# Patient Record
Sex: MALE | Race: WHITE | Employment: OTHER | URBAN - METROPOLITAN AREA
[De-identification: names, ages, dates, MRNs, and addresses within clinical notes are randomized per-mention and may not be internally consistent; named-entity substitution may affect disease eponyms.]

---

## 2021-03-30 ENCOUNTER — OFFICE VISIT (OUTPATIENT)
Dept: INTERNAL MEDICINE CLINIC | Facility: CLINIC | Age: 86
End: 2021-03-30
Payer: COMMERCIAL

## 2021-03-30 ENCOUNTER — LAB ENCOUNTER (OUTPATIENT)
Dept: LAB | Age: 86
End: 2021-03-30
Attending: INTERNAL MEDICINE
Payer: MEDICARE

## 2021-03-30 VITALS
HEIGHT: 66 IN | BODY MASS INDEX: 22.02 KG/M2 | TEMPERATURE: 98 F | WEIGHT: 137 LBS | OXYGEN SATURATION: 99 % | HEART RATE: 88 BPM | RESPIRATION RATE: 16 BRPM | SYSTOLIC BLOOD PRESSURE: 166 MMHG | DIASTOLIC BLOOD PRESSURE: 86 MMHG

## 2021-03-30 DIAGNOSIS — Z00.00 PHYSICAL EXAM: ICD-10-CM

## 2021-03-30 DIAGNOSIS — E78.5 HYPERLIPIDEMIA, UNSPECIFIED HYPERLIPIDEMIA TYPE: ICD-10-CM

## 2021-03-30 DIAGNOSIS — I10 ESSENTIAL HYPERTENSION: ICD-10-CM

## 2021-03-30 DIAGNOSIS — Z00.00 PHYSICAL EXAM: Primary | ICD-10-CM

## 2021-03-30 DIAGNOSIS — M19.90 ARTHRITIS: ICD-10-CM

## 2021-03-30 PROCEDURE — 99203 OFFICE O/P NEW LOW 30 MIN: CPT | Performed by: INTERNAL MEDICINE

## 2021-03-30 PROCEDURE — 3077F SYST BP >= 140 MM HG: CPT | Performed by: INTERNAL MEDICINE

## 2021-03-30 PROCEDURE — 3079F DIAST BP 80-89 MM HG: CPT | Performed by: INTERNAL MEDICINE

## 2021-03-30 PROCEDURE — 3008F BODY MASS INDEX DOCD: CPT | Performed by: INTERNAL MEDICINE

## 2021-03-30 PROCEDURE — 80053 COMPREHEN METABOLIC PANEL: CPT

## 2021-03-30 PROCEDURE — 80061 LIPID PANEL: CPT

## 2021-03-30 PROCEDURE — 85025 COMPLETE CBC W/AUTO DIFF WBC: CPT

## 2021-03-30 PROCEDURE — G0439 PPPS, SUBSEQ VISIT: HCPCS | Performed by: INTERNAL MEDICINE

## 2021-03-30 PROCEDURE — 83036 HEMOGLOBIN GLYCOSYLATED A1C: CPT

## 2021-03-30 PROCEDURE — 36415 COLL VENOUS BLD VENIPUNCTURE: CPT

## 2021-03-30 PROCEDURE — 84443 ASSAY THYROID STIM HORMONE: CPT

## 2021-03-30 RX ORDER — LISINOPRIL 30 MG/1
30 TABLET ORAL DAILY
COMMUNITY
Start: 2021-01-27 | End: 2021-03-30

## 2021-03-30 RX ORDER — EZETIMIBE 10 MG/1
10 TABLET ORAL DAILY
COMMUNITY
Start: 2021-01-12 | End: 2021-04-13

## 2021-03-30 RX ORDER — LISINOPRIL 40 MG/1
40 TABLET ORAL DAILY
Qty: 90 TABLET | Refills: 3 | Status: SHIPPED | OUTPATIENT
Start: 2021-03-30

## 2021-03-30 RX ORDER — ASPIRIN 81 MG/1
TABLET, CHEWABLE ORAL DAILY
COMMUNITY

## 2021-03-30 NOTE — PROGRESS NOTES
Tu Watson is a 80year old male who presents for a Medicare Annual Wellness visit. Presents to office for the first time to establish care.     Did not take BP meds this morning--thought he was not supposed to take this because he was fasting for bl Fall/Risk Scorin            Depression Screening (PHQ-2/PHQ-9): Over the LAST 2 WEEKS   Little interest or pleasure in doing things: Not at all    Feeling down, depressed, or hopeless: Not at all    PHQ-2 SCORE: 0        A found for this or any previous visit. Update Immunization Activity if applicable    Hepatitis B No orders found for this or any previous visit. Update Immunization Activity if applicable    Tetanus No orders found for this or any previous visit.  Update Imm TONSILLECTOMY        Family History   Problem Relation Age of Onset   • Heart Disorder Maternal Grandmother    • Diabetes Mother       SOCIAL HISTORY:   Social History    Tobacco Use      Smoking status: Former Smoker        Years: 40.00        Quit date: NECK: supple, no adenopathy, no bruits, no thyromegaly  CHEST: no chest tenderness  LUNGS: clear to auscultation without any wheezing or rhonchi  CARDIO: RRR without murmur, S1, S2  GI: good BS's, no masses, HSM or tenderness  : two descended testicles exercises. Patient thinks he has had pneumonia vaccines but will check for sure. He was reminded to get COVID-19 vaccine when available to him. The patient indicates understanding of these issues and agrees to the plan.   The patient is asked to retu

## 2021-04-01 ENCOUNTER — TELEPHONE (OUTPATIENT)
Dept: INTERNAL MEDICINE CLINIC | Facility: CLINIC | Age: 86
End: 2021-04-01

## 2021-04-01 NOTE — TELEPHONE ENCOUNTER
Yojana Gale MD  P [facility] Clinical Staff  Hemoglobin A1c is 6.1--this puts patient in prediabetic range. No medication is needed.    However patient needs to try to be more strict with good dietary habits and regular exercise in order to prevent p

## 2021-04-01 NOTE — TELEPHONE ENCOUNTER
Spoke with patient and relayed Dr. Mackenzie Farrell messages. Patient verbalized an understanding to all information.

## 2021-04-01 NOTE — TELEPHONE ENCOUNTER
----- Message from Willi Disal MD sent at 3/31/2021  7:37 PM CDT -----  Hemoglobin A1c is 6.1--this puts patient in prediabetic range. No medication is needed.   However patient needs to try to be more strict with good dietary habits and regular exerc

## 2021-04-13 ENCOUNTER — TELEPHONE (OUTPATIENT)
Dept: INTERNAL MEDICINE CLINIC | Facility: CLINIC | Age: 86
End: 2021-04-13

## 2021-04-14 RX ORDER — EZETIMIBE 10 MG/1
10 TABLET ORAL DAILY
Qty: 90 TABLET | Refills: 3 | Status: SHIPPED | OUTPATIENT
Start: 2021-04-14

## 2021-08-02 ENCOUNTER — OFFICE VISIT (OUTPATIENT)
Dept: INTERNAL MEDICINE CLINIC | Facility: CLINIC | Age: 86
End: 2021-08-02
Payer: COMMERCIAL

## 2021-08-02 VITALS
DIASTOLIC BLOOD PRESSURE: 68 MMHG | HEART RATE: 79 BPM | WEIGHT: 137.19 LBS | HEIGHT: 66 IN | OXYGEN SATURATION: 100 % | SYSTOLIC BLOOD PRESSURE: 144 MMHG | BODY MASS INDEX: 22.05 KG/M2

## 2021-08-02 DIAGNOSIS — I10 ESSENTIAL HYPERTENSION: Primary | ICD-10-CM

## 2021-08-02 DIAGNOSIS — R73.03 PREDIABETES: ICD-10-CM

## 2021-08-02 DIAGNOSIS — E78.5 HYPERLIPIDEMIA, UNSPECIFIED HYPERLIPIDEMIA TYPE: ICD-10-CM

## 2021-08-02 PROCEDURE — 90471 IMMUNIZATION ADMIN: CPT | Performed by: INTERNAL MEDICINE

## 2021-08-02 PROCEDURE — 3077F SYST BP >= 140 MM HG: CPT | Performed by: INTERNAL MEDICINE

## 2021-08-02 PROCEDURE — 3078F DIAST BP <80 MM HG: CPT | Performed by: INTERNAL MEDICINE

## 2021-08-02 PROCEDURE — 90732 PPSV23 VACC 2 YRS+ SUBQ/IM: CPT | Performed by: INTERNAL MEDICINE

## 2021-08-02 PROCEDURE — 99213 OFFICE O/P EST LOW 20 MIN: CPT | Performed by: INTERNAL MEDICINE

## 2021-08-02 PROCEDURE — 3008F BODY MASS INDEX DOCD: CPT | Performed by: INTERNAL MEDICINE

## 2021-08-02 NOTE — PROGRESS NOTES
HPI:    Patient ID: Karen Welch is a 80year old male. Presents for f/u. Has been feeling well and has no complaints. Has been monitoring blood pressure at home with systolic readings in the 031R and diastolic readings in the 65R.     Denies anny Essential hypertension  (primary encounter diagnosis)  Hyperlipidemia, unspecified hyperlipidemia type  Prediabetes    Blood pressure remains under reasonable control with lisinopril--continue the same.     Last lipid profile done 4 months ago showed LDL

## 2021-11-02 ENCOUNTER — IMMUNIZATION (OUTPATIENT)
Dept: INTERNAL MEDICINE CLINIC | Facility: CLINIC | Age: 86
End: 2021-11-02
Payer: COMMERCIAL

## 2021-11-02 DIAGNOSIS — Z23 NEED FOR VACCINATION: Primary | ICD-10-CM

## 2021-11-02 PROCEDURE — 90662 IIV NO PRSV INCREASED AG IM: CPT | Performed by: INTERNAL MEDICINE

## 2021-11-02 PROCEDURE — 90471 IMMUNIZATION ADMIN: CPT | Performed by: INTERNAL MEDICINE

## 2022-01-17 ENCOUNTER — APPOINTMENT (OUTPATIENT)
Dept: CT IMAGING | Facility: HOSPITAL | Age: 87
End: 2022-01-17
Attending: EMERGENCY MEDICINE
Payer: MEDICARE

## 2022-01-17 ENCOUNTER — HOSPITAL ENCOUNTER (EMERGENCY)
Facility: HOSPITAL | Age: 87
Discharge: HOME | End: 2022-01-17
Attending: EMERGENCY MEDICINE
Payer: MEDICARE

## 2022-01-17 VITALS
DIASTOLIC BLOOD PRESSURE: 77 MMHG | WEIGHT: 135 LBS | HEART RATE: 87 BPM | SYSTOLIC BLOOD PRESSURE: 150 MMHG | RESPIRATION RATE: 16 BRPM | OXYGEN SATURATION: 96 % | BODY MASS INDEX: 22 KG/M2 | TEMPERATURE: 98 F

## 2022-01-17 DIAGNOSIS — S01.01XA LACERATION OF SCALP, INITIAL ENCOUNTER: Primary | ICD-10-CM

## 2022-01-17 DIAGNOSIS — S09.90XA INJURY OF HEAD, INITIAL ENCOUNTER: ICD-10-CM

## 2022-01-17 PROCEDURE — 99284 EMERGENCY DEPT VISIT MOD MDM: CPT

## 2022-01-17 PROCEDURE — 12001 RPR S/N/AX/GEN/TRNK 2.5CM/<: CPT

## 2022-01-17 PROCEDURE — 70450 CT HEAD/BRAIN W/O DYE: CPT | Performed by: EMERGENCY MEDICINE

## 2022-01-17 PROCEDURE — 90471 IMMUNIZATION ADMIN: CPT

## 2022-01-17 RX ORDER — LIDOCAINE HYDROCHLORIDE AND EPINEPHRINE 20; 5 MG/ML; UG/ML
20 INJECTION, SOLUTION EPIDURAL; INFILTRATION; INTRACAUDAL; PERINEURAL ONCE
Status: COMPLETED | OUTPATIENT
Start: 2022-01-17 | End: 2022-01-17

## 2022-01-18 NOTE — ED QUICK NOTES
This RN spoke with patient's nephew Ankit Fitzpatrick [phone], updated on plan of care with patient's approval. Okay to call Ankit Fitzpatrick for a ride home.

## 2022-01-18 NOTE — ED PROVIDER NOTES
Patient Seen in: [facility] AND [facility] Emergency Department      History   Patient presents with:  Fall    Stated Complaint: Fall with Head Lac    Subjective:   HPI    80year old male presents for evaluation for a fall.   Patient was at home when he tripped reviewed. Constitutional:       Appearance: Normal appearance. He is well-developed. HENT:      Head: Normocephalic. Laceration present.         Right Ear: External ear normal.      Left Ear: External ear normal.      Nose: Nose normal. No nasal deformi pain, normal range of motion and neck supple. No erythema or rigidity. No spinous process tenderness or muscular tenderness. Normal range of motion.       Thoracic back: Normal.      Lumbar back: Normal.      Right hip: Normal.      Left hip: Normal.      R encouraged. Imaging:   CT BRAIN OR HEAD (98566)    Result Date: 1/17/2022  PROCEDURE: CT BRAIN OR HEAD (CPT=70450)  COMPARISON: None. INDICATIONS: Fall with head posterior laceration.   TECHNIQUE: CT images were obtained without contrast material.  Aut Taniya Joe MD on 1/17/2022 at 9:19 PM            I personally reviewed all radiology images.     Medical Record Review: I personally reviewed available prior medical records for any recent pertinent discharge summaries, testing, and procedures, and

## 2022-01-18 NOTE — ED INITIAL ASSESSMENT (HPI)
patient states he fell and hit head head on a glass table 30 mins PTA. Lac/ hematomta to the back of his head. States he was going to plug in his hand massager and he lost his balance and fell backwards. Denies any dizziness before he fell. Denies LOC.  He

## 2022-01-27 ENCOUNTER — OFFICE VISIT (OUTPATIENT)
Dept: INTERNAL MEDICINE CLINIC | Facility: CLINIC | Age: 87
End: 2022-01-27
Payer: MEDICARE

## 2022-01-27 VITALS
BODY MASS INDEX: 22.05 KG/M2 | TEMPERATURE: 98 F | OXYGEN SATURATION: 99 % | DIASTOLIC BLOOD PRESSURE: 86 MMHG | SYSTOLIC BLOOD PRESSURE: 150 MMHG | WEIGHT: 137.19 LBS | HEIGHT: 66 IN | HEART RATE: 76 BPM

## 2022-01-27 DIAGNOSIS — S01.01XD LACERATION OF SCALP, SUBSEQUENT ENCOUNTER: Primary | ICD-10-CM

## 2022-01-27 PROCEDURE — 99213 OFFICE O/P EST LOW 20 MIN: CPT | Performed by: INTERNAL MEDICINE

## 2022-01-27 PROCEDURE — 3008F BODY MASS INDEX DOCD: CPT | Performed by: INTERNAL MEDICINE

## 2022-01-27 PROCEDURE — 3077F SYST BP >= 140 MM HG: CPT | Performed by: INTERNAL MEDICINE

## 2022-01-27 PROCEDURE — 3079F DIAST BP 80-89 MM HG: CPT | Performed by: INTERNAL MEDICINE

## 2022-01-27 NOTE — PROGRESS NOTES
Luan Senior is a 80year old male. HPI:   Patient presents with:  ER F/U: 1/17/22 for laceration to scalp. Pt present for stitch removal to scalp. Denies pain.        79 y/o M who suffered posterior scalp laceration on 1/17/22 when he fell backwards a negative. PHYSICAL EXAM:   Blood pressure 150/86, pulse 76, temperature 97.5 °F (36.4 °C), height 5' 6\" (1.676 m), weight 137 lb 3.2 oz (62.2 kg), SpO2 99 %.   Constitutional: alert and oriented x3 in no acute distress  HEENT- EOMI, PERRL  Nose/Mout

## 2022-02-08 ENCOUNTER — LAB ENCOUNTER (OUTPATIENT)
Dept: LAB | Age: 87
End: 2022-02-08
Attending: INTERNAL MEDICINE
Payer: MEDICARE

## 2022-02-08 ENCOUNTER — OFFICE VISIT (OUTPATIENT)
Dept: INTERNAL MEDICINE CLINIC | Facility: CLINIC | Age: 87
End: 2022-02-08
Payer: MEDICARE

## 2022-02-08 VITALS
WEIGHT: 136 LBS | HEIGHT: 66 IN | SYSTOLIC BLOOD PRESSURE: 142 MMHG | HEART RATE: 80 BPM | DIASTOLIC BLOOD PRESSURE: 82 MMHG | BODY MASS INDEX: 21.86 KG/M2 | TEMPERATURE: 98 F

## 2022-02-08 DIAGNOSIS — R73.03 PREDIABETES: ICD-10-CM

## 2022-02-08 DIAGNOSIS — N40.1 BENIGN PROSTATIC HYPERPLASIA WITH NOCTURIA: ICD-10-CM

## 2022-02-08 DIAGNOSIS — I10 ESSENTIAL HYPERTENSION: ICD-10-CM

## 2022-02-08 DIAGNOSIS — Z00.00 PHYSICAL EXAM: Primary | ICD-10-CM

## 2022-02-08 DIAGNOSIS — H91.93 BILATERAL HEARING LOSS, UNSPECIFIED HEARING LOSS TYPE: ICD-10-CM

## 2022-02-08 DIAGNOSIS — R35.1 BENIGN PROSTATIC HYPERPLASIA WITH NOCTURIA: ICD-10-CM

## 2022-02-08 DIAGNOSIS — E78.5 HYPERLIPIDEMIA, UNSPECIFIED HYPERLIPIDEMIA TYPE: ICD-10-CM

## 2022-02-08 DIAGNOSIS — Z00.00 PHYSICAL EXAM: ICD-10-CM

## 2022-02-08 LAB
ALBUMIN SERPL-MCNC: 4.2 G/DL (ref 3.4–5)
ALBUMIN/GLOB SERPL: 1.2 {RATIO} (ref 1–2)
ALP LIVER SERPL-CCNC: 76 U/L
ALT SERPL-CCNC: 24 U/L
ANION GAP SERPL CALC-SCNC: 7 MMOL/L (ref 0–18)
AST SERPL-CCNC: 16 U/L (ref 15–37)
BASOPHILS # BLD AUTO: 0.06 X10(3) UL (ref 0–0.2)
BASOPHILS NFR BLD AUTO: 1 %
BILIRUB SERPL-MCNC: 0.5 MG/DL (ref 0.1–2)
BUN BLD-MCNC: 24 MG/DL (ref 7–18)
BUN/CREAT SERPL: 25.5 (ref 10–20)
CALCIUM BLD-MCNC: 9.8 MG/DL (ref 8.5–10.1)
CHLORIDE SERPL-SCNC: 106 MMOL/L (ref 98–112)
CHOLEST SERPL-MCNC: 157 MG/DL (ref ?–200)
CO2 SERPL-SCNC: 26 MMOL/L (ref 21–32)
CREAT BLD-MCNC: 0.94 MG/DL
DEPRECATED RDW RBC AUTO: 46.9 FL (ref 35.1–46.3)
EOSINOPHIL # BLD AUTO: 0.16 X10(3) UL (ref 0–0.7)
EOSINOPHIL NFR BLD AUTO: 2.6 %
ERYTHROCYTE [DISTWIDTH] IN BLOOD BY AUTOMATED COUNT: 13.2 % (ref 11–15)
EST. AVERAGE GLUCOSE BLD GHB EST-MCNC: 123 MG/DL (ref 68–126)
FASTING PATIENT LIPID ANSWER: YES
FASTING STATUS PATIENT QL REPORTED: YES
GLOBULIN PLAS-MCNC: 3.5 G/DL (ref 2.8–4.4)
GLUCOSE BLD-MCNC: 118 MG/DL (ref 70–99)
HBA1C MFR BLD: 5.9 % (ref ?–5.7)
HCT VFR BLD AUTO: 35.3 %
HDLC SERPL-MCNC: 65 MG/DL (ref 40–59)
HGB BLD-MCNC: 11.8 G/DL
IMM GRANULOCYTES # BLD AUTO: 0.01 X10(3) UL (ref 0–1)
IMM GRANULOCYTES NFR BLD: 0.2 %
LDLC SERPL CALC-MCNC: 78 MG/DL (ref ?–100)
LYMPHOCYTES # BLD AUTO: 1.29 X10(3) UL (ref 1–4)
LYMPHOCYTES NFR BLD AUTO: 20.6 %
MCH RBC QN AUTO: 32.3 PG (ref 26–34)
MCHC RBC AUTO-ENTMCNC: 33.4 G/DL (ref 31–37)
MCV RBC AUTO: 96.7 FL
MONOCYTES # BLD AUTO: 0.56 X10(3) UL (ref 0.1–1)
MONOCYTES NFR BLD AUTO: 8.9 %
NEUTROPHILS # BLD AUTO: 4.19 X10 (3) UL (ref 1.5–7.7)
NEUTROPHILS # BLD AUTO: 4.19 X10(3) UL (ref 1.5–7.7)
NEUTROPHILS NFR BLD AUTO: 66.7 %
NONHDLC SERPL-MCNC: 92 MG/DL (ref ?–130)
OSMOLALITY SERPL CALC.SUM OF ELEC: 293 MOSM/KG (ref 275–295)
PLATELET # BLD AUTO: 268 10(3)UL (ref 150–450)
POTASSIUM SERPL-SCNC: 5 MMOL/L (ref 3.5–5.1)
PROT SERPL-MCNC: 7.7 G/DL (ref 6.4–8.2)
RBC # BLD AUTO: 3.65 X10(6)UL
SODIUM SERPL-SCNC: 139 MMOL/L (ref 136–145)
TRIGL SERPL-MCNC: 75 MG/DL (ref 30–149)
TSI SER-ACNC: 2.13 MIU/ML (ref 0.36–3.74)
VLDLC SERPL CALC-MCNC: 12 MG/DL (ref 0–30)
WBC # BLD AUTO: 6.3 X10(3) UL (ref 4–11)

## 2022-02-08 PROCEDURE — 84443 ASSAY THYROID STIM HORMONE: CPT

## 2022-02-08 PROCEDURE — 99397 PER PM REEVAL EST PAT 65+ YR: CPT | Performed by: INTERNAL MEDICINE

## 2022-02-08 PROCEDURE — 3008F BODY MASS INDEX DOCD: CPT | Performed by: INTERNAL MEDICINE

## 2022-02-08 PROCEDURE — 36415 COLL VENOUS BLD VENIPUNCTURE: CPT

## 2022-02-08 PROCEDURE — 96160 PT-FOCUSED HLTH RISK ASSMT: CPT | Performed by: INTERNAL MEDICINE

## 2022-02-08 PROCEDURE — G0439 PPPS, SUBSEQ VISIT: HCPCS | Performed by: INTERNAL MEDICINE

## 2022-02-08 PROCEDURE — 85025 COMPLETE CBC W/AUTO DIFF WBC: CPT

## 2022-02-08 PROCEDURE — 80061 LIPID PANEL: CPT

## 2022-02-08 PROCEDURE — 3077F SYST BP >= 140 MM HG: CPT | Performed by: INTERNAL MEDICINE

## 2022-02-08 PROCEDURE — 83036 HEMOGLOBIN GLYCOSYLATED A1C: CPT

## 2022-02-08 PROCEDURE — 80053 COMPREHEN METABOLIC PANEL: CPT

## 2022-02-08 PROCEDURE — 3079F DIAST BP 80-89 MM HG: CPT | Performed by: INTERNAL MEDICINE

## 2022-02-11 ENCOUNTER — TELEPHONE (OUTPATIENT)
Dept: INTERNAL MEDICINE CLINIC | Facility: CLINIC | Age: 87
End: 2022-02-11

## 2022-02-11 NOTE — TELEPHONE ENCOUNTER
----- Message from Hortencia Sepulveda MD sent at 2/10/2022  5:42 PM CST -----  General chemistries including electrolytes, kidney function and liver function tests are all unremarkable. Cholesterol is 157 with LDL of 78--shows good control. Thyroid function test is normal.    Hemoglobin A1c is improved from last year and is now 5.9, where last year it was 6.1--continue to push healthy weight loss through good dietary habits and regular exercise in order to prevent progression to diabetes in the future. CBC is unremarkable. Continue same medications.

## 2022-02-14 RX ORDER — LISINOPRIL 40 MG/1
TABLET ORAL
Qty: 90 TABLET | Refills: 3 | Status: SHIPPED | OUTPATIENT
Start: 2022-02-14

## 2022-03-25 ENCOUNTER — TELEPHONE (OUTPATIENT)
Dept: INTERNAL MEDICINE CLINIC | Facility: CLINIC | Age: 87
End: 2022-03-25

## 2022-03-25 ENCOUNTER — OFFICE VISIT (OUTPATIENT)
Dept: INTERNAL MEDICINE CLINIC | Facility: CLINIC | Age: 87
End: 2022-03-25
Payer: MEDICARE

## 2022-03-25 VITALS
DIASTOLIC BLOOD PRESSURE: 68 MMHG | SYSTOLIC BLOOD PRESSURE: 130 MMHG | OXYGEN SATURATION: 98 % | WEIGHT: 137 LBS | BODY MASS INDEX: 22.02 KG/M2 | HEIGHT: 66 IN | TEMPERATURE: 98 F | HEART RATE: 97 BPM

## 2022-03-25 DIAGNOSIS — L23.7 POISON IVY DERMATITIS: Primary | ICD-10-CM

## 2022-03-25 PROCEDURE — 3075F SYST BP GE 130 - 139MM HG: CPT | Performed by: INTERNAL MEDICINE

## 2022-03-25 PROCEDURE — 99214 OFFICE O/P EST MOD 30 MIN: CPT | Performed by: INTERNAL MEDICINE

## 2022-03-25 PROCEDURE — 3078F DIAST BP <80 MM HG: CPT | Performed by: INTERNAL MEDICINE

## 2022-03-25 PROCEDURE — 3008F BODY MASS INDEX DOCD: CPT | Performed by: INTERNAL MEDICINE

## 2022-03-25 RX ORDER — PREDNISONE 20 MG/1
TABLET ORAL
Qty: 19 TABLET | Refills: 0 | Status: SHIPPED | OUTPATIENT
Start: 2022-03-25 | End: 2022-04-08

## 2022-03-25 RX ORDER — ASPIRIN 81 MG/1
81 TABLET ORAL DAILY
COMMUNITY

## 2022-03-25 RX ORDER — CETIRIZINE HYDROCHLORIDE 5 MG/1
5 TABLET ORAL DAILY
Qty: 30 TABLET | Refills: 0 | Status: SHIPPED | OUTPATIENT
Start: 2022-03-25

## 2022-03-25 NOTE — TELEPHONE ENCOUNTER
Spoke to pt who reports x2 weeks red, dry and flaky rash bilateral arms, shoulders, chest and back of neck. States rash is extremely itchy, but Benadryl spray did help with symptoms. Pt reports he was gardening right before rash started and was exposed to rick; unknown if poison ivy or not. Denies oral itching/swelling or airway constriction, hives/bumps, covid type symptoms, fever, change in medications, detergent or products. [place].      Appt scheduled with Dr. Eddie Khan at 21 [phone] as PCP schedule full

## 2022-03-25 NOTE — TELEPHONE ENCOUNTER
Patient has an itchy rash on both of his arms   He is scratching a lot   Pt's neighbor is a nurse, she sprayed benadryl on it and the itching has subsided  Requests call back from Dr Brown Walker nurse   to advise what else he can do for this   [phone]

## 2022-04-07 RX ORDER — EZETIMIBE 10 MG/1
TABLET ORAL
Qty: 90 TABLET | Refills: 3 | Status: SHIPPED | OUTPATIENT
Start: 2022-04-07

## 2022-04-18 ENCOUNTER — OFFICE VISIT (OUTPATIENT)
Dept: INTERNAL MEDICINE CLINIC | Facility: CLINIC | Age: 87
End: 2022-04-18
Payer: MEDICARE

## 2022-04-18 VITALS
WEIGHT: 135 LBS | BODY MASS INDEX: 21.69 KG/M2 | HEIGHT: 66 IN | SYSTOLIC BLOOD PRESSURE: 152 MMHG | TEMPERATURE: 97 F | DIASTOLIC BLOOD PRESSURE: 80 MMHG

## 2022-04-18 DIAGNOSIS — E78.5 HYPERLIPIDEMIA, UNSPECIFIED HYPERLIPIDEMIA TYPE: ICD-10-CM

## 2022-04-18 DIAGNOSIS — I10 ESSENTIAL HYPERTENSION: ICD-10-CM

## 2022-04-18 DIAGNOSIS — R21 RASH: Primary | ICD-10-CM

## 2022-04-18 PROCEDURE — 99213 OFFICE O/P EST LOW 20 MIN: CPT | Performed by: INTERNAL MEDICINE

## 2022-04-18 PROCEDURE — 3077F SYST BP >= 140 MM HG: CPT | Performed by: INTERNAL MEDICINE

## 2022-04-18 PROCEDURE — 3079F DIAST BP 80-89 MM HG: CPT | Performed by: INTERNAL MEDICINE

## 2022-04-18 PROCEDURE — 3008F BODY MASS INDEX DOCD: CPT | Performed by: INTERNAL MEDICINE

## 2022-04-18 RX ORDER — AMLODIPINE BESYLATE 10 MG/1
10 TABLET ORAL DAILY
Qty: 30 TABLET | Refills: 0 | Status: SHIPPED | OUTPATIENT
Start: 2022-04-18

## 2022-04-18 RX ORDER — PREDNISONE 20 MG/1
40 TABLET ORAL DAILY
Qty: 20 TABLET | Refills: 0 | Status: SHIPPED | OUTPATIENT
Start: 2022-04-18 | End: 2022-04-28

## 2022-04-25 ENCOUNTER — OFFICE VISIT (OUTPATIENT)
Dept: INTERNAL MEDICINE CLINIC | Facility: CLINIC | Age: 87
End: 2022-04-25
Payer: MEDICARE

## 2022-04-25 VITALS
BODY MASS INDEX: 22.44 KG/M2 | TEMPERATURE: 98 F | WEIGHT: 139.63 LBS | DIASTOLIC BLOOD PRESSURE: 70 MMHG | HEART RATE: 81 BPM | HEIGHT: 66 IN | SYSTOLIC BLOOD PRESSURE: 160 MMHG | OXYGEN SATURATION: 99 %

## 2022-04-25 DIAGNOSIS — I10 ESSENTIAL HYPERTENSION: ICD-10-CM

## 2022-04-25 DIAGNOSIS — Z88.8 ALLERGY TO LISINOPRIL: ICD-10-CM

## 2022-04-25 DIAGNOSIS — R21 RASH: Primary | ICD-10-CM

## 2022-04-25 RX ORDER — AMLODIPINE BESYLATE 10 MG/1
10 TABLET ORAL DAILY
Qty: 90 TABLET | Refills: 3 | Status: SHIPPED | OUTPATIENT
Start: 2022-04-25 | End: 2022-04-25

## 2022-05-25 ENCOUNTER — OFFICE VISIT (OUTPATIENT)
Dept: INTERNAL MEDICINE CLINIC | Facility: CLINIC | Age: 87
End: 2022-05-25
Payer: MEDICARE

## 2022-05-25 VITALS
WEIGHT: 132 LBS | HEIGHT: 66 IN | HEART RATE: 82 BPM | SYSTOLIC BLOOD PRESSURE: 134 MMHG | OXYGEN SATURATION: 98 % | DIASTOLIC BLOOD PRESSURE: 72 MMHG | TEMPERATURE: 98 F | BODY MASS INDEX: 21.21 KG/M2

## 2022-05-25 DIAGNOSIS — I10 ESSENTIAL HYPERTENSION: Primary | ICD-10-CM

## 2022-05-25 DIAGNOSIS — R21 RASH: ICD-10-CM

## 2022-05-25 PROCEDURE — 3008F BODY MASS INDEX DOCD: CPT | Performed by: INTERNAL MEDICINE

## 2022-05-25 PROCEDURE — 3078F DIAST BP <80 MM HG: CPT | Performed by: INTERNAL MEDICINE

## 2022-05-25 PROCEDURE — 3075F SYST BP GE 130 - 139MM HG: CPT | Performed by: INTERNAL MEDICINE

## 2022-05-25 PROCEDURE — 99213 OFFICE O/P EST LOW 20 MIN: CPT | Performed by: INTERNAL MEDICINE

## 2022-07-01 ENCOUNTER — OFFICE VISIT (OUTPATIENT)
Dept: DERMATOLOGY CLINIC | Facility: CLINIC | Age: 87
End: 2022-07-01
Payer: MEDICARE

## 2022-07-01 DIAGNOSIS — L82.1 SEBORRHEIC KERATOSES: ICD-10-CM

## 2022-07-01 DIAGNOSIS — L81.4 LENTIGO: ICD-10-CM

## 2022-07-01 DIAGNOSIS — L30.9 DERMATITIS: Primary | ICD-10-CM

## 2022-07-01 PROCEDURE — 99203 OFFICE O/P NEW LOW 30 MIN: CPT | Performed by: DERMATOLOGY

## 2022-07-01 PROCEDURE — 1126F AMNT PAIN NOTED NONE PRSNT: CPT | Performed by: DERMATOLOGY

## 2022-07-01 RX ORDER — CLOBETASOL PROPIONATE 0.5 MG/G
CREAM TOPICAL
Qty: 45 G | Refills: 0 | Status: SHIPPED | OUTPATIENT
Start: 2022-07-01

## 2023-02-06 ENCOUNTER — OFFICE VISIT (OUTPATIENT)
Dept: INTERNAL MEDICINE CLINIC | Facility: CLINIC | Age: 88
End: 2023-02-06

## 2023-02-06 ENCOUNTER — LAB ENCOUNTER (OUTPATIENT)
Dept: LAB | Age: 88
End: 2023-02-06
Attending: INTERNAL MEDICINE
Payer: MEDICARE

## 2023-02-06 VITALS
TEMPERATURE: 98 F | WEIGHT: 129 LBS | BODY MASS INDEX: 20.73 KG/M2 | HEART RATE: 90 BPM | HEIGHT: 66 IN | OXYGEN SATURATION: 98 % | DIASTOLIC BLOOD PRESSURE: 64 MMHG | SYSTOLIC BLOOD PRESSURE: 128 MMHG

## 2023-02-06 DIAGNOSIS — Z00.00 PHYSICAL EXAM: ICD-10-CM

## 2023-02-06 DIAGNOSIS — E78.5 HYPERLIPIDEMIA, UNSPECIFIED HYPERLIPIDEMIA TYPE: ICD-10-CM

## 2023-02-06 DIAGNOSIS — M79.604 LEG PAIN, ANTERIOR, RIGHT: ICD-10-CM

## 2023-02-06 DIAGNOSIS — R73.03 PREDIABETES: ICD-10-CM

## 2023-02-06 DIAGNOSIS — I10 ESSENTIAL HYPERTENSION: ICD-10-CM

## 2023-02-06 DIAGNOSIS — R35.1 BENIGN PROSTATIC HYPERPLASIA WITH NOCTURIA: ICD-10-CM

## 2023-02-06 DIAGNOSIS — M19.90 ARTHRITIS: ICD-10-CM

## 2023-02-06 DIAGNOSIS — Z00.00 PHYSICAL EXAM: Primary | ICD-10-CM

## 2023-02-06 DIAGNOSIS — H91.93 BILATERAL HEARING LOSS, UNSPECIFIED HEARING LOSS TYPE: ICD-10-CM

## 2023-02-06 DIAGNOSIS — N40.1 BENIGN PROSTATIC HYPERPLASIA WITH NOCTURIA: ICD-10-CM

## 2023-02-06 LAB
ALBUMIN SERPL-MCNC: 3.9 G/DL (ref 3.4–5)
ALBUMIN/GLOB SERPL: 1 {RATIO} (ref 1–2)
ALP LIVER SERPL-CCNC: 103 U/L
ALT SERPL-CCNC: 26 U/L
ANION GAP SERPL CALC-SCNC: 4 MMOL/L (ref 0–18)
AST SERPL-CCNC: 18 U/L (ref 15–37)
BASOPHILS # BLD AUTO: 0.04 X10(3) UL (ref 0–0.2)
BASOPHILS NFR BLD AUTO: 0.6 %
BILIRUB SERPL-MCNC: 0.5 MG/DL (ref 0.1–2)
BUN BLD-MCNC: 31 MG/DL (ref 7–18)
BUN/CREAT SERPL: 30.4 (ref 10–20)
CALCIUM BLD-MCNC: 9.6 MG/DL (ref 8.5–10.1)
CHLORIDE SERPL-SCNC: 108 MMOL/L (ref 98–112)
CHOLEST SERPL-MCNC: 148 MG/DL (ref ?–200)
CO2 SERPL-SCNC: 27 MMOL/L (ref 21–32)
CREAT BLD-MCNC: 1.02 MG/DL
DEPRECATED RDW RBC AUTO: 46.6 FL (ref 35.1–46.3)
EOSINOPHIL # BLD AUTO: 0.2 X10(3) UL (ref 0–0.7)
EOSINOPHIL NFR BLD AUTO: 2.9 %
ERYTHROCYTE [DISTWIDTH] IN BLOOD BY AUTOMATED COUNT: 13 % (ref 11–15)
EST. AVERAGE GLUCOSE BLD GHB EST-MCNC: 140 MG/DL (ref 68–126)
FASTING PATIENT LIPID ANSWER: YES
FASTING STATUS PATIENT QL REPORTED: YES
GFR SERPLBLD BASED ON 1.73 SQ M-ARVRAT: 70 ML/MIN/1.73M2 (ref 60–?)
GLOBULIN PLAS-MCNC: 3.9 G/DL (ref 2.8–4.4)
GLUCOSE BLD-MCNC: 137 MG/DL (ref 70–99)
HBA1C MFR BLD: 6.5 % (ref ?–5.7)
HCT VFR BLD AUTO: 36.1 %
HDLC SERPL-MCNC: 71 MG/DL (ref 40–59)
HGB BLD-MCNC: 11.9 G/DL
IMM GRANULOCYTES # BLD AUTO: 0.01 X10(3) UL (ref 0–1)
IMM GRANULOCYTES NFR BLD: 0.1 %
LDLC SERPL CALC-MCNC: 60 MG/DL (ref ?–100)
LYMPHOCYTES # BLD AUTO: 1.3 X10(3) UL (ref 1–4)
LYMPHOCYTES NFR BLD AUTO: 19.1 %
MCH RBC QN AUTO: 31.9 PG (ref 26–34)
MCHC RBC AUTO-ENTMCNC: 33 G/DL (ref 31–37)
MCV RBC AUTO: 96.8 FL
MONOCYTES # BLD AUTO: 0.53 X10(3) UL (ref 0.1–1)
MONOCYTES NFR BLD AUTO: 7.8 %
NEUTROPHILS # BLD AUTO: 4.73 X10 (3) UL (ref 1.5–7.7)
NEUTROPHILS # BLD AUTO: 4.73 X10(3) UL (ref 1.5–7.7)
NEUTROPHILS NFR BLD AUTO: 69.5 %
NONHDLC SERPL-MCNC: 77 MG/DL (ref ?–130)
OSMOLALITY SERPL CALC.SUM OF ELEC: 297 MOSM/KG (ref 275–295)
PLATELET # BLD AUTO: 279 10(3)UL (ref 150–450)
POTASSIUM SERPL-SCNC: 4.2 MMOL/L (ref 3.5–5.1)
PROT SERPL-MCNC: 7.8 G/DL (ref 6.4–8.2)
RBC # BLD AUTO: 3.73 X10(6)UL
SODIUM SERPL-SCNC: 139 MMOL/L (ref 136–145)
TRIGL SERPL-MCNC: 89 MG/DL (ref 30–149)
TSI SER-ACNC: 1.79 MIU/ML (ref 0.36–3.74)
VLDLC SERPL CALC-MCNC: 13 MG/DL (ref 0–30)
WBC # BLD AUTO: 6.8 X10(3) UL (ref 4–11)

## 2023-02-06 PROCEDURE — 80061 LIPID PANEL: CPT

## 2023-02-06 PROCEDURE — 85025 COMPLETE CBC W/AUTO DIFF WBC: CPT

## 2023-02-06 PROCEDURE — 80053 COMPREHEN METABOLIC PANEL: CPT

## 2023-02-06 PROCEDURE — 83036 HEMOGLOBIN GLYCOSYLATED A1C: CPT

## 2023-02-06 PROCEDURE — 36415 COLL VENOUS BLD VENIPUNCTURE: CPT

## 2023-02-06 PROCEDURE — 84443 ASSAY THYROID STIM HORMONE: CPT

## 2023-02-06 RX ORDER — NAPROXEN SODIUM 220 MG
1 TABLET ORAL 2 TIMES DAILY
COMMUNITY

## 2023-02-07 ENCOUNTER — TELEPHONE (OUTPATIENT)
Dept: INTERNAL MEDICINE CLINIC | Facility: CLINIC | Age: 88
End: 2023-02-07

## 2023-02-07 DIAGNOSIS — E11.9 NEW ONSET TYPE 2 DIABETES MELLITUS (HCC): Primary | ICD-10-CM

## 2023-02-07 NOTE — TELEPHONE ENCOUNTER
Patient called and relayed Dr Lauren Echavarria message. Patient verbalized understanding with no further questions noted. Patient does not want to see dietician as he knows he has been eating more sweets lately.

## 2023-02-07 NOTE — TELEPHONE ENCOUNTER
----- Message from Clarissa Watson MD sent at 2/7/2023  9:32 AM CST -----  Hemoglobin A1c has increased to 6.5--this now classifies patient as diabetic. However we do not need to start medications unless this number gets greater than 7. Patient needs to be more strict with good dietary habits. He does eat out at restaurant nightly--needs to make better choices. Can also have patient evaluated by dietitian to help with dietary measures  Referral has been placed. CBC shows stable hemoglobin and normal white cell count. Cholesterol is good at 148 with LDL of 60    Electrolytes, kidney function and liver function test appear normal.    Thyroid function test is normal.    Continue same other medications.

## 2023-04-05 RX ORDER — EZETIMIBE 10 MG/1
TABLET ORAL
Qty: 90 TABLET | Refills: 3 | Status: SHIPPED | OUTPATIENT
Start: 2023-04-05

## 2023-04-21 RX ORDER — AMLODIPINE BESYLATE 10 MG/1
TABLET ORAL
Qty: 90 TABLET | Refills: 3 | Status: SHIPPED | OUTPATIENT
Start: 2023-04-21

## 2023-12-04 ENCOUNTER — TELEPHONE (OUTPATIENT)
Dept: INTERNAL MEDICINE CLINIC | Facility: CLINIC | Age: 88
End: 2023-12-04

## 2024-02-16 ENCOUNTER — OFFICE VISIT (OUTPATIENT)
Dept: INTERNAL MEDICINE CLINIC | Facility: CLINIC | Age: 89
End: 2024-02-16

## 2024-02-16 VITALS
WEIGHT: 125 LBS | BODY MASS INDEX: 20.09 KG/M2 | TEMPERATURE: 98 F | HEIGHT: 66 IN | DIASTOLIC BLOOD PRESSURE: 66 MMHG | OXYGEN SATURATION: 98 % | SYSTOLIC BLOOD PRESSURE: 118 MMHG | RESPIRATION RATE: 18 BRPM | HEART RATE: 80 BPM

## 2024-02-16 DIAGNOSIS — Z00.00 MEDICARE ANNUAL WELLNESS VISIT, SUBSEQUENT: Primary | ICD-10-CM

## 2024-02-16 DIAGNOSIS — M79.641 HAND PAIN, RIGHT: ICD-10-CM

## 2024-02-16 DIAGNOSIS — N40.1 BENIGN PROSTATIC HYPERPLASIA WITH NOCTURIA: ICD-10-CM

## 2024-02-16 DIAGNOSIS — Z00.00 ENCOUNTER FOR ANNUAL HEALTH EXAMINATION: ICD-10-CM

## 2024-02-16 DIAGNOSIS — E78.5 HYPERLIPIDEMIA, UNSPECIFIED HYPERLIPIDEMIA TYPE: ICD-10-CM

## 2024-02-16 DIAGNOSIS — H61.21 EXCESSIVE EAR WAX, RIGHT: ICD-10-CM

## 2024-02-16 DIAGNOSIS — I10 ESSENTIAL HYPERTENSION: ICD-10-CM

## 2024-02-16 DIAGNOSIS — R35.1 BENIGN PROSTATIC HYPERPLASIA WITH NOCTURIA: ICD-10-CM

## 2024-02-16 DIAGNOSIS — E55.9 VITAMIN D DEFICIENCY: ICD-10-CM

## 2024-02-16 LAB
ALBUMIN SERPL-MCNC: 4.6 G/DL (ref 3.2–4.8)
ALBUMIN/GLOB SERPL: 1.2 {RATIO} (ref 1–2)
ALP LIVER SERPL-CCNC: 86 U/L
ALT SERPL-CCNC: 18 U/L
ANION GAP SERPL CALC-SCNC: 6 MMOL/L (ref 0–18)
AST SERPL-CCNC: 24 U/L (ref ?–34)
BASOPHILS # BLD AUTO: 0.06 X10(3) UL (ref 0–0.2)
BASOPHILS NFR BLD AUTO: 0.9 %
BILIRUB SERPL-MCNC: 0.5 MG/DL (ref 0.2–0.9)
BILIRUB UR QL: NEGATIVE
BUN BLD-MCNC: 19 MG/DL (ref 9–23)
BUN/CREAT SERPL: 19.8 (ref 10–20)
CALCIUM BLD-MCNC: 10.4 MG/DL (ref 8.7–10.4)
CHLORIDE SERPL-SCNC: 104 MMOL/L (ref 98–112)
CHOLEST SERPL-MCNC: 171 MG/DL (ref ?–200)
CO2 SERPL-SCNC: 28 MMOL/L (ref 21–32)
COLOR UR: YELLOW
CREAT BLD-MCNC: 0.96 MG/DL
CREAT UR-SCNC: 193.1 MG/DL
DEPRECATED RDW RBC AUTO: 44 FL (ref 35.1–46.3)
EGFRCR SERPLBLD CKD-EPI 2021: 75 ML/MIN/1.73M2 (ref 60–?)
EOSINOPHIL # BLD AUTO: 0.31 X10(3) UL (ref 0–0.7)
EOSINOPHIL NFR BLD AUTO: 4.6 %
ERYTHROCYTE [DISTWIDTH] IN BLOOD BY AUTOMATED COUNT: 12.7 % (ref 11–15)
FASTING PATIENT LIPID ANSWER: YES
FASTING STATUS PATIENT QL REPORTED: YES
GLOBULIN PLAS-MCNC: 3.9 G/DL (ref 2.8–4.4)
GLUCOSE BLD-MCNC: 132 MG/DL (ref 70–99)
GLUCOSE UR-MCNC: NORMAL MG/DL
HCT VFR BLD AUTO: 37 %
HDLC SERPL-MCNC: 64 MG/DL (ref 40–59)
HGB BLD-MCNC: 13.1 G/DL
HGB UR QL STRIP.AUTO: NEGATIVE
IMM GRANULOCYTES # BLD AUTO: 0.01 X10(3) UL (ref 0–1)
IMM GRANULOCYTES NFR BLD: 0.1 %
KETONES UR-MCNC: NEGATIVE MG/DL
LDLC SERPL CALC-MCNC: 89 MG/DL (ref ?–100)
LEUKOCYTE ESTERASE UR QL STRIP.AUTO: 500
LYMPHOCYTES # BLD AUTO: 1.62 X10(3) UL (ref 1–4)
LYMPHOCYTES NFR BLD AUTO: 24.1 %
MCH RBC QN AUTO: 33.2 PG (ref 26–34)
MCHC RBC AUTO-ENTMCNC: 35.4 G/DL (ref 31–37)
MCV RBC AUTO: 93.9 FL
MICROALBUMIN UR-MCNC: 13.1 MG/DL
MICROALBUMIN/CREAT 24H UR-RTO: 67.8 UG/MG (ref ?–30)
MONOCYTES # BLD AUTO: 0.59 X10(3) UL (ref 0.1–1)
MONOCYTES NFR BLD AUTO: 8.8 %
NEUTROPHILS # BLD AUTO: 4.12 X10 (3) UL (ref 1.5–7.7)
NEUTROPHILS # BLD AUTO: 4.12 X10(3) UL (ref 1.5–7.7)
NEUTROPHILS NFR BLD AUTO: 61.5 %
NONHDLC SERPL-MCNC: 107 MG/DL (ref ?–130)
OSMOLALITY SERPL CALC.SUM OF ELEC: 290 MOSM/KG (ref 275–295)
PH UR: 6 [PH] (ref 5–8)
PLATELET # BLD AUTO: 282 10(3)UL (ref 150–450)
POTASSIUM SERPL-SCNC: 5.1 MMOL/L (ref 3.5–5.1)
PROT SERPL-MCNC: 8.5 G/DL (ref 5.7–8.2)
PROT UR-MCNC: 50 MG/DL
RBC # BLD AUTO: 3.94 X10(6)UL
RBC #/AREA URNS AUTO: >10 /HPF
SODIUM SERPL-SCNC: 138 MMOL/L (ref 136–145)
SP GR UR STRIP: 1.02 (ref 1–1.03)
TRIGL SERPL-MCNC: 101 MG/DL (ref 30–149)
TSI SER-ACNC: 2.22 MIU/ML (ref 0.55–4.78)
UROBILINOGEN UR STRIP-ACNC: 2
VIT D+METAB SERPL-MCNC: 21.8 NG/ML (ref 30–100)
VLDLC SERPL CALC-MCNC: 16 MG/DL (ref 0–30)
WBC # BLD AUTO: 6.7 X10(3) UL (ref 4–11)
WBC #/AREA URNS AUTO: >50 /HPF

## 2024-02-16 PROCEDURE — G0439 PPPS, SUBSEQ VISIT: HCPCS | Performed by: INTERNAL MEDICINE

## 2024-02-16 PROCEDURE — 36415 COLL VENOUS BLD VENIPUNCTURE: CPT | Performed by: INTERNAL MEDICINE

## 2024-02-16 PROCEDURE — 96160 PT-FOCUSED HLTH RISK ASSMT: CPT | Performed by: INTERNAL MEDICINE

## 2024-02-16 NOTE — PROGRESS NOTES
Subjective:   Sarwat Townsend is a 89 year old male who presents for a Medicare Subsequent Annual Wellness visit (Pt already had Initial Annual Wellness) and .     Patient comes in first time to establish care and for Medicare annual physical here with his niece who is helping with his care he stopped driving recently patient overall doing okay except for having pain on the right hand is a chronic problem he takes Aleve he says twice a day he has been doing this for many years denies any GI symptoms patient also taking baby aspirin he is not sure why he says he was told to take this long time ago no history of heart disease or stents no history of stroke.  Patient has diabetes diet controlled seen eye doctor recently  Patient also complains that sometimes at night when he goes to bed he feels some burning in his face and scalp but has not bothered him with sleep he is able to fall asleep when he wakes up is okay    History/Other:   Fall Risk Assessment:   He has been screened for Falls and is low risk.      Cognitive Assessment:   He had a completely normal cognitive assessment - see flowsheet entries     Functional Ability/Status:   Sarwat Townsend has some abnormal functions as listed below:  He has Driving difficulties based on screening of functional status. He has Hearing problems based on screening of functional status.      Depression Screening (PHQ-2/PHQ-9): PHQ-2 SCORE: 0  , done 2/16/2024   If you checked off any problems, how difficult have these problems made it for you to do your work, take care of things at home, or get along with other people?: Not difficult at all    Last [place] Suicide Screening on 2/16/2024 was No Risk.          Advanced Directives:   He does NOT have a Living Will. [Do you have a living will?: No]  He does NOT have a Power of  for Health Care. [Do you have a healthcare power of ?: No]  Discussed Advance Care Planning with patient (and family/surrogate if present).  Standard forms made available to patient in After Visit Summary.      Patient Active Problem List   Diagnosis    Essential hypertension    Hyperlipidemia    Arthritis    Prediabetes     Allergies:  He is allergic to lisinopril.    Current Medications:  Outpatient Medications Marked as Taking for the 24 encounter (Office Visit) with Kamlesh Durant MD   Medication Sig    AMLODIPINE 10 MG Oral Tab TAKE 1 TABLET DAILY    EZETIMIBE 10 MG Oral Tab TAKE 1 TABLET DAILY    naproxen (ALEVE) 220 MG Oral Tab Take 1 tablet (220 mg total) by mouth 2 (two) times daily.    clobetasol 0.05 % External Cream Use bid to red itchy areas on chest arms neck    aspirin 81 MG Oral Tab EC Take 1 tablet (81 mg total) by mouth daily.    Multiple Vitamins-Minerals (EYE-VITES) Oral Tab Take by mouth 2 (two) times a day.      B Complex-C-Folic Acid Oral Tab Take by mouth.       Medical History:  He  has a past medical history of Arthritis, Essential hypertension, Hyperlipidemia, and Prediabetes.  Surgical History:  He  has a past surgical history that includes tonsillectomy; hernia surgery; and other.   Family History:  His family history includes Diabetes in his mother; Heart Disorder in his maternal grandmother.  Social History:  He  reports that he quit smoking about 44 years ago. His smoking use included cigarettes. He has never used smokeless tobacco. He reports current alcohol use. He reports that he does not use drugs.    Tobacco:  He smoked tobacco in the past but quit greater than 12 months ago.  Social History    Tobacco Use      Smoking status: Former        Years: 40        Types: Cigarettes        Quit date:         Years since quittin.1      Smokeless tobacco: Never       CAGE Alcohol Screen:   CAGE screening score of 0 on 2024, showing low risk of alcohol abuse.      Patient Care Team:  Kamlesh Durant MD as PCP - General (Internal Medicine)    Review of Systems   Constitutional: Negative.  Negative for fatigue  and fever.   HENT: Negative.  Negative for congestion.    Eyes: Negative.    Respiratory: Negative.  Negative for cough, shortness of breath and wheezing.    Cardiovascular: Negative.  Negative for chest pain, palpitations and leg swelling.   Gastrointestinal: Negative.    Endocrine: Negative for cold intolerance and heat intolerance.   Genitourinary: Negative.  Negative for dysuria, flank pain and hematuria.   Musculoskeletal: Negative.  Negative for arthralgias, back pain and myalgias.        Rt hand pain    Skin: Negative.    Neurological: Negative.  Negative for dizziness, tremors, syncope, weakness and headaches.   Psychiatric/Behavioral: Negative.  Negative for agitation, behavioral problems and suicidal ideas. The patient is not nervous/anxious.           Objective:   Physical Exam  Constitutional:       Appearance: He is well-developed.   HENT:      Head: Normocephalic and atraumatic.      Right Ear: External ear normal. There is impacted cerumen.      Left Ear: External ear normal.      Ears:      Comments: Rt ear impacted cerumen      Nose: Nose normal.   Eyes:      Conjunctiva/sclera: Conjunctivae normal.      Pupils: Pupils are equal, round, and reactive to light.   Cardiovascular:      Rate and Rhythm: Normal rate and regular rhythm.      Heart sounds: Normal heart sounds.   Pulmonary:      Effort: Pulmonary effort is normal.      Breath sounds: Normal breath sounds.   Abdominal:      General: Bowel sounds are normal.      Palpations: Abdomen is soft.   Genitourinary:     Penis: Normal.       Prostate: Normal.      Rectum: Normal.   Musculoskeletal:         General: Tenderness present. Normal range of motion.      Cervical back: Normal range of motion and neck supple.      Comments: Rt hand pain    Skin:     General: Skin is warm and dry.   Neurological:      Mental Status: He is alert and oriented to person, place, and time.      Deep Tendon Reflexes: Reflexes are normal and symmetric.          BP  118/66 (BP Location: Left arm, Patient Position: Sitting, Cuff Size: adult)   Pulse 80   Temp 97.9 °F (36.6 °C) (Rectal)   Resp 18   Ht 5' 6\" (1.676 m)   Wt 125 lb (56.7 kg)   SpO2 98%   BMI 20.18 kg/m²  Estimated body mass index is 20.18 kg/m² as calculated from the following:    Height as of this encounter: 5' 6\" (1.676 m).    Weight as of this encounter: 125 lb (56.7 kg).    Medicare Hearing Assessment:   Hearing Screening    Screening Method: Questionnaire  I have a problem hearing over the telephone: Sometimes I have trouble following the conversations when two or more people are talking at the same time: Sometimes   I have trouble understanding things on the TV: Sometimes I have to strain to understand conversations: Yes   I have to worry about missing the telephone ring or doorbell: No I have trouble hearing conversations in a noisy background such as a crowded room or restaurant: Yes   I get confused about where sounds come from: No I misunderstand some words in a sentence and need to ask people to repeat themselves: Yes   I especially have trouble understanding the speech of women and children: No I have trouble understanding the speaker in a large room such as at a meeting or place of [employer]: Sometimes   Many people I talk to seem to mumble (or don't speak clearly): Yes People get annoyed because I misunderstand what they say: No   I misunderstand what others are saying and make inappropriate responses: No I avoid social activities because I cannot hear well and fear I will reply improperly: No   Family members and friends have told me they think I may have hearing loss: Yes             Visual Acuity:   Right Eye Visual Acuity: Corrected Right Eye Chart Acuity: 20/40   Left Eye Visual Acuity: Corrected Left Eye Chart Acuity: 20/40   Both Eyes Visual Acuity: Corrected Both Eyes Chart Acuity: 20/40   Able To Tolerate Visual Acuity: Yes        Assessment & Plan:   Sarwat Townsend is a 89 year old male  who presents for a Medicare Assessment.     1. Medicare annual wellness visit, subsequent (Primary) exam as above we will order labs  -     CBC With Differential With Platelet; Future; Expected date: 02/16/2024  -     Comp Metabolic Panel (14); Future; Expected date: 02/16/2024  -     Lipid Panel; Future; Expected date: 02/16/2024  -     TSH W Reflex To Free T4; Future; Expected date: 02/16/2024  -     Urinalysis, Routine; Future; Expected date: 02/16/2024  -     Hemoglobin A1C; Future; Expected date: 02/16/2024  -     Microalb/Creat Ratio, Random Urine; Future; Expected date: 02/16/2024  -     CBC With Differential With Platelet  -     Comp Metabolic Panel (14)  -     Lipid Panel  -     TSH W Reflex To Free T4  -     Urinalysis, Routine  -     Hemoglobin A1C  -     Microalb/Creat Ratio, Random Urine  2. Hyperlipidemia, unspecified hyperlipidemia type will retest watch diet  -     CBC With Differential With Platelet; Future; Expected date: 02/16/2024  -     Comp Metabolic Panel (14); Future; Expected date: 02/16/2024  -     Lipid Panel; Future; Expected date: 02/16/2024  -     TSH W Reflex To Free T4; Future; Expected date: 02/16/2024  -     Urinalysis, Routine; Future; Expected date: 02/16/2024  -     Hemoglobin A1C; Future; Expected date: 02/16/2024  -     Microalb/Creat Ratio, Random Urine; Future; Expected date: 02/16/2024  -     CBC With Differential With Platelet  -     Comp Metabolic Panel (14)  -     Lipid Panel  -     TSH W Reflex To Free T4  -     Urinalysis, Routine  -     Hemoglobin A1C  -     Microalb/Creat Ratio, Random Urine  3. Essential hypertension well-controlled continue current treatment  -     CBC With Differential With Platelet; Future; Expected date: 02/16/2024  -     Comp Metabolic Panel (14); Future; Expected date: 02/16/2024  -     Lipid Panel; Future; Expected date: 02/16/2024  -     TSH W Reflex To Free T4; Future; Expected date: 02/16/2024  -     Urinalysis, Routine; Future; Expected  date: 02/16/2024  -     Hemoglobin A1C; Future; Expected date: 02/16/2024  -     Microalb/Creat Ratio, Random Urine; Future; Expected date: 02/16/2024  -     CBC With Differential With Platelet  -     Comp Metabolic Panel (14)  -     Lipid Panel  -     TSH W Reflex To Free T4  -     Urinalysis, Routine  -     Hemoglobin A1C  -     Microalb/Creat Ratio, Random Urine  4. Benign prostatic hyperplasia with nocturia stable do not drink water too late at night  -     CBC With Differential With Platelet; Future; Expected date: 02/16/2024  -     Comp Metabolic Panel (14); Future; Expected date: 02/16/2024  -     Lipid Panel; Future; Expected date: 02/16/2024  -     TSH W Reflex To Free T4; Future; Expected date: 02/16/2024  -     Urinalysis, Routine; Future; Expected date: 02/16/2024  -     Hemoglobin A1C; Future; Expected date: 02/16/2024  -     Microalb/Creat Ratio, Random Urine; Future; Expected date: 02/16/2024  -     CBC With Differential With Platelet  -     Comp Metabolic Panel (14)  -     Lipid Panel  -     TSH W Reflex To Free T4  -     Urinalysis, Routine  -     Hemoglobin A1C  -     Microalb/Creat Ratio, Random Urine  5. Hand pain, right-as needed medication for pain asked him to cut down on the Aleve to no more than few times a week take Tylenol as needed  -     CBC With Differential With Platelet; Future; Expected date: 02/16/2024  -     Comp Metabolic Panel (14); Future; Expected date: 02/16/2024  -     Lipid Panel; Future; Expected date: 02/16/2024  -     TSH W Reflex To Free T4; Future; Expected date: 02/16/2024  -     Urinalysis, Routine; Future; Expected date: 02/16/2024  -     Hemoglobin A1C; Future; Expected date: 02/16/2024  -     Microalb/Creat Ratio, Random Urine; Future; Expected date: 02/16/2024  -     CBC With Differential With Platelet  -     Comp Metabolic Panel (14)  -     Lipid Panel  -     TSH W Reflex To Free T4  -     Urinalysis, Routine  -     Hemoglobin A1C  -     Microalb/Creat Ratio,  Random Urine  6. Vitamin D deficiency we will retest  -     Vitamin D; Future; Expected date: 02/16/2024  -     CBC With Differential With Platelet; Future; Expected date: 02/16/2024  -     Comp Metabolic Panel (14); Future; Expected date: 02/16/2024  -     Lipid Panel; Future; Expected date: 02/16/2024  -     TSH W Reflex To Free T4; Future; Expected date: 02/16/2024  -     Urinalysis, Routine; Future; Expected date: 02/16/2024  -     Hemoglobin A1C; Future; Expected date: 02/16/2024  -     Microalb/Creat Ratio, Random Urine; Future; Expected date: 02/16/2024  -     Vitamin D  -     CBC With Differential With Platelet  -     Comp Metabolic Panel (14)  -     Lipid Panel  -     TSH W Reflex To Free T4  -     Urinalysis, Routine  -     Hemoglobin A1C  -     Microalb/Creat Ratio, Random Urine  7. Excessive ear wax, right use over-the-counter drops if not better can come by to get it flushed    The patient indicates understanding of these issues and agrees to the plan.  Reinforced healthy diet, lifestyle, and exercise.      2.      No follow-ups on file.     Kamlesh Durant MD, 2/16/2024     Supplementary Documentation:   General Health:  In the past six months, have you lost more than 10 pounds without trying?: 2 - No  Has your appetite been poor?: No  Type of Diet: Balanced  How does the patient maintain a good energy level?: Stretching  How would you describe your daily physical activity?: Light  How would you describe your current health state?: Fair  How do you maintain positive mental well-being?: Visiting Family;Social Interaction  On a scale of 0 to 10, with 0 being no pain and 10 being severe pain, what is your pain level?: 3 - (Mild)  In the past six months, have you experienced urine leakage?: 0-No  At any time do you feel concerned for the safety/well-being of yourself and/or your children, in your home or elsewhere?: No  Have you had any immunizations at another office such as Influenza, Hepatitis B, Tetanus,  or Pneumococcal?: No        Sarwat Townsend's SCREENING SCHEDULE   Tests on this list are recommended by your physician but may not be covered, or covered at this frequency, by your insurer.   Please check with your insurance carrier before scheduling to verify coverage.   PREVENTATIVE SERVICES FREQUENCY &  COVERAGE DETAILS LAST COMPLETION DATE   Diabetes Screening    Fasting Blood Sugar / Glucose    One screening every 12 months if never tested or if previously tested but not diagnosed with pre-diabetes   One screening every 6 months if diagnosed with pre-diabetes Lab Results   Component Value Date     (H) 02/06/2023        Cardiovascular Disease Screening    Lipid Panel  Cholesterol  Lipoprotein (HDL)  Triglycerides Covered every 5 years for all Medicare beneficiaries without apparent signs or symptoms of cardiovascular disease Lab Results   Component Value Date    CHOLEST 148 02/06/2023    HDL 71 (H) 02/06/2023    LDL 60 02/06/2023    TRIG 89 02/06/2023         Electrocardiogram (EKG)   Covered if needed at Welcome to Medicare, and non-screening if indicated for medical reasons -      Ultrasound Screening for Abdominal Aortic Aneurysm (AAA) Covered once in a lifetime for one of the following risk factors    Men who are 65-75 years old and have ever smoked    Anyone with a family history -     Colorectal Cancer Screening  Covered for ages 50-85; only need ONE of the following:    Colonoscopy   Covered every 10 years    Covered every 2 years if patient is at high risk or previous colonoscopy was abnormal -    No recommendations at this time    Flexible Sigmoidoscopy   Covered every 4 years -    Fecal Occult Blood Test Covered annually -   Prostate Cancer Screening    Prostate-Specific Antigen (PSA) Annually No results found for: \"PSA\"  There are no preventive care reminders to display for this patient.   Immunizations    Influenza Covered once per flu season  Please get every year -  Influenza Vaccine(1) due  on 10/01/2023    Pneumococcal Each vaccine ([id number] & [id number]) covered once after 65 Prevnar 13: -    [id number]: 08/02/2021     No recommendations at this time    Hepatitis B One screening covered for patients with certain risk factors   -  No recommendations at this time    Tetanus Toxoid Not covered by Medicare Part B unless medically necessary (cut with metal); may be covered with your pharmacy prescription benefits -    Tetanus, Diptheria and Pertusis TD and TDaP Not covered by Medicare Part B -  No recommendations at this time    Zoster Not covered by Medicare Part B; may be covered with your pharmacy  prescription benefits -  Zoster Vaccines(1 of 2) Never done     Diabetes      Hemoglobin A1C Annually; if last result is elevated, may be asked to retest more frequently.    Medicare covers every 3 months Lab Results   Component Value Date     (H) 02/06/2023    A1C 6.5 (H) 02/06/2023       Hemoglobin A1C Test due on 08/06/2023    Creat/alb ratio Annually No results found for: \"MICROALBCREA\", \"MALBCRECALC\"    LDL Annually Lab Results   Component Value Date    LDL 60 02/06/2023       Dilated Eye Exam Annually Last Diabetic Eye Exam:  No data recorded  No data recorded

## 2024-02-17 LAB
EST. AVERAGE GLUCOSE BLD GHB EST-MCNC: 146 MG/DL (ref 68–126)
HBA1C MFR BLD: 6.7 % (ref ?–5.7)

## 2024-02-19 DIAGNOSIS — E55.9 VITAMIN D DEFICIENCY: Primary | ICD-10-CM

## 2024-02-29 ENCOUNTER — LAB ENCOUNTER (OUTPATIENT)
Dept: LAB | Age: 89
End: 2024-02-29
Attending: INTERNAL MEDICINE
Payer: MEDICARE

## 2024-02-29 PROCEDURE — 81001 URINALYSIS AUTO W/SCOPE: CPT | Performed by: INTERNAL MEDICINE

## 2024-03-07 ENCOUNTER — OFFICE VISIT (OUTPATIENT)
Dept: INTERNAL MEDICINE CLINIC | Facility: CLINIC | Age: 89
End: 2024-03-07

## 2024-03-07 VITALS
WEIGHT: 126 LBS | HEIGHT: 66 IN | RESPIRATION RATE: 17 BRPM | HEART RATE: 88 BPM | BODY MASS INDEX: 20.25 KG/M2 | OXYGEN SATURATION: 99 % | DIASTOLIC BLOOD PRESSURE: 62 MMHG | TEMPERATURE: 98 F | SYSTOLIC BLOOD PRESSURE: 136 MMHG

## 2024-03-07 DIAGNOSIS — H61.21 IMPACTED CERUMEN OF RIGHT EAR: Primary | ICD-10-CM

## 2024-03-07 PROCEDURE — 99213 OFFICE O/P EST LOW 20 MIN: CPT | Performed by: INTERNAL MEDICINE

## 2024-03-07 NOTE — PROGRESS NOTES
Subjective:   Patient ID: Sarwat Townsend is a 89 year old male.    HPI    Patient comes in today to have the wax removed from the right ear he tried over-the-counter drops but they did not help right ear feels muffled  History/Other:   Review of Systems   Constitutional: Negative.  Negative for fatigue and fever.   HENT:  Positive for hearing loss. Negative for congestion.         Rt ear   Eyes: Negative.    Respiratory: Negative.  Negative for cough, shortness of breath and wheezing.    Cardiovascular: Negative.  Negative for chest pain, palpitations and leg swelling.   Gastrointestinal: Negative.    Endocrine: Negative for cold intolerance and heat intolerance.   Genitourinary: Negative.  Negative for dysuria, flank pain and hematuria.   Musculoskeletal: Negative.  Negative for arthralgias, back pain and myalgias.   Skin: Negative.    Neurological: Negative.  Negative for dizziness, tremors, syncope, weakness and headaches.   Psychiatric/Behavioral: Negative.  Negative for agitation, behavioral problems and suicidal ideas. The patient is not nervous/anxious.      Current Outpatient Medications   Medication Sig Dispense Refill    nitrofurantoin monohydrate macro 100 MG Oral Cap Take 1 capsule (100 mg total) by mouth 2 (two) times daily. 10 capsule 0    AMLODIPINE 10 MG Oral Tab TAKE 1 TABLET DAILY 90 tablet 3    EZETIMIBE 10 MG Oral Tab TAKE 1 TABLET DAILY 90 tablet 3    naproxen (ALEVE) 220 MG Oral Tab Take 1 tablet (220 mg total) by mouth 2 (two) times daily.      clobetasol 0.05 % External Cream Use bid to red itchy areas on chest arms neck 45 g 0    aspirin 81 MG Oral Tab EC Take 1 tablet (81 mg total) by mouth daily.      Multiple Vitamins-Minerals (EYE-VITES) Oral Tab Take by mouth 2 (two) times a day.        B Complex-C-Folic Acid Oral Tab Take by mouth.       Allergies:  Allergies   Allergen Reactions    Lisinopril HIVES       Objective:   Physical Exam  Vitals and nursing note reviewed.   Constitutional:        Appearance: He is well-developed.   HENT:      Head: Normocephalic and atraumatic.      Comments: Cerumen was removed using 1/2 warm H20 and  1/2 h202     Right Ear: External ear normal. There is impacted cerumen.      Left Ear: External ear normal.      Nose: Nose normal.   Eyes:      Conjunctiva/sclera: Conjunctivae normal.      Pupils: Pupils are equal, round, and reactive to light.   Cardiovascular:      Rate and Rhythm: Normal rate and regular rhythm.      Heart sounds: Normal heart sounds.   Pulmonary:      Effort: Pulmonary effort is normal.      Breath sounds: Normal breath sounds.   Abdominal:      General: Bowel sounds are normal.      Palpations: Abdomen is soft.   Genitourinary:     Penis: Normal.       Prostate: Normal.      Rectum: Normal.   Musculoskeletal:         General: Normal range of motion.      Cervical back: Normal range of motion and neck supple.   Skin:     General: Skin is warm and dry.   Neurological:      Mental Status: He is alert and oriented to person, place, and time.      Deep Tendon Reflexes: Reflexes are normal and symmetric.         Assessment & Plan:   1. Impacted cerumen of right ear Cerumen was removed using 1/2 warm H20 and  1/2 h202       No orders of the defined types were placed in this encounter.      Meds This Visit:  Requested Prescriptions      No prescriptions requested or ordered in this encounter       Imaging & Referrals:  None

## 2024-04-19 RX ORDER — AMLODIPINE BESYLATE 10 MG/1
10 TABLET ORAL DAILY
Qty: 90 TABLET | Refills: 3 | Status: SHIPPED | OUTPATIENT
Start: 2024-04-19

## 2024-04-19 NOTE — TELEPHONE ENCOUNTER
REFILL PASSED PER [facility]    Requested Prescriptions   Pending Prescriptions Disp Refills    amLODIPine 10 MG Oral Tab 90 tablet 3     Sig: Take 1 tablet (10 mg total) by mouth daily.       Hypertension Medications Protocol Passed - 4/19/2024 10:19 AM        Passed - CMP or BMP in past 12 months        Passed - Last BP reading less than 140/90     BP Readings from Last 1 Encounters:   03/07/24 136/62               Passed - In person appointment or virtual visit in the past 12 mos or appointment in next 3 mos     Recent Outpatient Visits              [facility]    2 months ago [facility] annual wellness [facility]    [facility]    1 year ago Physical exam    [facility], [address], Kuldip Navarro MD    Office Visit    1 year ago Dermatitis    [facility], Megha Vee MD    Office Visit    1 year ago Essential hypertension    [facility], [address], Kuldip Navarro MD    Office Visit          Future Appointments         Provider Department Appt Notes    In 4 months Kamlesh Durant MD [facility] 6 month fu                    Passed - EGFRCR or GFRNAA > 50     GFR Evaluation  EGFRCR: 75 , resulted on 2/16/2024               Future Appointments         Provider Department Appt Notes    In 4 months Kamlesh Durant MD [facility] 6 month fu          Recent Outpatient Visits              1 month ago [facility]ed cerumen [facility] right [facility]    [facility]    2 months ago Medicare annual wellness visit, subsequent    [facility]  [address], [place] Kamlesh Durant MD    Office Visit    1 year ago Physical exam    [facility], [facility], Kuldip Navarro MD    Office Visit    1 year ago Dermatitis    [facility], [facility], Megha Vee MD    Office Visit    1 year ago Essential hypertension    [facility], [facility], Kuldip Navarro MD    Office Visit

## 2024-04-19 NOTE — TELEPHONE ENCOUNTER
Patient requesting rx:    Amlodipine 10mg      [facility] - [place], [place] - [address] [phone], [phone] [address] [address]   Phone: [phone] Fax: [phone]   Hours: Not open 24 hours       Patient said his pharmacy told him they are not able to get a response from us.

## 2024-05-06 NOTE — TELEPHONE ENCOUNTER
Patient calling to request refill, verified pharmacy     Current Outpatient Medications   Medication Sig Dispense Refill    EZETIMIBE 10 MG Oral Tab TAKE 1 TABLET DAILY 90 tablet 3

## 2024-05-06 NOTE — TELEPHONE ENCOUNTER
Ezetimibe 10 MG Oral Tablet (Zetia) is due for refill   Doesn't say which pharmacy was [facility]

## 2024-05-07 RX ORDER — EZETIMIBE 10 MG/1
10 TABLET ORAL DAILY
Qty: 90 TABLET | Refills: 3 | Status: SHIPPED | OUTPATIENT
Start: 2024-05-07

## 2024-05-07 NOTE — TELEPHONE ENCOUNTER
Please review:     Last written by Dr. Jaramillo (retired)    Requested Prescriptions   Pending Prescriptions Disp Refills    ezetimibe 10 MG Oral Tab 90 tablet 3     Sig: Take 1 tablet (10 mg total) by mouth daily.       Cholesterol Medication Protocol Passed - 5/6/2024 10:47 AM        Passed - ALT < 80     Lab Results   Component Value Date    ALT 18 02/16/2024             Passed - ALT resulted within past year        Passed - Lipid panel within past 12 months     Lab Results   Component Value Date    CHOLEST 171 02/16/2024    TRIG 101 02/16/2024    HDL 64 (H) 02/16/2024    LDL 89 02/16/2024    VLDL 16 02/16/2024    NONHDLC 107 02/16/2024             Passed - In person appointment or virtual visit in the past 12 mos or appointment in next 3 mos     Recent Outpatient Visits              2 months ago Impacted cerumen of right ear    [facility] Kamlesh Durant MD    Office Visit    2 months ago Medicare annual wellness visit, subsequent    [facility] [place]    Office Visit    1 year ago Physical exam    [facility], [address], Kuldip Navarro MD    Office Visit    1 year ago Dermatitis    [facility], [place] Megha Hernandez MD    Office Visit    1 year ago Essential hypertension    [facility], [address], Kuldip Navarro MD    Office Visit          Future Appointments         Provider Department Appt Notes    In 3 months [facility]                         Future Appointments         Provider Department Appt Notes    In 3 months [facility]          Recent Outpatient Visits              2 months ago Impacted cerumen of right ear    [facility] [place] Bhargav  Kamlesh MEEHAN MD    Office Visit    2 months ago Medicare annual wellness visit, subsequent    [facility], [facility] [place] Kamlesh Durant MD    Office Visit    1 year ago Physical exam    [facility], [facility], Kuldip Navarro MD    Office Visit    1 year ago Dermatitis    [facility], [facility], Megha Vee MD    Office Visit    1 year ago Essential hypertension    [facility] Kuldip [facility]    Office Visit

## 2024-08-20 ENCOUNTER — OFFICE VISIT (OUTPATIENT)
Dept: INTERNAL MEDICINE CLINIC | Facility: CLINIC | Age: 89
End: 2024-08-20

## 2024-08-20 VITALS
SYSTOLIC BLOOD PRESSURE: 130 MMHG | HEIGHT: 66 IN | DIASTOLIC BLOOD PRESSURE: 72 MMHG | WEIGHT: 116 LBS | TEMPERATURE: 98 F | HEART RATE: 86 BPM | RESPIRATION RATE: 17 BRPM | BODY MASS INDEX: 18.64 KG/M2 | OXYGEN SATURATION: 97 %

## 2024-08-20 DIAGNOSIS — E11.9 COMPREHENSIVE DIABETIC FOOT EXAMINATION, TYPE 2 DM, ENCOUNTER FOR (HCC): ICD-10-CM

## 2024-08-20 DIAGNOSIS — R63.4 WEIGHT LOSS: ICD-10-CM

## 2024-08-20 DIAGNOSIS — E11.9 TYPE 2 DIABETES MELLITUS WITHOUT COMPLICATION, WITHOUT LONG-TERM CURRENT USE OF INSULIN (HCC): ICD-10-CM

## 2024-08-20 DIAGNOSIS — R63.0 POOR APPETITE: ICD-10-CM

## 2024-08-20 DIAGNOSIS — I10 ESSENTIAL HYPERTENSION: Primary | ICD-10-CM

## 2024-08-20 DIAGNOSIS — E78.5 HYPERLIPIDEMIA, UNSPECIFIED HYPERLIPIDEMIA TYPE: ICD-10-CM

## 2024-08-20 LAB
ALBUMIN SERPL-MCNC: 4.7 G/DL (ref 3.2–4.8)
ALBUMIN/GLOB SERPL: 1.4 {RATIO} (ref 1–2)
ALP LIVER SERPL-CCNC: 84 U/L
ALT SERPL-CCNC: 18 U/L
ANION GAP SERPL CALC-SCNC: 7 MMOL/L (ref 0–18)
AST SERPL-CCNC: 22 U/L (ref ?–34)
BASOPHILS # BLD AUTO: 0.07 X10(3) UL (ref 0–0.2)
BASOPHILS NFR BLD AUTO: 1 %
BILIRUB SERPL-MCNC: 0.7 MG/DL (ref 0.2–0.9)
BUN BLD-MCNC: 16 MG/DL (ref 9–23)
BUN/CREAT SERPL: 19.8 (ref 10–20)
CALCIUM BLD-MCNC: 10.3 MG/DL (ref 8.7–10.4)
CHLORIDE SERPL-SCNC: 102 MMOL/L (ref 98–112)
CO2 SERPL-SCNC: 27 MMOL/L (ref 21–32)
CREAT BLD-MCNC: 0.81 MG/DL
DEPRECATED RDW RBC AUTO: 43.6 FL (ref 35.1–46.3)
EGFRCR SERPLBLD CKD-EPI 2021: 83 ML/MIN/1.73M2 (ref 60–?)
EOSINOPHIL # BLD AUTO: 0.29 X10(3) UL (ref 0–0.7)
EOSINOPHIL NFR BLD AUTO: 4 %
ERYTHROCYTE [DISTWIDTH] IN BLOOD BY AUTOMATED COUNT: 12.5 % (ref 11–15)
FASTING STATUS PATIENT QL REPORTED: YES
GLOBULIN PLAS-MCNC: 3.4 G/DL (ref 2–3.5)
GLUCOSE BLD-MCNC: 142 MG/DL (ref 70–99)
HCT VFR BLD AUTO: 36.2 %
HGB BLD-MCNC: 12.5 G/DL
IMM GRANULOCYTES # BLD AUTO: 0.01 X10(3) UL (ref 0–1)
IMM GRANULOCYTES NFR BLD: 0.1 %
LYMPHOCYTES # BLD AUTO: 1.55 X10(3) UL (ref 1–4)
LYMPHOCYTES NFR BLD AUTO: 21.2 %
MCH RBC QN AUTO: 32.6 PG (ref 26–34)
MCHC RBC AUTO-ENTMCNC: 34.5 G/DL (ref 31–37)
MCV RBC AUTO: 94.5 FL
MONOCYTES # BLD AUTO: 0.61 X10(3) UL (ref 0.1–1)
MONOCYTES NFR BLD AUTO: 8.3 %
NEUTROPHILS # BLD AUTO: 4.78 X10 (3) UL (ref 1.5–7.7)
NEUTROPHILS # BLD AUTO: 4.78 X10(3) UL (ref 1.5–7.7)
NEUTROPHILS NFR BLD AUTO: 65.4 %
OSMOLALITY SERPL CALC.SUM OF ELEC: 286 MOSM/KG (ref 275–295)
PLATELET # BLD AUTO: 281 10(3)UL (ref 150–450)
POTASSIUM SERPL-SCNC: 4.2 MMOL/L (ref 3.5–5.1)
PROT SERPL-MCNC: 8.1 G/DL (ref 5.7–8.2)
RBC # BLD AUTO: 3.83 X10(6)UL
SODIUM SERPL-SCNC: 136 MMOL/L (ref 136–145)
WBC # BLD AUTO: 7.3 X10(3) UL (ref 4–11)

## 2024-08-20 PROCEDURE — 99214 OFFICE O/P EST MOD 30 MIN: CPT | Performed by: INTERNAL MEDICINE

## 2024-08-20 PROCEDURE — 36415 COLL VENOUS BLD VENIPUNCTURE: CPT | Performed by: INTERNAL MEDICINE

## 2024-08-21 LAB
EST. AVERAGE GLUCOSE BLD GHB EST-MCNC: 174 MG/DL (ref 68–126)
HBA1C MFR BLD: 7.7 % (ref ?–5.7)

## 2024-08-26 NOTE — PROGRESS NOTES
Subjective:     Patient ID: Sarwat Townsend is a 89 year old male.    HPI  Pt come in for follow up,,complaints of losing weight but does admit that he has had poor appetite and has not been eating to well only eats 2 meals a day.  He just does not have a big appetite. P[t here with nice who helps with his care, she brings food to him all the time  History/Other:   Review of Systems   Constitutional:  Positive for unexpected weight change.   HENT: Negative.     Eyes: Negative.    Respiratory: Negative.     Cardiovascular: Negative.    Gastrointestinal: Negative.    Genitourinary: Negative.    Musculoskeletal: Negative.    Skin: Negative.    Neurological: Negative.    Psychiatric/Behavioral: Negative.       Current Outpatient Medications   Medication Sig Dispense Refill   • metFORMIN 500 MG Oral Tab Take 1 tablet (500 mg total) by mouth 2 (two) times daily with meals. 60 tablet 2   • ezetimibe 10 MG Oral Tab Take 1 tablet (10 mg total) by mouth daily. 90 tablet 3   • amLODIPine 10 MG Oral Tab Take 1 tablet (10 mg total) by mouth daily. 90 tablet 3   • naproxen (ALEVE) 220 MG Oral Tab Take 1 tablet (220 mg total) by mouth 2 (two) times daily.     • clobetasol 0.05 % External Cream Use bid to red itchy areas on chest arms neck 45 g 0   • aspirin 81 MG Oral Tab EC Take 1 tablet (81 mg total) by mouth daily.     • Multiple Vitamins-Minerals (EYE-VITES) Oral Tab Take by mouth 2 (two) times a day.       • B Complex-C-Folic Acid Oral Tab Take by mouth.     • nitrofurantoin monohydrate macro 100 MG Oral Cap Take 1 capsule (100 mg total) by mouth 2 (two) times daily. (Patient not taking: Reported on 8/20/2024) 10 capsule 0     Allergies:  Allergies   Allergen Reactions   • Lisinopril HIVES       Past Medical History:   • Arthritis   • Essential hypertension   • Hyperlipidemia   • Prediabetes      Past Surgical History:   Procedure Laterality Date   • Hernia surgery     • Other      bilateral cataract   • Tonsillectomy         Family History   Problem Relation Age of Onset   • Heart Disorder Maternal Grandmother    • Diabetes Mother       Social History:   Social History     Socioeconomic History   • Marital status:    Tobacco Use   • Smoking status: Former     Current packs/day: 0.00     Types: Cigarettes     Start date:      Quit date:      Years since quittin.6   • Smokeless tobacco: Never   Vaping Use   • Vaping status: Never Used   Substance and Sexual Activity   • Alcohol use: Yes     Comment: 1/month   • Drug use: Never   Other Topics Concern   • Reaction to local anesthetic No        Objective:   Physical Exam  Vitals and nursing note reviewed.   Constitutional:       Appearance: He is well-developed.   HENT:      Head: Normocephalic and atraumatic.      Right Ear: External ear normal.      Left Ear: External ear normal.      Nose: Nose normal.   Eyes:      Conjunctiva/sclera: Conjunctivae normal.      Pupils: Pupils are equal, round, and reactive to light.   Cardiovascular:      Rate and Rhythm: Normal rate and regular rhythm.      Heart sounds: Normal heart sounds.   Pulmonary:      Effort: Pulmonary effort is normal.      Breath sounds: Normal breath sounds.   Abdominal:      General: Bowel sounds are normal.      Palpations: Abdomen is soft.   Genitourinary:     Penis: Normal.       Prostate: Normal.      Rectum: Normal.   Musculoskeletal:         General: Normal range of motion.      Cervical back: Normal range of motion and neck supple.      Comments: Bilateral barefoot skin diabetic exam is normal, visualized feet and the appearance is normal.  Bilateral monofilament/sensation of both feet is normal.  Pulsation pedal pulse exam of both lower legs/feet is normal as well.         Skin:     General: Skin is warm and dry.   Neurological:      Mental Status: He is alert and oriented to person, place, and time.      Deep Tendon Reflexes: Reflexes are normal and symmetric.       Assessment & Plan:   1.  Essential hypertension - bp  ok continue with current care    2. Type 2 diabetes mellitus without complication, without long-term current use of insulin (HCC) - will retest    3. Poor appetite - need to increase calorie intake, add a glucerna 1-2 cans a day if not eating enough   4. Weight loss as above, will monitor    5. Hyperlipidemia, unspecified hyperlipidemia type - continue with current care   6. Comprehensive diabetic foot examination, type 2 DM, encounter for ([facility]) Bilateral barefoot skin diabetic exam is normal, visualized feet and the appearance is normal.  Bilateral monofilament/sensation of both feet is normal.  Pulsation pedal pulse exam of both lower legs/feet is normal as well.           Orders Placed This Encounter   Procedures   • Hemoglobin A1C   • Comp Metabolic Panel (14)   • CBC With Differential With Platelet       Meds This Visit:  Requested Prescriptions      No prescriptions requested or ordered in this encounter       Imaging & Referrals:  None

## 2024-11-18 NOTE — TELEPHONE ENCOUNTER
Please review.  Protocol failed / Has no protocol.    Marked High Priority, patient states out of medication     Requested Prescriptions   Pending Prescriptions Disp Refills    metFORMIN 500 MG Oral Tab [Pharmacy Med Name: Metformin Hydrochloride 500 Mg Tab Darrell] 180 tablet 1     Sig: Take 1 tablet (500 mg total) by mouth  two times daily with meals.       Diabetes Medication Protocol Failed - 11/18/2024  3:54 PM        Failed - Last A1C < 7.5 and within past 6 months     Lab Results   Component Value Date    A1C 7.7 (H) 08/20/2024             Passed - In person appointment or virtual visit in the past 6 mos or appointment in next 3 mos     Recent Outpatient Visits              3 months ago [facility]    [facility]    8 months ago Impacted cerumen of right ear    [facility], Kamlesh Luis MD    Office Visit    9 months ago Medicare annual wellness visit, subsequent    [facility] [place]    Office Visit    1 year ago Physical exam    [facility], [address], Kuldip Navarro MD    Office Visit    2 years ago Dermatitis    [facility], Megha Vee MD    Office Visit          Future Appointments         Provider Department Appt Notes    In 3 months Kamlesh Durant MD [facility]                     Passed - Microalbumin procedure in past 12 months or taking ACE/ARB        Passed - EGFRCR or GFRNAA > 50     GFR Evaluation  EGFRCR: 83 , resulted on 8/20/2024          Passed - GFR in the past 12 months           Future Appointments         Provider Department Appt Notes    In 3 months Kamlesh Durant MD [facility]           Recent Outpatient Visits              3 months ago Essential  hypertension    [facility], [facility], Kamlesh Luis MD    Office Visit    8 months ago Impacted cerumen of right ear    [facility], Kamlesh Luis MD    Office Visit    9 months ago Medicare annual wellness visit, subsequent    [facility], Kamlesh Luis MD    Office Visit    1 year ago Physical exam    [facility], Kuldip Navarro MD    Office Visit    2 years ago Dermatitis    [facility], [facility], Megha Vee MD    Office Visit

## 2025-01-01 ENCOUNTER — TELEPHONE (OUTPATIENT)
Dept: INTERNAL MEDICINE CLINIC | Facility: CLINIC | Age: OVER 89
End: 2025-01-01

## 2025-02-21 ENCOUNTER — OFFICE VISIT (OUTPATIENT)
Dept: INTERNAL MEDICINE CLINIC | Facility: CLINIC | Age: OVER 89
End: 2025-02-21

## 2025-02-21 VITALS
DIASTOLIC BLOOD PRESSURE: 56 MMHG | RESPIRATION RATE: 17 BRPM | HEIGHT: 66 IN | TEMPERATURE: 98 F | BODY MASS INDEX: 18 KG/M2 | HEART RATE: 89 BPM | SYSTOLIC BLOOD PRESSURE: 116 MMHG | OXYGEN SATURATION: 99 % | WEIGHT: 112 LBS

## 2025-02-21 DIAGNOSIS — Z00.00 MEDICARE ANNUAL WELLNESS VISIT, SUBSEQUENT: Primary | ICD-10-CM

## 2025-02-21 DIAGNOSIS — M54.2 NECK DISCOMFORT: ICD-10-CM

## 2025-02-21 DIAGNOSIS — I10 ESSENTIAL HYPERTENSION: ICD-10-CM

## 2025-02-21 DIAGNOSIS — N40.1 BENIGN PROSTATIC HYPERPLASIA WITH NOCTURIA: ICD-10-CM

## 2025-02-21 DIAGNOSIS — R51.9 SCALP PAIN: ICD-10-CM

## 2025-02-21 DIAGNOSIS — R35.1 BENIGN PROSTATIC HYPERPLASIA WITH NOCTURIA: ICD-10-CM

## 2025-02-21 DIAGNOSIS — E11.9 TYPE 2 DIABETES MELLITUS WITHOUT COMPLICATION, WITHOUT LONG-TERM CURRENT USE OF INSULIN (HCC): ICD-10-CM

## 2025-02-21 DIAGNOSIS — E78.5 HYPERLIPIDEMIA, UNSPECIFIED HYPERLIPIDEMIA TYPE: ICD-10-CM

## 2025-02-21 LAB
ALBUMIN SERPL-MCNC: 4.5 G/DL (ref 3.2–4.8)
ALBUMIN/GLOB SERPL: 1.4 {RATIO} (ref 1–2)
ALP LIVER SERPL-CCNC: 92 U/L
ALT SERPL-CCNC: 19 U/L
ANION GAP SERPL CALC-SCNC: 8 MMOL/L (ref 0–18)
AST SERPL-CCNC: 20 U/L (ref ?–34)
BASOPHILS # BLD AUTO: 0.05 X10(3) UL (ref 0–0.2)
BASOPHILS NFR BLD AUTO: 0.7 %
BILIRUB SERPL-MCNC: 0.5 MG/DL (ref 0.2–0.9)
BILIRUB UR QL: NEGATIVE
BUN BLD-MCNC: 20 MG/DL (ref 9–23)
BUN/CREAT SERPL: 23.8 (ref 10–20)
CALCIUM BLD-MCNC: 9.7 MG/DL (ref 8.7–10.4)
CHLORIDE SERPL-SCNC: 100 MMOL/L (ref 98–112)
CHOLEST SERPL-MCNC: 160 MG/DL (ref ?–200)
CO2 SERPL-SCNC: 26 MMOL/L (ref 21–32)
COLOR UR: YELLOW
CREAT BLD-MCNC: 0.84 MG/DL
CREAT UR-SCNC: 111.3 MG/DL
DEPRECATED RDW RBC AUTO: 47.5 FL (ref 35.1–46.3)
EGFRCR SERPLBLD CKD-EPI 2021: 82 ML/MIN/1.73M2 (ref 60–?)
EOSINOPHIL # BLD AUTO: 0.08 X10(3) UL (ref 0–0.7)
EOSINOPHIL NFR BLD AUTO: 1.1 %
ERYTHROCYTE [DISTWIDTH] IN BLOOD BY AUTOMATED COUNT: 13.9 % (ref 11–15)
EST. AVERAGE GLUCOSE BLD GHB EST-MCNC: 203 MG/DL (ref 68–126)
FASTING PATIENT LIPID ANSWER: YES
FASTING STATUS PATIENT QL REPORTED: YES
GLOBULIN PLAS-MCNC: 3.3 G/DL (ref 2–3.5)
GLUCOSE BLD-MCNC: 191 MG/DL (ref 70–99)
GLUCOSE UR-MCNC: NORMAL MG/DL
HBA1C MFR BLD: 8.7 % (ref ?–5.7)
HCT VFR BLD AUTO: 33.4 %
HDLC SERPL-MCNC: 82 MG/DL (ref 40–59)
HGB BLD-MCNC: 11.2 G/DL
HGB UR QL STRIP.AUTO: NEGATIVE
IMM GRANULOCYTES # BLD AUTO: 0.01 X10(3) UL (ref 0–1)
IMM GRANULOCYTES NFR BLD: 0.1 %
KETONES UR-MCNC: NEGATIVE MG/DL
LDLC SERPL CALC-MCNC: 65 MG/DL (ref ?–100)
LEUKOCYTE ESTERASE UR QL STRIP.AUTO: 500
LYMPHOCYTES # BLD AUTO: 1.33 X10(3) UL (ref 1–4)
LYMPHOCYTES NFR BLD AUTO: 18.8 %
MCH RBC QN AUTO: 31.9 PG (ref 26–34)
MCHC RBC AUTO-ENTMCNC: 33.5 G/DL (ref 31–37)
MCV RBC AUTO: 95.2 FL
MICROALBUMIN UR-MCNC: 10.7 MG/DL
MICROALBUMIN/CREAT 24H UR-RTO: 96.1 UG/MG (ref ?–30)
MONOCYTES # BLD AUTO: 0.62 X10(3) UL (ref 0.1–1)
MONOCYTES NFR BLD AUTO: 8.7 %
NEUTROPHILS # BLD AUTO: 5 X10 (3) UL (ref 1.5–7.7)
NEUTROPHILS # BLD AUTO: 5 X10(3) UL (ref 1.5–7.7)
NEUTROPHILS NFR BLD AUTO: 70.6 %
NITRITE UR QL STRIP.AUTO: NEGATIVE
NONHDLC SERPL-MCNC: 78 MG/DL (ref ?–130)
OSMOLALITY SERPL CALC.SUM OF ELEC: 286 MOSM/KG (ref 275–295)
PH UR: 5.5 [PH] (ref 5–8)
PLATELET # BLD AUTO: 391 10(3)UL (ref 150–450)
POTASSIUM SERPL-SCNC: 5.3 MMOL/L (ref 3.5–5.1)
PROT SERPL-MCNC: 7.8 G/DL (ref 5.7–8.2)
PROT UR-MCNC: 30 MG/DL
RBC # BLD AUTO: 3.51 X10(6)UL
SODIUM SERPL-SCNC: 134 MMOL/L (ref 136–145)
SP GR UR STRIP: 1.03 (ref 1–1.03)
TRIGL SERPL-MCNC: 65 MG/DL (ref 30–149)
TSI SER-ACNC: 1.72 UIU/ML (ref 0.55–4.78)
UROBILINOGEN UR STRIP-ACNC: NORMAL
VLDLC SERPL CALC-MCNC: 10 MG/DL (ref 0–30)
WBC # BLD AUTO: 7.1 X10(3) UL (ref 4–11)
WBC #/AREA URNS AUTO: >50 /HPF

## 2025-02-21 PROCEDURE — 84443 ASSAY THYROID STIM HORMONE: CPT | Performed by: INTERNAL MEDICINE

## 2025-02-21 PROCEDURE — 82043 UR ALBUMIN QUANTITATIVE: CPT | Performed by: INTERNAL MEDICINE

## 2025-02-21 PROCEDURE — 82570 ASSAY OF URINE CREATININE: CPT | Performed by: INTERNAL MEDICINE

## 2025-02-21 PROCEDURE — 80053 COMPREHEN METABOLIC PANEL: CPT | Performed by: INTERNAL MEDICINE

## 2025-02-21 PROCEDURE — 80061 LIPID PANEL: CPT | Performed by: INTERNAL MEDICINE

## 2025-02-21 PROCEDURE — 81001 URINALYSIS AUTO W/SCOPE: CPT | Performed by: INTERNAL MEDICINE

## 2025-02-21 PROCEDURE — 85025 COMPLETE CBC W/AUTO DIFF WBC: CPT | Performed by: INTERNAL MEDICINE

## 2025-02-21 PROCEDURE — 83036 HEMOGLOBIN GLYCOSYLATED A1C: CPT | Performed by: INTERNAL MEDICINE

## 2025-02-21 NOTE — PROGRESS NOTES
Subjective:   Sarwat Townsend is a 89 year old male who presents for a MA AHA (Medicare Advantage Annual Health Assessment) and Subsequent Annual Wellness visit (Pt already had Initial Annual Wellness) and .       Patient comes in for Medicare annual here with niece overall doing okay the only complaint he has is that he has some burning in his scalp he says usually is worse at night when he is laying down denies any neck pain diabetes has been well-controlled last numbers were good   History/Other:   Fall Risk Assessment:   He has been screened for Falls and is High Risk. Fall Prevention information provided to patient in After Visit Summary.    Do you feel unsteady when standing or walking?: Yes  Do you worry about falling?: No  Have you fallen in the past year?: No     Cognitive Assessment:   He had a completely normal cognitive assessment - see flowsheet entries     Functional Ability/Status:   Sarwat Townsend has some abnormal functions as listed below:  He has Hearing problems based on screening of functional status.He has Walking problems based on screening of functional status.       Depression Screening (PHQ):  PHQ-2 SCORE: 0  , done 2/21/2025   If you checked off any problems, how difficult have these problems made it for you to do your work, take care of things at home, or get along with other people?: Not difficult at all    Last [place] Suicide Screening on 2/21/2025 was No Risk.          Advanced Directives:   He does NOT have a Living Will. [Do you have a living will?: No]  He does NOT have a Power of  for Health Care. [Do you have a healthcare power of ?: No]  Discussed Advance Care Planning with patient (and family/surrogate if present). Standard forms made available to patient in After Visit Summary.      Patient Active Problem List   Diagnosis    Essential hypertension    Hyperlipidemia    Arthritis    Prediabetes     Allergies:  He is allergic to lisinopril.    Current  Medications:  Outpatient Medications Marked as Taking for the 25 encounter (Office Visit) with Kamlesh Durant MD   Medication Sig    Cholecalciferol (VITAMIN D3) 25 MCG (1000 UT) Oral Cap Take 1 tablet by mouth daily.    metFORMIN 500 MG Oral Tab Take 1 tablet (500 mg total) by mouth 2 (two) times daily with meals.    ezetimibe 10 MG Oral Tab Take 1 tablet (10 mg total) by mouth daily.    amLODIPine 10 MG Oral Tab Take 1 tablet (10 mg total) by mouth daily.    clobetasol 0.05 % External Cream Use bid to red itchy areas on chest arms neck    Multiple Vitamins-Minerals (EYE-VITES) Oral Tab Take by mouth 2 (two) times a day.      B Complex-C-Folic Acid Oral Tab Take by mouth.       Medical History:  He  has a past medical history of Arthritis, Essential hypertension, Hyperlipidemia, and Prediabetes.  Surgical History:  He  has a past surgical history that includes tonsillectomy; hernia surgery; and other.   Family History:  His family history includes Diabetes in his mother; Heart Disorder in his maternal grandmother.  Social History:  He  reports that he quit smoking about 45 years ago. His smoking use included cigarettes. He started smoking about 85 years ago. He has never been exposed to tobacco smoke. He has never used smokeless tobacco. He reports current alcohol use. He reports that he does not use drugs.    Tobacco:  He smoked tobacco in the past but quit greater than 12 months ago.  Social History     Tobacco Use   Smoking Status Former    Current packs/day: 0.00    Types: Cigarettes    Start date:     Quit date: 1980    Years since quittin.1    Passive exposure: Never   Smokeless Tobacco Never        CAGE Alcohol Screen:   CAGE screening score of 0 on 2025, showing low risk of alcohol abuse.      Patient Care Team:  Kamlesh Durant MD as PCP - General (Internal Medicine)    Review of Systems   Constitutional: Negative.  Negative for fatigue and fever.   HENT: Negative.  Negative for  congestion.    Eyes: Negative.    Respiratory: Negative.  Negative for cough, shortness of breath and wheezing.    Cardiovascular: Negative.  Negative for chest pain, palpitations and leg swelling.   Gastrointestinal: Negative.    Endocrine: Negative for cold intolerance and heat intolerance.   Genitourinary: Negative.  Negative for dysuria, flank pain and hematuria.   Musculoskeletal: Negative.  Negative for arthralgias, back pain and myalgias.   Skin: Negative.    Neurological: Negative.  Negative for dizziness, tremors, syncope, weakness and headaches.        Scalp pain/burning sensation    Psychiatric/Behavioral: Negative.  Negative for agitation, behavioral problems and suicidal ideas. The patient is not nervous/anxious.           Objective:   Physical Exam  Vitals and nursing note reviewed.   Constitutional:       Appearance: He is well-developed.   HENT:      Head: Normocephalic and atraumatic.      Right Ear: External ear normal.      Left Ear: External ear normal.      Nose: Nose normal.   Eyes:      Conjunctiva/sclera: Conjunctivae normal.      Pupils: Pupils are equal, round, and reactive to light.   Cardiovascular:      Rate and Rhythm: Normal rate and regular rhythm.      Heart sounds: Normal heart sounds.   Pulmonary:      Effort: Pulmonary effort is normal.      Breath sounds: Normal breath sounds.   Abdominal:      General: Bowel sounds are normal.      Palpations: Abdomen is soft.   Genitourinary:     Penis: Normal.       Prostate: Normal.      Rectum: Normal.   Musculoskeletal:         General: Normal range of motion.      Cervical back: Normal range of motion and neck supple.   Skin:     General: Skin is warm and dry.   Neurological:      Mental Status: He is alert and oriented to person, place, and time.      Deep Tendon Reflexes: Reflexes are normal and symmetric.          /56 (BP Location: Left arm, Patient Position: Sitting, Cuff Size: adult)   Pulse 89   Temp 97.8 °F (36.6 °C) (Oral)    Resp 17   Ht 5' 6\" (1.676 m)   Wt 112 lb (50.8 kg)   SpO2 99%   BMI 18.08 kg/m²  Estimated body mass index is 18.08 kg/m² as calculated from the following:    Height as of this encounter: 5' 6\" (1.676 m).    Weight as of this encounter: 112 lb (50.8 kg).    Medicare Hearing Assessment:   Hearing Screening    Screening Method: Questionnaire  I have a problem hearing over the telephone: Yes I have trouble following the conversations when two or more people are talking at the same time: Yes   I have trouble understanding things on the TV: No I have to strain to understand conversations: Yes   I have to worry about missing the telephone ring or doorbell: No I have trouble hearing conversations in a noisy background such as a crowded room or restaurant: Yes    I misunderstand some words in a sentence and need to ask people to repeat themselves: Yes   I especially have trouble understanding the speech of women and children: Sometimes I have trouble understanding the speaker in a large room such as at a meeting or place of Mandaeism: Yes   Many people I talk to seem to mumble (or don't speak clearly): Yes People get annoyed because I misunderstand what they say: Yes   I misunderstand what others are saying and make inappropriate responses: No I avoid social activities because I cannot hear well and fear I will reply improperly: No   Family members and friends have told me they think I may have hearing loss: Yes             Visual Acuity:   Right Eye Visual Acuity: Corrected Right Eye Chart Acuity: 20/50   Left Eye Visual Acuity: Corrected Left Eye Chart Acuity: 20/70   Both Eyes Visual Acuity: Corrected Both Eyes Chart Acuity: 20/40   Able To Tolerate Visual Acuity: Yes        Assessment & Plan:   Sarwat Townsend is a 89 year old male who presents for a Medicare Assessment.     1. Medicare annual wellness visit, subsequent (Primary)--exams okay will order labs    -     CBC With Differential With Platelet; Future;  Expected date: 02/21/2025  -     Comp Metabolic Panel (14); Future; Expected date: 02/21/2025  -     Lipid Panel; Future; Expected date: 02/21/2025  -     TSH W Reflex To Free T4; Future; Expected date: 02/21/2025  -     Urinalysis, Routine; Future; Expected date: 02/21/2025  -     Hemoglobin A1C; Future; Expected date: 02/21/2025  -     Microalb/Creat Ratio, Random Urine; Future; Expected date: 02/21/2025  -     CBC With Differential With Platelet  -     Comp Metabolic Panel (14)  -     Lipid Panel  -     TSH W Reflex To Free T4  -     Urinalysis, Routine  -     Hemoglobin A1C  -     Microalb/Creat Ratio, Random Urine  2. Scalp pain likely related to cervical spine disease possible pinched nerve will order an x-ray will refer to physiatry this above  -     XR CERVICAL SPINE (4VIEWS) (CPT=72050); Future; Expected date: 02/21/2025  -     Physiatry Referral - In Network  -     CBC With Differential With Platelet; Future; Expected date: 02/21/2025  -     Comp Metabolic Panel (14); Future; Expected date: 02/21/2025  -     Lipid Panel; Future; Expected date: 02/21/2025  -     TSH W Reflex To Free T4; Future; Expected date: 02/21/2025  -     Urinalysis, Routine; Future; Expected date: 02/21/2025  -     Hemoglobin A1C; Future; Expected date: 02/21/2025  -     Microalb/Creat Ratio, Random Urine; Future; Expected date: 02/21/2025  -     CBC With Differential With Platelet  -     Comp Metabolic Panel (14)  -     Lipid Panel  -     TSH W Reflex To Free T4  -     Urinalysis, Routine  -     Hemoglobin A1C  -     Microalb/Creat Ratio, Random Urine  3. Neck discomfort as above  -     XR CERVICAL SPINE (4VIEWS) (CPT=72050); Future; Expected date: 02/21/2025  -     Physiatry Referral - In Network  -     CBC With Differential With Platelet; Future; Expected date: 02/21/2025  -     Comp Metabolic Panel (14); Future; Expected date: 02/21/2025  -     Lipid Panel; Future; Expected date: 02/21/2025  -     TSH W Reflex To Free T4; Future;  Expected date: 02/21/2025  -     Urinalysis, Routine; Future; Expected date: 02/21/2025  -     Hemoglobin A1C; Future; Expected date: 02/21/2025  -     Microalb/Creat Ratio, Random Urine; Future; Expected date: 02/21/2025  -     CBC With Differential With Platelet  -     Comp Metabolic Panel (14)  -     Lipid Panel  -     TSH W Reflex To Free T4  -     Urinalysis, Routine  -     Hemoglobin A1C  -     Microalb/Creat Ratio, Random Urine  4. Essential hypertension well-controlled continue current treatment  -     CBC With Differential With Platelet; Future; Expected date: 02/21/2025  -     Comp Metabolic Panel (14); Future; Expected date: 02/21/2025  -     Lipid Panel; Future; Expected date: 02/21/2025  -     TSH W Reflex To Free T4; Future; Expected date: 02/21/2025  -     Urinalysis, Routine; Future; Expected date: 02/21/2025  -     Hemoglobin A1C; Future; Expected date: 02/21/2025  -     Microalb/Creat Ratio, Random Urine; Future; Expected date: 02/21/2025  -     CBC With Differential With Platelet  -     Comp Metabolic Panel (14)  -     Lipid Panel  -     TSH W Reflex To Free T4  -     Urinalysis, Routine  -     Hemoglobin A1C  -     Microalb/Creat Ratio, Random Urine  5. Benign prostatic hyperplasia with nocturia-stable  -     CBC With Differential With Platelet; Future; Expected date: 02/21/2025  -     Comp Metabolic Panel (14); Future; Expected date: 02/21/2025  -     Lipid Panel; Future; Expected date: 02/21/2025  -     TSH W Reflex To Free T4; Future; Expected date: 02/21/2025  -     Urinalysis, Routine; Future; Expected date: 02/21/2025  -     Hemoglobin A1C; Future; Expected date: 02/21/2025  -     Microalb/Creat Ratio, Random Urine; Future; Expected date: 02/21/2025  -     CBC With Differential With Platelet  -     Comp Metabolic Panel (14)  -     Lipid Panel  -     TSH W Reflex To Free T4  -     Urinalysis, Routine  -     Hemoglobin A1C  -     Microalb/Creat Ratio, Random Urine  6. Hyperlipidemia,  unspecified hyperlipidemia type will retest watch diet  -     CBC With Differential With Platelet; Future; Expected date: 02/21/2025  -     Comp Metabolic Panel (14); Future; Expected date: 02/21/2025  -     Lipid Panel; Future; Expected date: 02/21/2025  -     TSH W Reflex To Free T4; Future; Expected date: 02/21/2025  -     Urinalysis, Routine; Future; Expected date: 02/21/2025  -     Hemoglobin A1C; Future; Expected date: 02/21/2025  -     Microalb/Creat Ratio, Random Urine; Future; Expected date: 02/21/2025  -     CBC With Differential With Platelet  -     Comp Metabolic Panel (14)  -     Lipid Panel  -     TSH W Reflex To Free T4  -     Urinalysis, Routine  -     Hemoglobin A1C  -     Microalb/Creat Ratio, Random Urine  7. Type 2 diabetes mellitus without complication, without long-term current use of insulin (HCC) continue current treatment we will retest    The patient indicates understanding of these issues and agrees to the plan.  Reinforced healthy diet, lifestyle, and exercise.    #2      No follow-ups on file.     Kamlesh Durant MD, 2/21/2025     Supplementary Documentation:   General Health:  In the past six months, have you lost more than 10 pounds without trying?: 1 - Yes  Has your appetite been poor?: No  Type of Diet: Balanced  How would you describe your daily physical activity?: Light  How would you describe your current health state?: Fair  How do you maintain positive mental well-being?: Visiting Family;Visiting Friends  On a scale of 0 to 10, with 0 being no pain and 10 being severe pain, what is your pain level?: 0 - (None)  In the past six months, have you experienced urine leakage?: 0-No  At any time do you feel concerned for the safety/well-being of yourself and/or your children, in your home or elsewhere?: No  Have you had any immunizations at another office such as Influenza, Hepatitis B, Tetanus, or Pneumococcal?: No    Health Maintenance   Topic Date Due    Zoster Vaccines (1 of 2) Never  done    COVID-19 Vaccine (4 - 2024-25 season) 09/01/2024    Influenza Vaccine (1) 10/01/2024    Annual Well Visit  01/01/2025    Diabetes Care: Foot Exam (Annual)  01/01/2025    Diabetes Care: Microalb/Creat Ratio (Annual)  01/01/2025    Diabetes Care Dilated Eye Exam  02/15/2025    Diabetes Care A1C  02/20/2025    Diabetes Care: GFR  08/20/2025    Annual Depression Screening  Completed    Fall Risk Screening (Annual)  Completed    Pneumococcal Vaccine: 50+ Years  Completed    Meningococcal B Vaccine  Aged Out

## 2025-02-22 DIAGNOSIS — E87.5 HYPERKALEMIA: Primary | ICD-10-CM

## 2025-02-22 DIAGNOSIS — R82.90 ABNORMAL URINE: ICD-10-CM

## 2025-03-01 NOTE — TELEPHONE ENCOUNTER
Spoke with patient, Date of Birth verified  Patient was  informed of lab result & MD recommendation, he stated he has   no UTI symptom.  He stated he already took potassium pill past Wednesday and will repeat labs a week from tomorrow (Sunday) as his niece is currently in [place], she drives him to the lab.   Rx for metformin 850 mg twice a day sent to je Durant MD  2/22/2025  6:21 PM CST        A1c is gone up to 8.7 will increase metformin to 850 twice daily watch sugary drinks and other food closer will like to retest in 3 months  That she will also slightly on the higher side we will send the 1 dose of Kayexalate 15 g to take 1 time would like to repeat potassium a week after he takes the medication also if he is taking something high in potassium repeatedly like certain food like banana or other things needs to cut down  Urine possible infection is patient having urinary symptoms like frequency burning if yes then we will need to treat if not drink more fluids I would like to repeat the urine when he gets the labs done will send for culture hemoglobin stable  Any questions let me know

## 2025-03-12 ENCOUNTER — LAB ENCOUNTER (OUTPATIENT)
Dept: LAB | Age: OVER 89
End: 2025-03-12
Attending: INTERNAL MEDICINE
Payer: MEDICARE

## 2025-03-12 DIAGNOSIS — E87.5 HYPERKALEMIA: ICD-10-CM

## 2025-03-12 LAB
BILIRUB UR QL: NEGATIVE
CLARITY UR: CLEAR
COLOR UR: YELLOW
GLUCOSE UR-MCNC: NORMAL MG/DL
HGB UR QL STRIP.AUTO: NEGATIVE
KETONES UR-MCNC: NEGATIVE MG/DL
LEUKOCYTE ESTERASE UR QL STRIP.AUTO: 75
NITRITE UR QL STRIP.AUTO: NEGATIVE
PH UR: 6.5 [PH] (ref 5–8)
POTASSIUM SERPL-SCNC: 4.5 MMOL/L (ref 3.5–5.1)
PROT UR-MCNC: 20 MG/DL
SP GR UR STRIP: 1.01 (ref 1–1.03)
UROBILINOGEN UR STRIP-ACNC: NORMAL

## 2025-03-12 PROCEDURE — 87077 CULTURE AEROBIC IDENTIFY: CPT | Performed by: INTERNAL MEDICINE

## 2025-03-12 PROCEDURE — 84132 ASSAY OF SERUM POTASSIUM: CPT

## 2025-03-12 PROCEDURE — 87186 SC STD MICRODIL/AGAR DIL: CPT | Performed by: INTERNAL MEDICINE

## 2025-03-12 PROCEDURE — 87086 URINE CULTURE/COLONY COUNT: CPT | Performed by: INTERNAL MEDICINE

## 2025-03-12 PROCEDURE — 36415 COLL VENOUS BLD VENIPUNCTURE: CPT

## 2025-03-12 PROCEDURE — 81001 URINALYSIS AUTO W/SCOPE: CPT | Performed by: INTERNAL MEDICINE

## 2025-03-13 DIAGNOSIS — N30.00 ACUTE CYSTITIS WITHOUT HEMATURIA: Primary | ICD-10-CM

## 2025-03-13 DIAGNOSIS — E87.5 HYPERKALEMIA: Primary | ICD-10-CM

## 2025-03-13 RX ORDER — NITROFURANTOIN 25; 75 MG/1; MG/1
100 CAPSULE ORAL 2 TIMES DAILY
Qty: 10 CAPSULE | Refills: 0 | Status: SHIPPED | OUTPATIENT
Start: 2025-03-13 | End: 2025-03-18

## 2025-03-19 ENCOUNTER — HOSPITAL ENCOUNTER (OUTPATIENT)
Dept: GENERAL RADIOLOGY | Facility: HOSPITAL | Age: OVER 89
Discharge: HOME | End: 2025-03-19
Attending: INTERNAL MEDICINE
Payer: MEDICARE

## 2025-03-19 DIAGNOSIS — R51.9 SCALP PAIN: ICD-10-CM

## 2025-03-19 DIAGNOSIS — M54.2 NECK DISCOMFORT: ICD-10-CM

## 2025-03-19 PROCEDURE — 72050 X-RAY EXAM NECK SPINE 4/5VWS: CPT | Performed by: INTERNAL MEDICINE

## 2025-03-24 ENCOUNTER — OFFICE VISIT (OUTPATIENT)
Dept: PHYSICAL MEDICINE AND REHAB | Facility: CLINIC | Age: OVER 89
End: 2025-03-24
Payer: MEDICARE

## 2025-03-24 VITALS — BODY MASS INDEX: 18 KG/M2 | HEIGHT: 66 IN | WEIGHT: 112 LBS

## 2025-03-24 DIAGNOSIS — M54.81 BILATERAL OCCIPITAL NEURALGIA: Primary | ICD-10-CM

## 2025-03-24 DIAGNOSIS — R51.9 PAIN OF SCALP: ICD-10-CM

## 2025-03-24 PROCEDURE — 1160F RVW MEDS BY RX/DR IN RCRD: CPT | Performed by: PHYSICAL MEDICINE & REHABILITATION

## 2025-03-24 PROCEDURE — 1159F MED LIST DOCD IN RCRD: CPT | Performed by: PHYSICAL MEDICINE & REHABILITATION

## 2025-03-24 PROCEDURE — 99204 OFFICE O/P NEW MOD 45 MIN: CPT | Performed by: PHYSICAL MEDICINE & REHABILITATION

## 2025-03-24 NOTE — PROGRESS NOTES
NEW PATIENT VISIT    CHIEF COMPLAINT  Neck Pain    HISTORY OF PRESENTING ILLNESS  Sarwat Townsend is a 89 year old male who presents for evaluation of neck pain.  Patient is referred to my office through his primary care physician complains of burning pain in the head and face without numbness and tingling or weakness.  Pain is rated 0/10 now and states that bad.  Has not her completed which is reviewed in full below.  No physical therapy.  Onset of symptoms was around a year ago.    Unfortunately had a fall around 3 years ago in 2022 where there was a laceration of left side of the occiput, CT scan was performed at that time but did not reveal any acute abnormalities.  Currently uses Tylenol twice a day.    PAST MEDICAL HISTORY  Past Medical History:    Arthritis    Essential hypertension    Hyperlipidemia    Prediabetes       PAST SURGICAL HISTORY  Past Surgical History:   Procedure Laterality Date    Hernia surgery      Other      bilateral cataract    Tonsillectomy         MEDICATIONS  Medications Ordered Prior to Encounter[1]    ALLERGIES  Allergies[2]    SOCIAL HISTORY   reports that he quit smoking about 45 years ago. His smoking use included cigarettes. He started smoking about 85 years ago. He has never been exposed to tobacco smoke. He has never used smokeless tobacco. He reports current alcohol use. He reports that he does not use drugs.    FAMILY HISTORY  Family History   Problem Relation Age of Onset    Heart Disorder Maternal Grandmother     Diabetes Mother        REVIEW OF SYSTEMS  Complete review of systems was performed and was negative except for those items stated in the History of Presenting Illness and Past Medical/Surgical History.    PHYSICAL EXAMINATION  GENERAL:  In no acute distress. Well-developed and well nourished.   SKIN: No rashes or open wounds involving the upper extremities and neck.  NEUROLOGIC:   Strength: 5/5 throughout bilateral upper and lower extremities in all major muscle  groups.   Sensation: intact light touch sensation throughout bilateral upper and lower extremities.   Reflexes: intact and symmetric in bilateral upper and lower extremities. Barcenas’s negative. Babinski downgoing bilaterally. No clonus.   Gait: able to heel walk, toe walk, and perform tandem gait.   MUSCULOSKELETAL:  Non tender over bilateral occipital nerves today.   Full flexion and extension without exacerbation of complaints.     REVIEW OF PRIOR X-RAYS/STUDIES  Independently reviewed the plain film radiographs of the cervical spine which reveals pretty significant forward angulation of the cervical spine likely secondary to patient positioning and posture with hyperkyphosis of the thoracic spine, there is pretty significant degenerative change in the cervical spine, apparent slight anterolisthesis of C3 over C4, there is facet arthritis at C3-4, additionally pretty significant loss of disc height at C4-5, most severe at C5-6 as well as C6-7.  There is endplate sclerosis at C5-6 with associated facet arthropathy at these levels.    IMPRESSION/DIAGNOSIS  Encounter Diagnoses   Name Primary?    Bilateral occipital neuralgia Yes    Pain of scalp        TREATMENT/PLAN  Elected to monitor for now.  Will continue use of neck support pillow as this is providing with some relief.  Prior to consideration of initiation of gabapentin versus occipital nerve blocks.    Education was provided regarding the above impression/diagnosis and treatment options/plan were discussed.  All questions were answered during today's visit.  Patient will contact clinic if any other questions or concerns.            Gerald Anderson DO  Interventional Spine and Sports Medicine Specialist   Physical Medicine and Rehabilitation  [address] [address]    [address] [place] [address]               [1]   Current Outpatient Medications on File Prior  to Visit   Medication Sig Dispense Refill    metFORMIN 850 MG Oral Tab Take 1 tablet (850 mg total) by mouth 2 (two) times daily with meals. 180 tablet 3    Cholecalciferol (VITAMIN D3) 25 MCG (1000 UT) Oral Cap Take 1 tablet by mouth daily.      ezetimibe 10 MG Oral Tab Take 1 tablet (10 mg total) by mouth daily. 90 tablet 3    amLODIPine 10 MG Oral Tab Take 1 tablet (10 mg total) by mouth daily. 90 tablet 3    clobetasol 0.05 % External Cream Use bid to red itchy areas on chest arms neck 45 g 0    Multiple Vitamins-Minerals (EYE-VITES) Oral Tab Take by mouth 2 (two) times a day.        B Complex-C-Folic Acid Oral Tab Take by mouth.       No current facility-administered medications on file prior to visit.   [2]   Allergies  Allergen Reactions    Lisinopril HIVES

## 2025-03-24 NOTE — PROGRESS NOTES
The following individual(s) verbally consented to be recorded using ambient AI listening technology and understand that they can each withdraw their consent to this listening technology at any point by asking the clinician to turn off or pause the recording:    Patient name: Sarwat TATUM Kristian

## 2025-03-27 ENCOUNTER — LAB ENCOUNTER (OUTPATIENT)
Dept: LAB | Age: OVER 89
End: 2025-03-27
Attending: INTERNAL MEDICINE
Payer: MEDICARE

## 2025-03-27 DIAGNOSIS — N30.00 ACUTE CYSTITIS WITHOUT HEMATURIA: ICD-10-CM

## 2025-03-27 LAB
BILIRUB UR QL: NEGATIVE
CLARITY UR: CLEAR
GLUCOSE UR-MCNC: 500 MG/DL
HGB UR QL STRIP.AUTO: NEGATIVE
LEUKOCYTE ESTERASE UR QL STRIP.AUTO: NEGATIVE
NITRITE UR QL STRIP.AUTO: NEGATIVE
PH UR: 5.5 [PH] (ref 5–8)
PROT UR-MCNC: 30 MG/DL
SP GR UR STRIP: 1.03 (ref 1–1.03)
UROBILINOGEN UR STRIP-ACNC: 2

## 2025-03-27 PROCEDURE — 81001 URINALYSIS AUTO W/SCOPE: CPT

## 2025-04-11 RX ORDER — AMLODIPINE BESYLATE 10 MG/1
10 TABLET ORAL DAILY
Qty: 90 TABLET | Refills: 3 | Status: ON HOLD | OUTPATIENT
Start: 2025-04-11

## 2025-04-11 NOTE — TELEPHONE ENCOUNTER
Refill passed per [facility] protocol.  Requested Prescriptions   Pending Prescriptions Disp Refills    AMLODIPINE 10 MG Oral Tab [Pharmacy Med Name: AMLODIPINE BESYLATE TABS 10MG] 90 tablet 3     Sig: TAKE 1 TABLET DAILY       Hypertension Medications Protocol Passed - 4/11/2025  1:44 PM        Passed - CMP or BMP in past 12 months        Passed - Last BP reading less than 140/90     BP Readings from Last 1 Encounters:   02/21/25 116/56               Passed - In person appointment or virtual visit in the past 12 mos or appointment in next 3 mos     Recent Outpatient Visits              2 weeks ago Bilateral occipital neuralgia    [facility]    Office Visit    1 month ago Medicare annual wellness visit, subsequent    [facility]    [facility] Visit    7 months ago Essential hypertension    [facility]    Office Visit    1 year ago Impacted cerumen of right ear    [facility]    Office Visit    1 year ago Medicare annual wellness visit, subsequent    [facility]    [facility] Visit          Future Appointments         Provider Department Appt Notes    In 4 months Kamlesh Durant MD [facility] 6 months                    Passed - EGFRCR or GFRNAA > 50     GFR Evaluation  EGFRCR: 82 , resulted on 2/21/2025          Passed - Medication is active on med list

## 2025-04-13 ENCOUNTER — APPOINTMENT (OUTPATIENT)
Dept: CT IMAGING | Facility: HOSPITAL | Age: OVER 89
End: 2025-04-13
Attending: EMERGENCY MEDICINE
Payer: MEDICARE

## 2025-04-13 ENCOUNTER — HOSPITAL ENCOUNTER (INPATIENT)
Facility: HOSPITAL | Age: OVER 89
LOS: 4 days | Discharge: NURSING, REHAB OR LONG-TERM CARE | End: 2025-04-17
Attending: EMERGENCY MEDICINE | Admitting: HOSPITALIST
Payer: MEDICARE

## 2025-04-13 ENCOUNTER — APPOINTMENT (OUTPATIENT)
Dept: CV DIAGNOSTICS | Facility: HOSPITAL | Age: OVER 89
End: 2025-04-13
Payer: MEDICARE

## 2025-04-13 DIAGNOSIS — R53.1 ACUTE RIGHT-SIDED WEAKNESS: ICD-10-CM

## 2025-04-13 DIAGNOSIS — I63.9 CEREBROVASCULAR ACCIDENT (CVA), UNSPECIFIED MECHANISM (HCC): Primary | ICD-10-CM

## 2025-04-13 DIAGNOSIS — R47.1 DYSARTHRIA: ICD-10-CM

## 2025-04-13 DIAGNOSIS — I21.4 NSTEMI (NON-ST ELEVATED MYOCARDIAL INFARCTION) (HCC): ICD-10-CM

## 2025-04-13 LAB
ALBUMIN SERPL-MCNC: 4 G/DL (ref 3.2–4.8)
ALP LIVER SERPL-CCNC: 223 U/L (ref 45–117)
ALT SERPL-CCNC: 52 U/L (ref 10–49)
ANION GAP SERPL CALC-SCNC: 10 MMOL/L (ref 0–18)
AST SERPL-CCNC: 57 U/L (ref ?–34)
ATRIAL RATE: 92 BPM
BASOPHILS # BLD AUTO: 0.06 X10(3) UL (ref 0–0.2)
BASOPHILS NFR BLD AUTO: 0.6 %
BILIRUB DIRECT SERPL-MCNC: 0.2 MG/DL (ref ?–0.3)
BILIRUB SERPL-MCNC: 0.7 MG/DL (ref 0.2–0.9)
BUN BLD-MCNC: 28 MG/DL (ref 9–23)
BUN/CREAT SERPL: 28 (ref 10–20)
CALCIUM BLD-MCNC: 9.1 MG/DL (ref 8.7–10.4)
CHLORIDE SERPL-SCNC: 97 MMOL/L (ref 98–112)
CHOLEST SERPL-MCNC: 143 MG/DL (ref ?–200)
CO2 SERPL-SCNC: 26 MMOL/L (ref 21–32)
CREAT BLD-MCNC: 1 MG/DL (ref 0.7–1.3)
DEPRECATED RDW RBC AUTO: 45.4 FL (ref 35.1–46.3)
EGFRCR SERPLBLD CKD-EPI 2021: 71 ML/MIN/1.73M2 (ref 60–?)
EOSINOPHIL # BLD AUTO: 0.11 X10(3) UL (ref 0–0.7)
EOSINOPHIL NFR BLD AUTO: 1.1 %
ERYTHROCYTE [DISTWIDTH] IN BLOOD BY AUTOMATED COUNT: 13.6 % (ref 11–15)
GLUCOSE BLD-MCNC: 226 MG/DL (ref 70–99)
GLUCOSE BLDC GLUCOMTR-MCNC: 210 MG/DL (ref 70–99)
GLUCOSE BLDC GLUCOMTR-MCNC: 219 MG/DL (ref 70–99)
HCT VFR BLD AUTO: 30.2 % (ref 39–53)
HDLC SERPL-MCNC: 74 MG/DL (ref 40–59)
HGB BLD-MCNC: 10.6 G/DL (ref 13–17.5)
IMM GRANULOCYTES # BLD AUTO: 0.03 X10(3) UL (ref 0–1)
IMM GRANULOCYTES NFR BLD: 0.3 %
LDLC SERPL CALC-MCNC: 55 MG/DL (ref ?–100)
LYMPHOCYTES # BLD AUTO: 0.86 X10(3) UL (ref 1–4)
LYMPHOCYTES NFR BLD AUTO: 8.3 %
MCH RBC QN AUTO: 31.9 PG (ref 26–34)
MCHC RBC AUTO-ENTMCNC: 35.1 G/DL (ref 31–37)
MCV RBC AUTO: 91 FL (ref 80–100)
MONOCYTES # BLD AUTO: 1.11 X10(3) UL (ref 0.1–1)
MONOCYTES NFR BLD AUTO: 10.7 %
NEUTROPHILS # BLD AUTO: 8.23 X10 (3) UL (ref 1.5–7.7)
NEUTROPHILS # BLD AUTO: 8.23 X10(3) UL (ref 1.5–7.7)
NEUTROPHILS NFR BLD AUTO: 79 %
NONHDLC SERPL-MCNC: 69 MG/DL (ref ?–130)
OSMOLALITY SERPL CALC.SUM OF ELEC: 289 MOSM/KG (ref 275–295)
P AXIS: 81 DEGREES
P-R INTERVAL: 150 MS
PLATELET # BLD AUTO: 224 10(3)UL (ref 150–450)
POTASSIUM SERPL-SCNC: 4.1 MMOL/L (ref 3.5–5.1)
PROT SERPL-MCNC: 7.1 G/DL (ref 5.7–8.2)
Q-T INTERVAL: 370 MS
QRS DURATION: 126 MS
QTC CALCULATION (BEZET): 457 MS
R AXIS: 57 DEGREES
RBC # BLD AUTO: 3.32 X10(6)UL (ref 3.8–5.8)
SODIUM SERPL-SCNC: 133 MMOL/L (ref 136–145)
T AXIS: 63 DEGREES
TRIGL SERPL-MCNC: 68 MG/DL (ref 30–149)
TROPONIN I SERPL HS-MCNC: 3741 NG/L (ref ?–53)
VENTRICULAR RATE: 92 BPM
VLDLC SERPL CALC-MCNC: 10 MG/DL (ref 0–30)
WBC # BLD AUTO: 10.4 X10(3) UL (ref 4–11)

## 2025-04-13 PROCEDURE — 70496 CT ANGIOGRAPHY HEAD: CPT | Performed by: EMERGENCY MEDICINE

## 2025-04-13 PROCEDURE — 99223 1ST HOSP IP/OBS HIGH 75: CPT | Performed by: HOSPITALIST

## 2025-04-13 PROCEDURE — 70498 CT ANGIOGRAPHY NECK: CPT | Performed by: EMERGENCY MEDICINE

## 2025-04-13 PROCEDURE — 93306 TTE W/DOPPLER COMPLETE: CPT

## 2025-04-13 PROCEDURE — 70450 CT HEAD/BRAIN W/O DYE: CPT | Performed by: EMERGENCY MEDICINE

## 2025-04-13 RX ORDER — SENNOSIDES 8.6 MG
17.2 TABLET ORAL NIGHTLY PRN
Status: DISCONTINUED | OUTPATIENT
Start: 2025-04-13 | End: 2025-04-17

## 2025-04-13 RX ORDER — SODIUM PHOSPHATE, DIBASIC AND SODIUM PHOSPHATE, MONOBASIC 7; 19 G/230ML; G/230ML
1 ENEMA RECTAL ONCE AS NEEDED
Status: DISCONTINUED | OUTPATIENT
Start: 2025-04-13 | End: 2025-04-17

## 2025-04-13 RX ORDER — BISACODYL 10 MG
10 SUPPOSITORY, RECTAL RECTAL
Status: DISCONTINUED | OUTPATIENT
Start: 2025-04-13 | End: 2025-04-17

## 2025-04-13 RX ORDER — ASPIRIN 81 MG/1
324 TABLET, CHEWABLE ORAL ONCE
Status: DISCONTINUED | OUTPATIENT
Start: 2025-04-13 | End: 2025-04-13

## 2025-04-13 RX ORDER — ACETAMINOPHEN 325 MG/1
650 TABLET ORAL EVERY 4 HOURS PRN
Status: DISCONTINUED | OUTPATIENT
Start: 2025-04-13 | End: 2025-04-17

## 2025-04-13 RX ORDER — NICOTINE POLACRILEX 4 MG
15 LOZENGE BUCCAL
Status: DISCONTINUED | OUTPATIENT
Start: 2025-04-13 | End: 2025-04-17

## 2025-04-13 RX ORDER — POLYETHYLENE GLYCOL 3350 17 G/17G
17 POWDER, FOR SOLUTION ORAL DAILY PRN
Status: DISCONTINUED | OUTPATIENT
Start: 2025-04-13 | End: 2025-04-17

## 2025-04-13 RX ORDER — METOCLOPRAMIDE HYDROCHLORIDE 5 MG/ML
5 INJECTION INTRAMUSCULAR; INTRAVENOUS EVERY 8 HOURS PRN
Status: DISCONTINUED | OUTPATIENT
Start: 2025-04-13 | End: 2025-04-17

## 2025-04-13 RX ORDER — ATORVASTATIN CALCIUM 40 MG/1
40 TABLET, FILM COATED ORAL NIGHTLY
Status: DISCONTINUED | OUTPATIENT
Start: 2025-04-14 | End: 2025-04-17

## 2025-04-13 RX ORDER — DEXTROSE MONOHYDRATE 25 G/50ML
50 INJECTION, SOLUTION INTRAVENOUS
Status: DISCONTINUED | OUTPATIENT
Start: 2025-04-13 | End: 2025-04-17

## 2025-04-13 RX ORDER — ATORVASTATIN CALCIUM 40 MG/1
40 TABLET, FILM COATED ORAL NIGHTLY
Status: DISCONTINUED | OUTPATIENT
Start: 2025-04-13 | End: 2025-04-13

## 2025-04-13 RX ORDER — HEPARIN SODIUM 5000 [USP'U]/ML
5000 INJECTION, SOLUTION INTRAVENOUS; SUBCUTANEOUS EVERY 8 HOURS SCHEDULED
Status: DISCONTINUED | OUTPATIENT
Start: 2025-04-13 | End: 2025-04-17

## 2025-04-13 RX ORDER — ASPIRIN 300 MG/1
300 SUPPOSITORY RECTAL ONCE
Status: COMPLETED | OUTPATIENT
Start: 2025-04-13 | End: 2025-04-13

## 2025-04-13 RX ORDER — ACETAMINOPHEN 500 MG
500 TABLET ORAL EVERY 4 HOURS PRN
Status: DISCONTINUED | OUTPATIENT
Start: 2025-04-13 | End: 2025-04-17

## 2025-04-13 RX ORDER — ONDANSETRON 2 MG/ML
4 INJECTION INTRAMUSCULAR; INTRAVENOUS EVERY 6 HOURS PRN
Status: DISCONTINUED | OUTPATIENT
Start: 2025-04-13 | End: 2025-04-17

## 2025-04-13 RX ORDER — NICOTINE POLACRILEX 4 MG
30 LOZENGE BUCCAL
Status: DISCONTINUED | OUTPATIENT
Start: 2025-04-13 | End: 2025-04-17

## 2025-04-13 RX ORDER — ACETAMINOPHEN 650 MG/1
650 SUPPOSITORY RECTAL EVERY 4 HOURS PRN
Status: DISCONTINUED | OUTPATIENT
Start: 2025-04-13 | End: 2025-04-17

## 2025-04-13 RX ORDER — ASPIRIN 81 MG/1
81 TABLET, CHEWABLE ORAL DAILY
Status: DISCONTINUED | OUTPATIENT
Start: 2025-04-14 | End: 2025-04-17

## 2025-04-13 RX ORDER — SODIUM CHLORIDE 9 MG/ML
INJECTION, SOLUTION INTRAVENOUS CONTINUOUS
Status: DISCONTINUED | OUTPATIENT
Start: 2025-04-13 | End: 2025-04-15

## 2025-04-13 NOTE — ED PROVIDER NOTES
Patient Seen in: [facility] Emergency Department    History     Chief Complaint   Patient presents with    Neurologic Problem     Stated Complaint: r side weakness, blurred vision, last spoken to  in the am by family    Subjective:   HPI      89-year-old male with history of hypertension presents with difficulty speaking, right-sided weakness, blurred vision.  Noticed difficulty speaking on the phone today, called EMS.  Patient also reports blurred vision in both eyes.  He looks at his right hand as if to describe weakness, though he gives up, unable to describe    Objective:     Past Medical History:    Arthritis    Essential hypertension    Hyperlipidemia    Prediabetes              Past Surgical History:   Procedure Laterality Date    Hernia surgery      Other      bilateral cataract    Tonsillectomy                  Social History     Socioeconomic History    Marital status:    Tobacco Use    Smoking status: Former     Current packs/day: 0.00     Types: Cigarettes     Start date:      Quit date:      Years since quittin.3     Passive exposure: Never    Smokeless tobacco: Never   Vaping Use    Vaping status: Never Used   Substance and Sexual Activity    Alcohol use: Yes     Comment: 1/month    Drug use: Never   Other Topics Concern    Reaction to local anesthetic No                  Physical Exam     ED Triage Vitals [25 1458]   /53   Pulse 95   Resp 26   Temp 97.8 °F (36.6 °C)   Temp src Oral   SpO2 99 %   O2 Device None (Room air)       Current Vitals:   Vital Signs  BP: 112/53  Pulse: 95  Resp: 26  Temp: 97.8 °F (36.6 °C)  Temp src: Oral    Oxygen Therapy  SpO2: 99 %  O2 Device: None (Room air)        Physical Exam  Vital signs reviewed. Nursing note reviewed.  Constitutional: Well-developed. Well-nourished. In no acute distress  HENT: Mucous membranes moist.   EYES: PERRL. EOMI.   NECK: Supple.   CARDIAC: Normal rate. Normal S1/ S2. 2+ distal pulses. No  edema  PULM/CHEST: Clear to auscultation bilaterally. No wheezes  ABD: Soft, non-tender, non-distended  RECTAL: deferred  Extremities: Full ROM  NEURO: Alert. CN II-XII intact.  Weakness in right hip flexion compared to left.  Weakness in right hand  strength.  Sensation intact to light touch.  Difficulty with word finding  SKIN: Warm and dry. No rash or lesions.  PSYCH: Normal judgment. Normal affect.           ED Course     Labs Reviewed   CBC WITH DIFFERENTIAL WITH PLATELET - Abnormal; Notable for the following components:       Result Value    RBC 3.32 (*)     HGB 10.6 (*)     HCT 30.2 (*)     Neutrophil Absolute Prelim 8.23 (*)     Neutrophil Absolute 8.23 (*)     Lymphocyte Absolute 0.86 (*)     Monocyte Absolute 1.11 (*)     All other components within normal limits   BASIC METABOLIC PANEL (8) - Abnormal; Notable for the following components:    Glucose 226 (*)     Sodium 133 (*)     Chloride 97 (*)     BUN 28 (*)     BUN/CREA Ratio 28.0 (*)     All other components within normal limits   HEPATIC FUNCTION PANEL (7) - Abnormal; Notable for the following components:    AST 57 (*)     ALT 52 (*)     Alkaline Phosphatase 223 (*)     All other components within normal limits   TROPONIN I HIGH SENSITIVITY - Abnormal; Notable for the following components:    Troponin I (High Sensitivity) 3,741 (*)     All other components within normal limits   POCT GLUCOSE - Abnormal; Notable for the following components:    POC Glucose  210 (*)     All other components within normal limits   LIPID PANEL   RAINBOW DRAW BLUE   RAINBOW DRAW GOLD     EKG    Rate, intervals and axes as noted on EKG Report.  Rate: 92  Rhythm: Sinus Rhythm  Reading: No ectopy, nonspecific ST and T wave changes                                     MDM      Assessment:Patient is a 89 year old male presenting to the ED due to right sided weakness, difficulty with speech, blurred vision.    Comorbidities/chronic illnesses impacting care: HTN    History  obtained from: patient, EMS    External records and previous hospitalization records reviewed and documented below    Consideration of Social Determinants of Health and Impact on Medical Decision Making:  Housing/Transportation/Financial Strain/Access to healthcare/Food insecurity/family or Community support/Language and Literacy/Substance abuse/Mental health concerns/Disabilities     -none    Radiography/Imaging:  CT BRAIN OR HEAD (CPT=70450)   Final Result   PROCEDURE: CT BRAIN OR HEAD (CPT=70450)       COMPARISON: [facility], CT BRAIN OR HEAD (CPT=70450),    1/17/2022, 9:07 PM.       INDICATIONS: Right-sided weakness and blurred vision.       TECHNIQUE: CT images were obtained without contrast material.  Automated    exposure control for dose reduction was used.  Dose information is    transmitted to the ACR (American College of Radiology) NRDR (National    Radiology Data Registry) which includes the    Dose Index Registry.        FINDINGS:    CSF SPACES: No hydrocephalus, subarachnoid hemorrhage, or mass.  No    midline shift.     CEREBRUM: Edema in left occipital lobe.  Smaller focus of encephalomalacia    in the right occipital lobe.  No mass or hemorrhage.    Decreased attenuation in periventricular and subcortical white matter    bilaterally.  No mass or hemorrhage.       CEREBELLUM: Old left superior cerebellar infarct.  No edema, hemorrhage,    mass or acute infarct.   BRAINSTEM: No edema, hemorrhage, mass or acute infarct.   CALVARIUM: Skull base and calvarium are intact.   SINUSES: Limited views demonstrate no significant mucosal thickening or    fluid.     ORBITS: Previous bilateral cataract surgery.       OTHER: Atherosclerotic calcification cavernous carotid and vertebral    arteries.                     =====   CONCLUSION:    1. Subacute/recent left occipital cortical infarct.   2. Small probably chronic right occipital cortical infarct.   3. Old left cerebellar infarct.   4. Changes  of chronic small vessel disease in cerebral white matter.   5. Large vessel intracranial atherosclerosis.               Dictated by (CST): Rocael Bruner MD on 4/13/2025 at 3:37 PM        Finalized by (CST): Rocael Bruner MD on 4/13/2025 at 3:42 PM               CTA BRAIN + CTA CAROTIDS (CPT=70496/14634)    (Results Pending)           ED course  Patient arrived to ER vital signs normal.  He is having word finding difficulty, weakness in his right hip flexor as well as right  strength.  Otherwise visual fields seem intact despite his complaint of blurred vision.  Last known normal was roughly 48 hours ago, not a candidate for thrombolysis.  Do suspect he has had a stroke.  Will send for CT head, start labs.  Will likely need admission with neurology consult    Laboratory results above were independently viewed and interpreted as: Mild anemia, hyperglycemia, elevated troponin consistent with NSTEMI likely demand ischemia  Radiology: ordered and independently interpreted as: CT head negative for bleed, old strokes noted    Per radiology, subacute to recent left occipital cortical infarct.  Also with NSTEMI, likely demand ischemia.  Will give aspirin, consult cardiology, neurology, plan for admission    Time spent providing Critical Care Services to the patient (excluding time spent performing separately billable procedures): 45 minutes.  Most of the time was spent at the bedside of the patient, reevaluating the patient and vital signs, explaining conditions and results to the patient and/or family, as well as speaking to the PMD and/or Consultant(s).              Medications   aspirin chewable tab 324 mg (has no administration in time range)             Admission disposition: 4/13/2025  3:59 PM           Medical Decision Making      Disposition and Plan     Clinical Impression:  1. Cerebrovascular accident (CVA), unspecified mechanism (HCC)    2. Acute right-sided weakness    3. Dysarthria    4. NSTEMI (non-ST  elevated myocardial infarction) (HCC)         Disposition:  Admit  4/13/2025  3:59 pm    Follow-up:  No follow-up provider specified.  We recommend that you schedule follow up care with a primary care provider within the next three months to obtain basic health screening including reassessment of your blood pressure.      Medications Prescribed:  Current Discharge Medication List          Supplementary Documentation:         Hospital Problems       Present on Admission  Date Reviewed: 3/24/2025          ICD-10-CM Noted POA    * (Principal) Cerebrovascular accident (CVA), unspecified mechanism (HCC) I63.9 4/13/2025 Unknown

## 2025-04-13 NOTE — H&P
[facility]  part of [facility]    History & Physical    Sarwat Townsend Patient Status:  Emergency    1932 MRN [id number]   Location [facility] EMERGENCY DEPARTMENT Attending Demar Braxton MD   Hosp Day # 0 PCP Kamlesh Durant MD     Date:  2025  Date of Admission:  2025    History provided by:patient  Chief Complaint:     Chief Complaint   Patient presents with    Neurologic Problem       HPI:   Sarwat Townsend is a 89 year old male with a history of HTN, HLD and DM2, who presents with right-sided weakness, blurry vision, slurred speech and shortness of breath for 2 days. Patient stated he developed right-sided weakness, blurry vision, slurred speech and shortness of breath about 2 days ago, but did not seek medical attention. When his family called him today, he was noticed to have speech problem. EMS was then called, and the patient was brought to hospital.    In ED, patient has normal vitals, no elevated blood pressure. Right-sided weakness and aphasia were noted. Stroke alert was called. Blood work showed troponin 3741. No acute ischemic changes on ECG. CT head showed subacute left occipital cortical infarct, small chronic right occipital cortical infarct, and old left cerebellar infarct. Aspirin 324 mg given. Neuro and Cards consulted.    History   Past Medical History[1]  Past Surgical History[2]  Family History[3]  Social History:  Short Social Hx on File[4]  Allergies/Medications:   Allergies: Allergies[5]  Prescriptions Prior to Admission[6]    Review of Systems:   Negative except depicted in HPI.    A comprehensive 12 point review of systems was completed.  Pertinent positives and negatives noted in the the HPI.    Physical Exam:   Vital Signs:  Blood pressure 112/53, pulse 95, temperature 97.8 °F (36.6 °C), temperature source Oral, resp. rate 26, SpO2 99%.     GENERAL:  The patient appeared to be in no distress.    SKIN:  Warm and hydrated  HEENT:  Head was  atraumatic and normocephalic.  Eyes:  Extraocular muscles were intact.  Sclera was anicteric.  Pupils were equally reactive to light.  Ears:  There were no lesions.  Nose:  No lesions were noted.   NECK:  Supple.  There was no JVD.   CHEST:  Symmetrical movement on inspiration  HEART:  S1 and S2 heard.  RRR   LUNGS:  Air entry was good.  No crackles or wheezes   ABDOMEN: Soft and non-tender.  Bowel sounds were present.  MUSCULOSKELETAL:  There was no deformity.    EXTREMITIES: There was no edema, clubbing or cyanosis  NEUROLOGICAL:  right-sided weakness, slurred speech   PSYCHIATRIC: Calm and cooperative     Results:     Lab Results   Component Value Date    WBC 10.4 04/13/2025    HGB 10.6 (L) 04/13/2025    HCT 30.2 (L) 04/13/2025    .0 04/13/2025    CREATSERUM 1.00 04/13/2025    BUN 28 (H) 04/13/2025     (L) 04/13/2025    K 4.1 04/13/2025    CL 97 (L) 04/13/2025    CO2 26.0 04/13/2025     (H) 04/13/2025    CA 9.1 04/13/2025    ALB 4.0 04/13/2025    ALKPHO 223 (H) 04/13/2025    BILT 0.7 04/13/2025    TP 7.1 04/13/2025    AST 57 (H) 04/13/2025    ALT 52 (H) 04/13/2025    TSH 1.720 02/21/2025       CT BRAIN OR HEAD (CPT=70450)  Result Date: 4/13/2025  CONCLUSION:  1. Subacute/recent left occipital cortical infarct. 2. Small probably chronic right occipital cortical infarct. 3. Old left cerebellar infarct. 4. Changes of chronic small vessel disease in cerebral white matter. 5. Large vessel intracranial atherosclerosis.    Dictated by (CST): Rocael Bruner MD on 4/13/2025 at 3:37 PM     Finalized by (CST): Rocael Bruner MD on 4/13/2025 at 3:42 PM          EKG 12 Lead  Result Date: 4/13/2025  Normal sinus rhythm Right atrial enlargement Right bundle branch block Abnormal ECG No previous ECGs found in [facility]      Assessment/Plan:   Sarwat Townsend is a 89 year old male with a history of HTN, HLD and DM2, who presents with right-sided weakness, blurry vision, slurred speech and shortness of breath for 2  days.     # Subacute ischemic stroke  - CT head showed subacute left occipital cortical infarct, small chronic right occipital cortical infarct, and old left cerebellar infarct.   - Aspirin 324 mg given, continue aspirin 81 mg daily and atorvastatin 40 mg   - Patient failed bedside speech eval  - PT, OT, SLP  - TSH, A1c, LDL  - Neuro consulted    # NSTEMI  - troponin 3741, no acute ischemic changes on ECG.   - trend troponin  - Tele  - Cards consulted    # Hypertension  - soft BP, hold home amlodipine    # DM2  - SSI Q6H    # Hyponatremia  - Na 133  - NS infusion  - BMP in AM    Diet: NPO  PT/OT: consulted  DVT ppx: heparin  Line: none  Code status: Full  Dispo: expect greater than 2 midnights    MDM: high Chris Hoffman MD, PhD  Message via Secure Chat  [facility] Hospitalist         [1]   Past Medical History:   Arthritis    Essential hypertension    Hyperlipidemia    Prediabetes   [2]   Past Surgical History:  Procedure Laterality Date    Hernia surgery      Other      bilateral cataract    Tonsillectomy     [3]   Family History  Problem Relation Age of Onset    Heart Disorder Maternal Grandmother     Diabetes Mother    [4]   Social History  Socioeconomic History    Marital status:    Tobacco Use    Smoking status: Former     Current packs/day: 0.00     Types: Cigarettes     Start date:      Quit date:      Years since quittin.3     Passive exposure: Never    Smokeless tobacco: Never   Vaping Use    Vaping status: Never Used   Substance and Sexual Activity    Alcohol use: Yes     Comment: 1/month    Drug use: Never   Other Topics Concern    Reaction to local anesthetic No   [5]   Allergies  Allergen Reactions    Lisinopril HIVES   [6] (Not in a hospital admission)

## 2025-04-13 NOTE — ED INITIAL ASSESSMENT (HPI)
Brought via ems, called by family for rule out CVA. Pt c/o blurred vision onset 2 days ago.    Per EMS, R side weakness noted to RUE, pt unable to describe feeling    Pt presents with expressive aphasia vs stuttering as well    MD at bs upon arrival

## 2025-04-13 NOTE — ED NOTES
Received pt a/ox2, slurred and stuttering speech with expressive aphasia, nad, labored breathing.    Pt c/o blurred vision, weakness and stating \" I think I had a mini stroke\"    Per EMS, family called d/t pt not at baseline and last spoke to him yesterday in the morning. No one has physically seen the patient    Upon arrival, pt weak to RUE and RLE. Unable to make fist to R side. +expressive aphasia and slurred speech    Placed on all continuous monitors    Vitals stable, glucose >200. MD called to bedside. No stroke alert called d/t last seen normal date/time    Care endorsed to phuong MORA  Stable upon handoff

## 2025-04-13 NOTE — CONSULTS
[facility]  part of [facility]    Advanced Heart Failure Consultation    Sarwat Townsend Patient Status:  Emergency    1932 MRN [id number]   Location [facility] EMERGENCY DEPARTMENT Attending Demar Braxton MD   Hosp Day # 0 PCP Kamlesh Durant MD     Reason for Consultation:  Elevated troponin     History of Present Illness:  Sarwat Townsend is a a(n) 89 year old male with PMHX of HTN, HLD, and otherwise, no prior cardiac history presents due to 2-3 days of weakness of stroke like symptoms. Family at bedside and per them, he has been more weak and slow to respond for the past few days and with weakened coordination. Granddaughter was visiting him today and noticed he was slumped over the kitchen sink and she knocked on door and he was able to let her in but she noticed he had slurred speech and mild confusion as well as difficulty getting words out. He did not fall or have LOC per their records. She called EMS and patient was brought in found to have stroke on CT. He had RUE and RLE sided weakness and unable to make fist on right side with expressive aphasia and slurred speech. He was reseting upon my evaluation.     Cardiology was consulted for elevated troponin of 3741. He denies prior cardiac history. Denies prior CAD, MI, heart failure, or arrthmias. He denies having cp or angina lately. Denies cardiac symptoms otherwise. Per the family, he is fairly active.     EKG reviewed and SR with RBBB, but no acute ischemic changes noted. No prior for comparison. He is a .     Upon my evaluation, he is alert and oriented x3, able to answers some questions and was able to tell me why he came to ED for evaluation.     HD stable, HR 70s-80s, no acute angina.     History:  Past Medical History[1]  Past Surgical History[2]  Family History[3]   reports that he quit smoking about 45 years ago. His smoking use included cigarettes. He started smoking about 85 years ago. He has never been  exposed to tobacco smoke. He has never used smokeless tobacco. He reports current alcohol use. He reports that he does not use drugs.    Allergies:  Allergies[4]    Medications:  Current Hospital Medications[5]    Review of Systems:  All systems were reviewed and are negative except as described above in HPI.     Physical Exam:  Blood pressure 112/53, pulse 95, temperature 97.8 °F (36.6 °C), temperature source Oral, resp. rate 26, SpO2 99%.  Temp (24hrs), Av.8 °F (36.6 °C), Min:97.8 °F (36.6 °C), Max:97.8 °F (36.6 °C)    Wt Readings from Last 3 Encounters:   25 112 lb (50.8 kg)   25 112 lb (50.8 kg)   24 116 lb (52.6 kg)     No intake or output data in the 24 hours ending 25 1630    Telemetry: SR    General: Alert and oriented in no apparent distress.  HEENT: slurred speech and expressive aphasia present, no focal facial droop noted.   Neck: No JVD, normal ROM.   Cardiac: Regular rate and rhythm, S1, S2 normal, no rubs or gallops noted.   Lungs: Clear without wheezes, rales, rhonchi or dullness.  Normal excursions and effort.  Abdomen: Soft, non-tender.   Extremities: Without clubbing, cyanosis or edema.  Peripheral pulses are 2+.  Neurologic: Alert and oriented, occasionally follows commands, expressive aphasia and right sided weakness present.   Skin: Warm and dry.     Laboratories and Data:  Diagnostics:  EKG: Nsr with RBBB, no acute ischemic changes noted.   Echo: pending.   Stress Test: N/A  Cath: N/A  CTA Chest: N/A    Labs:   Lab Results   Component Value Date    WBC 10.4 2025    RBC 3.32 2025    HGB 10.6 2025    HCT 30.2 2025    MCV 91.0 2025    MCH 31.9 2025    MCHC 35.1 2025    RDW 13.6 2025    .0 2025     No results found for: \"PT\", \"INR\"  No results for input(s): \"BNPML\" in the last 168 hours.  BUN (mg/dL)   Date Value   2025 28 (H)   2025 20   2024 16     Creatinine (mg/dL)   Date Value    04/13/2025 1.00   02/21/2025 0.84   08/20/2024 0.81       Assessment/Plan:  Problem List[6]    Elevated troponin (suspect type 2 demand)   - elevated troponin on admission at 3741; no acute angina, EKG non acute  - suspect secondary to acute neurological event causing transient cardiac involvement with neurogenic heart syndrome with possible stress induced  - no acute angina, no prior cardiac history  - discussed with patient, and plan to trend troponin, aspirin daily, risk factor modification, no acute plan for heparin gtt at this time (defer to neurology as work up from stroke standpoint), and obtain echo for evaluation  - suspect likely stress induced ischemia / potential cardiomyopathy  - continue medical management for now, and continue to assess; eventually can consider ischemic evaluation but no acute signs for now (no angina and EKG non acute)    Acute CVA   - presented with aphasia along with right sided weakness and found to have CTH with subacute L occipital cortical infarct along with prior cerebellar infarcts and chronic small vessel changes with large vessel atherosclerosis  - neurology consult, risk factor modification, primary management.     HTN   - monitor on current management, can resume oral medications as BP stabilizes. Hold for now pending swallow evaluation.     HLD  - risk factor modification, medical therapy.     Discussed with ED physician, and family at bedside    Jefferson Hernandez MD   Advanced Heart Failure and Transplant Cardiology   [facility]     C5       [1]   Past Medical History:   Arthritis    Essential hypertension    Hyperlipidemia    Prediabetes   [2]   Past Surgical History:  Procedure Laterality Date    Hernia surgery      Other      bilateral cataract    Tonsillectomy     [3]   Family History  Problem Relation Age of Onset    Heart Disorder Maternal Grandmother     Diabetes Mother    [4]   Allergies  Allergen Reactions    Lisinopril HIVES   [5]    Current Facility-Administered Medications:     aspirin 300 MG rectal suppository 300 mg, 300 mg, Rectal, Once  [6]   Patient Active Problem List  Diagnosis    Essential hypertension    Hyperlipidemia    Arthritis    Prediabetes    Cerebrovascular accident (CVA), unspecified mechanism (HCC)

## 2025-04-13 NOTE — PLAN OF CARE
Patient arrived to unit. Admission and med rec done. Dr. Hoffman notified of admission complete. IV fluids started. Family at bedside.

## 2025-04-14 ENCOUNTER — APPOINTMENT (OUTPATIENT)
Dept: MRI IMAGING | Facility: HOSPITAL | Age: OVER 89
End: 2025-04-14
Attending: Other
Payer: MEDICARE

## 2025-04-14 PROBLEM — R53.1 ACUTE RIGHT-SIDED WEAKNESS: Status: RESOLVED | Noted: 2025-01-01 | Resolved: 2025-01-01

## 2025-04-14 PROBLEM — I63.9 CEREBROVASCULAR ACCIDENT (CVA) (HCC): Status: ACTIVE | Noted: 2025-04-13

## 2025-04-14 PROBLEM — R47.1 DYSARTHRIA: Status: RESOLVED | Noted: 2025-04-13 | Resolved: 2025-04-14

## 2025-04-14 PROBLEM — I63.9 OCCIPITAL STROKE (HCC): Status: ACTIVE | Noted: 2025-04-13

## 2025-04-14 PROBLEM — I63.9 OCCIPITAL STROKE (HCC): Status: ACTIVE | Noted: 2025-01-01

## 2025-04-14 PROBLEM — I63.9 OCCIPITAL STROKE (HCC): Status: ACTIVE | Noted: 2025-04-14

## 2025-04-14 PROBLEM — R53.1 ACUTE RIGHT-SIDED WEAKNESS: Status: RESOLVED | Noted: 2025-04-13 | Resolved: 2025-04-14

## 2025-04-14 PROBLEM — I63.9 CEREBROVASCULAR ACCIDENT (CVA) (HCC): Status: ACTIVE | Noted: 2025-01-01

## 2025-04-14 PROBLEM — R47.1 DYSARTHRIA: Status: RESOLVED | Noted: 2025-01-01 | Resolved: 2025-01-01

## 2025-04-14 LAB
ANION GAP SERPL CALC-SCNC: 14 MMOL/L (ref 0–18)
BUN BLD-MCNC: 22 MG/DL (ref 9–23)
BUN/CREAT SERPL: 32.4 (ref 10–20)
CALCIUM BLD-MCNC: 8.7 MG/DL (ref 8.7–10.4)
CHLORIDE SERPL-SCNC: 102 MMOL/L (ref 98–112)
CO2 SERPL-SCNC: 21 MMOL/L (ref 21–32)
CREAT BLD-MCNC: 0.68 MG/DL (ref 0.7–1.3)
DEPRECATED RDW RBC AUTO: 46.5 FL (ref 35.1–46.3)
EGFRCR SERPLBLD CKD-EPI 2021: 87 ML/MIN/1.73M2 (ref 60–?)
ERYTHROCYTE [DISTWIDTH] IN BLOOD BY AUTOMATED COUNT: 13.6 % (ref 11–15)
EST. AVERAGE GLUCOSE BLD GHB EST-MCNC: 203 MG/DL (ref 68–126)
GLUCOSE BLD-MCNC: 117 MG/DL (ref 70–99)
GLUCOSE BLDC GLUCOMTR-MCNC: 103 MG/DL (ref 70–99)
GLUCOSE BLDC GLUCOMTR-MCNC: 143 MG/DL (ref 70–99)
GLUCOSE BLDC GLUCOMTR-MCNC: 227 MG/DL (ref 70–99)
GLUCOSE BLDC GLUCOMTR-MCNC: 244 MG/DL (ref 70–99)
GLUCOSE BLDC GLUCOMTR-MCNC: 278 MG/DL (ref 70–99)
GLUCOSE BLDC GLUCOMTR-MCNC: 98 MG/DL (ref 70–99)
HBA1C MFR BLD: 8.7 % (ref ?–5.7)
HCT VFR BLD AUTO: 28.7 % (ref 39–53)
HGB BLD-MCNC: 9.6 G/DL (ref 13–17.5)
MAGNESIUM SERPL-MCNC: 1.9 MG/DL (ref 1.6–2.6)
MCH RBC QN AUTO: 31.2 PG (ref 26–34)
MCHC RBC AUTO-ENTMCNC: 33.4 G/DL (ref 31–37)
MCV RBC AUTO: 93.2 FL (ref 80–100)
OSMOLALITY SERPL CALC.SUM OF ELEC: 288 MOSM/KG (ref 275–295)
PLATELET # BLD AUTO: 181 10(3)UL (ref 150–450)
POTASSIUM SERPL-SCNC: 3.6 MMOL/L (ref 3.5–5.1)
Q-T INTERVAL: 384 MS
QRS DURATION: 126 MS
QTC CALCULATION (BEZET): 490 MS
R AXIS: 84 DEGREES
RBC # BLD AUTO: 3.08 X10(6)UL (ref 3.8–5.8)
SODIUM SERPL-SCNC: 137 MMOL/L (ref 136–145)
T AXIS: 106 DEGREES
TROPONIN I SERPL HS-MCNC: 3188 NG/L (ref ?–53)
TROPONIN I SERPL HS-MCNC: 3767 NG/L (ref ?–53)
TROPONIN I SERPL HS-MCNC: 4377 NG/L (ref ?–53)
TROPONIN I SERPL HS-MCNC: 4387 NG/L (ref ?–53)
TSI SER-ACNC: 1.26 UIU/ML (ref 0.55–4.78)
VENTRICULAR RATE: 98 BPM
WBC # BLD AUTO: 7.7 X10(3) UL (ref 4–11)

## 2025-04-14 PROCEDURE — 99233 SBSQ HOSP IP/OBS HIGH 50: CPT | Performed by: HOSPITALIST

## 2025-04-14 PROCEDURE — 99223 1ST HOSP IP/OBS HIGH 75: CPT | Performed by: OTHER

## 2025-04-14 PROCEDURE — 70551 MRI BRAIN STEM W/O DYE: CPT | Performed by: OTHER

## 2025-04-14 NOTE — PLAN OF CARE
Problem: Patient Centered Care  Goal: Patient preferences are identified and integrated in the patient's plan of care  Description: Interventions:- What would you like us to know as we care for you? From home alone - Provide timely, complete, and accurate information to patient/family- Incorporate patient and family knowledge, values, beliefs, and cultural backgrounds into the planning and delivery of care- Encourage patient/family to participate in care and decision-making at the level they choose- Honor patient and family perspectives and choices  Outcome: Progressing     Problem: Diabetes/Glucose Control  Goal: Glucose maintained within prescribed range  Description: INTERVENTIONS:- Monitor Blood Glucose as ordered- Assess for signs and symptoms of hyperglycemia and hypoglycemia- Administer ordered medications to maintain glucose within target range- Assess barriers to adequate nutritional intake and initiate nutrition consult as needed- Instruct patient on self management of diabetes  Outcome: Progressing     Problem: Patient/Family Goals  Goal: Patient/Family Long Term Goal  Description: Patient's Long Term Goal: to go home Interventions:- follow MD orders- See additional Care Plan goals for specific interventions  Outcome: Progressing  Goal: Patient/Family Short Term Goal  Description: Patient's Short Term Goal: manage symptoms Interventions: - follow MD orders - See additional Care Plan goals for specific interventions  Outcome: Progressing

## 2025-04-14 NOTE — PHYSICAL THERAPY NOTE
PHYSICAL THERAPY EVALUATION - INPATIENT     Room Number: 313/313-A  Evaluation Date: 4/14/2025  Type of Evaluation: Initial   Physician Order: PT Eval and Treat    Presenting Problem: acute CVA, new onset a-fib;  right sided weakness, blurred vision and expressive aphasia, word finding difficulties  Co-Morbidities : HTN, HLD, DM2, prior CVA  Reason for Therapy: Mobility Dysfunction and Discharge Planning    PHYSICAL THERAPY ASSESSMENT   Patient is a 89 year old male admitted 4/13/2025 for aphasia, difficulty talking and walking/moving, blurred vision, new acute CVA and NSTEMI, new onset a-fib per RN.  Prior to admission, patient's baseline is independent and lives in a home alone. He is normally active in the daytime and does all home mgmt and ADL, does use a cane sometimes. Family in area checks in.  Patient is currently functioning below baseline with bed mobility, transfers, gait, stair negotiation, maintaining seated position, standing prolonged periods, and performing household tasks.  Patient is requiring moderate assist and maximum assist as a result of the following impairments: decreased endurance/aerobic capacity, impaired sitting and standing balance, impaired coordination, impaired motor planning, decreased muscular endurance, medical status, and aphasia/need for hand over hand instruction and impaired vision .  Physical Therapy will continue to follow for duration of hospitalization.    Patient will benefit from continued skilled PT Services to facilitate return to prior level of function as patient demonstrates high motivation with excellent tolerance to an intensive therapy program . Patient works well with minimal rest during session, motivated to return home independently and plans to hire a caregiver as needed.    PLAN DURING HOSPITALIZATION  Nursing Mobility Recommendation : 1 Assist  PT Device Recommendation: Rolling walker, Gait belt  PT Treatment Plan: Bed mobility, Coordination, Patient  education, Gait training, Neuromuscular re-educate, Transfer training, Balance training, Stoop training  Rehab Potential : Good  Frequency (Obs): Daily     PHYSICAL THERAPY MEDICAL/SOCIAL HISTORY   History related to current admission: r side weakness, blurred vision, last spoken to 4/12 in the am by family and brought in by family 4/13   89-year-old male with history of hypertension presents with difficulty speaking, right-sided weakness, blurred vision.  Noticed difficulty speaking on the phone today, called EMS.  Patient also reports blurred vision in both eyes.  He looks at his right hand as if to describe weakness, though he gives up, unable to describe        Problem List  Principal Problem:    Cerebrovascular accident (CVA), unspecified mechanism (HCC)  Active Problems:    Acute right-sided weakness    Dysarthria    NSTEMI (non-ST elevated myocardial infarction) ([facility])      HOME SITUATION  Type of Home: House  Home Layout: Two level, Able to live on main level (has laundry in the basement; all living space on main level)  Stairs to Enter : 1   Railing: Yes    Stairs to Bedroom: 0         Lives With: Alone        Patient Regularly Uses: Glasses, Cane     Prior Level of [place]: Patient lives in a home alone. He reports doing all the home mgmt as well as goes to the basement to do his laundry. Family checks in daily per pt. He uses a cane for mobility and ADL.    SUBJECTIVE  \"I still can't see right\" blunted stuttering speech with increased time    PHYSICAL THERAPY EXAMINATION   OBJECTIVE  Precautions: HOB elevated 30 degrees, Cardiac, Limb alert - right, Limb alert - left, Bed/chair alarm, Hard of hearing  Fall Risk: High fall risk    PAIN ASSESSMENT  Rating: Other (Comment)  Location: no complaints  Management Techniques: Activity promotion    COGNITION  Overall Cognitive Status:  A&Ov4, slow to respond, word finding issues    RANGE OF MOTION AND STRENGTH ASSESSMENT  Upper extremity ROM and strength are  within functional limits except right hand restrictions and weakness    Lower extremity ROM is within functional limits   Lower extremity strength is within functional limits but weakness RLE compared to LLE    BALANCE  Static Sitting: Fair -  Dynamic Sitting: Poor +  Static Standing: Poor +  Dynamic Standing: Poor -    ADDITIONAL TESTS      Difficulty tracking and seeing to the left. Blurry vision R/L even with one eye covered to see if it improves. He is able to track in right sided visual fields, limited left side laterally           NEUROLOGICAL FINDINGS  Coordination - Finger to Nose: Left decreased speed, Right decreased speed, Right decreased accuracy     Coordination - Rapid Alternating Movement: Left decreased speed, Right decreased speed, Right decreased accuracy             ACTIVITY TOLERANCE  Pulse: 101 (a-fib/irregular)  Heart Rate Source: Monitor     BP: (!) 131/95  BP Location: Right arm  BP Method: Automatic  Patient Position: Lying    O2 WALK  Oxygen Therapy  SPO2% on Room Air at Rest: 97    AM-PAC '6-Clicks' INPATIENT SHORT FORM - BASIC MOBILITY  How much difficulty does the patient currently have...  Patient Difficulty: Turning over in bed (including adjusting bedclothes, sheets and blankets)?: A Little   Patient Difficulty: Sitting down on and standing up from a chair with arms (e.g., wheelchair, bedside commode, etc.): A Lot   Patient Difficulty: Moving from lying on back to sitting on the side of the bed?: A Lot   How much help from another person does the patient currently need...   Help from Another: Moving to and from a bed to a chair (including a wheelchair)?: A Lot   Help from Another: Need to walk in hospital room?: A Lot   Help from Another: Climbing 3-5 steps with a railing?: Total     AM-PAC Score:  Raw Score: 12   Approx Degree of Impairment: 68.66%   Standardized Score (AM-PAC Scale): 35.33   CMS Modifier (G-Code): CL    FUNCTIONAL ABILITY STATUS  Functional Mobility/Gait  Assessment  Gait Assistance: Moderate assistance  Distance (ft): 5  Assistive Device: Rolling walker  Pattern: Shuffle, Ataxic  Rolling: supervision  Supine to Sit: moderate assist  Sit to Supine: moderate assist    Sit to Stand: moderate assist with increased time to process how to stand and initial posterior lean    Exercise/Education Provided:  Bed mobility  Energy conservation  Functional activity tolerated  Gait training  Neuromuscular re-educate  Posture  ROM  Strengthening  Transfer training    Skilled Therapy Provided: RN approves participation in therapy session. Patient presents resting in bed and agreeable to work with therapy> He has ongoing word finding issues and stuttering speech, responds well to yes/no questions. He is awake and alert and participates fully in session. He has noted dysmetria and ataxia, impaired coordination BUE/BLE, difficulty/unable to don his own glasses safely. He is needing overall mod-max A but moves with mod-min A once standing. He is limited by visual field deficits, coordination and motor planning. He is able to walk with RW and cues for navigation, assist to manage RW at times due to vision/obstacles. He is up in chair with alarm set and all needs in reach. RN Munir aware.    The patient's Approx Degree of Impairment: 68.66% has been calculated based on documentation in the [facility] '6 clicks' Inpatient Basic Mobility Short Form.  Research supports that patients with this level of impairment may benefit from inpatient rehab and ACUTE rehab is the recommendation given patients very independent prior level as well as willingness to participate, motivation to return to \[facility]\"". Final disposition will be made by interdisciplinary medical team.    Patient End of Session: Up in chair, Needs met, Call light within reach, RN aware of session/findings, All patient questions and concerns addressed, Hospital anti-slip socks, Alarm set, Discussed recommendations with /social  worker    CURRENT GOALS  Goals to be met by: 4/28/25  Patient Goal Patient's self-stated goal is: to get back to independent level   Goal #1 Patient is able to demonstrate supine - sit EOB @ level: minimum assistance     Goal #1   Current Status    Goal #2 Patient is able to demonstrate transfers Sit to/from Stand at assistance level: minimum assistance with walker - rolling     Goal #2  Current Status    Goal #3 Patient is able to ambulate 25 feet with assist device: walker - rolling at assistance level: minimum assistance   Goal #3   Current Status    Goal #4 Patient will negotiate one curb w/ assistive device and maximal assistance   Goal #4   Current Status    Goal #5 Patient to demonstrate independence with home activity/exercise instructions provided to patient in preparation for discharge.   Goal #5   Current Status    Goal #6 Patient to navigate bed to/from chair transfers with RW with minimal assistance   Goal #6  Current Status      Patient Evaluation Complexity Level:  History High - 3 or more personal factors and/or co-morbidities   Examination of body systems Moderate - addressing a total of 3 or more elements   Clinical Presentation  Moderate - Evolving   Clinical Decision Making  Moderate Complexity     Therapeutic Activity:  33 minutes

## 2025-04-14 NOTE — OCCUPATIONAL THERAPY NOTE
OCCUPATIONAL THERAPY EVALUATION - INPATIENT     Room Number: 313/313-A  Evaluation Date: 4/14/2025  Type of Evaluation: Initial   Presenting problem: subacute left occipital cortical CVA   Co-morbidities: HTN, HLD and DM2     Physician Order: IP Consult to Occupational Therapy  Reason for Therapy: ADL/IADL Dysfunction and Discharge Planning    OCCUPATIONAL THERAPY ASSESSMENT   Patient is a 89 year old male admitted 4/13/2025 for subacute left occipital cortical CVA.  Prior to admission, patient's baseline is independent with ADLs and mod I with cane.  Patient is currently functioning below baseline with bed mobility, functional transfer, mobility, and ADL tasks.  Patient is requiring  max assist for ADLs, mod assist for bed mobility, and mod assist for functional transfers/mobility  as a result of the following impairments: decreased functional strength, decreased functional reach, impaired standing balance, impaired coordination, impaired motor planning, and medical status. Occupational Therapy will continue to follow for duration of hospitalization.    Patient will benefit from continued skilled OT Services to facilitate return to prior level of function as patient demonstrates high motivation with excellent tolerance to an intensive therapy program.    PLAN DURING HOSPITALIZATION  OT Device Recommendations: None  OT Treatment Plan: Energy conservation/work simplification techniques, Balance activities, ADL training, Functional transfer training, Endurance training, Patient/Family education, Patient/Family training, Compensatory technique education     OCCUPATIONAL THERAPY MEDICAL/SOCIAL HISTORY   Problem List  Principal Problem:    Occipital stroke (HCC)  Active Problems:    NSTEMI (non-ST elevated myocardial infarction) ([facility])    HOME SITUATION  Type of Home: House  Home Layout: Two level; Able to live on main level (has laundry in the basement; all living space on main level)  Lives With: Alone  Toilet and  Equipment: Standard height toilet  Shower/Tub and Equipment: Tub-shower combo; Shower chair  Patient Regularly Uses: Glasses; Cane    Prior Level of [place]: Prior to admission pt lived alone and was independent with ADLs. Pt was mod I for mobility using cane.     SUBJECTIVE  \"My eyes are still blurry.\"     OCCUPATIONAL THERAPY EXAMINATION      OBJECTIVE  Precautions: HOB elevated 30 degrees; Cardiac; Limb alert - right; Limb alert - left; Bed/chair alarm; Hard of hearing  Fall Risk: High fall risk      PAIN ASSESSMENT  Ratin      ACTIVITY TOLERANCE  Pulse: 101  Heart Rate Source: Monitor     BP: (!) 131/95  BP Location: Left arm  BP Method: Automatic  Patient Position: Sitting    O2 SATURATIONS  Oxygen Therapy  SPO2% on Room Air at Rest: 96    COGNITION  Following Commands:  follows one step commands with increased time and follows one step commands with repetition  Initiation: hand over hand to initiate tasks  Motor Planning: impaired    VISION  Pt reports continued blurry vision in both eyes. Pt not able to read labels on everyday items in room (wipes, Kleenex, etc.)     Communication: Pt with slow speech throughout session. Required increased time to respond to questions verbally.     RANGE OF MOTION   BUE shoulder, elbow, and wrist ROM WFL   -Pt demo impaired ROM in the digits of the RUE; slightly flexed at DIP and PIP joints; could not come to full extension with AROM/AAROM.     STRENGTH ASSESSMENT  LUE: WFL   RUE: grossly 4/5     COORDINATION  Gross Motor: Impaired   Fine Motor: Impaired     Tests completed:   -FNF, eyes occluded: Impaired speed/accuracy with RUE  -BLANCA (rapid alternating movements): Impaired speed/accuracy with RUE  -finger opposition: Impaired speed/accuracy with RUE      ACTIVITIES OF DAILY LIVING ASSESSMENT  AM-PAC ‘6-Clicks’ Inpatient Daily Activity Short Form  How much help from another person does the patient currently need…  -   Putting on and taking off regular lower body  clothing?: A Lot  -   Bathing (including washing, rinsing, drying)?: A Lot  -   Toileting, which includes using toilet, bedpan or urinal? : A Lot  -   Putting on and taking off regular upper body clothing?: A Little  -   Taking care of personal grooming such as brushing teeth?: A Little  -   Eating meals?: A Little    AM-PAC Score:  Score: 15  Approx Degree of Impairment: 56.46%  Standardized Score (AM-PAC Scale): 34.69  CMS Modifier (G-Code): CK    FUNCTIONAL TRANSFER ASSESSMENT  Sit to Stand: Edge of Bed; Chair  Edge of Bed: Moderate Assist  Chair: Moderate Assist  Simulated commode transfer: Mod assist at RW level     BED MOBILITY  Supine to Sit : Moderate Assist    BALANCE ASSESSMENT  Static Sitting: Stand-by Assist  Static Standing: Contact Guard Assist  Dynamic sitting: CGA  Dynamic Standing: min assist      FUNCTIONAL ADL ASSESSMENT  Grooming Seated: Minimal Assist  LB Dressing Seated: Maximum Assist    Skilled Therapy Provided:   RN cleared pt for participation. Session coordinated with PT. Pt received in bed with no visitors present. Pt agreeable to participation in therapy; required increased time to respond to questions/express needs throughout due to difficulty with word finding. Pt required mod assist to complete supine to sit transition; required hand-over-hand assist for placement of BUE during transition and physical assist to manage BLE. Pt able to maintain static/dynamic sitting EOB with SBA-CGA; required min assist to complete seated grooming tasks due to impaired RUE coordination. Pt required max assist to don anti-slip socks from bed level. Pt required min assist to complete STS from bed and simulated commode transfer at RW level; benefited from cues for sequencing and assist with RW management. Pt demo DIP and PIP joints of R hand in contracted position; unable to reach full extension with AROM/AAROM. Pt reports this developed recently. Pt remained in recliner with all needs in reach and alarm  on. RN aware.      EDUCATION PROVIDED  Patient Education : Role of Occupational Therapy; Plan of Care; Functional Transfer Techniques; Fall Prevention; Posture/Positioning; Energy Conservation; Proper Body Mechanics  Patient's Response to Education: Verbalized Understanding; Returned Demonstration    The patient's Approx Degree of Impairment: 56.46% has been calculated based on documentation in the [facility] '6 clicks' Inpatient Daily Activity Short Form.  Research supports that patients with this level of impairment may benefit from rehab.  Final disposition will be made by interdisciplinary medical team.     Patient End of Session: Up in chair, Needs met, Call light within reach, RN aware of session/findings, All patient questions and concerns addressed, Hospital anti-slip socks, Alarm set    OT Goals  Patients self stated goal is: none stated      Patient will complete functional transfer with SUP  Comment:     Patient will complete toileting with SUP  Comment:     Patient will tolerate standing for 5 minutes in prep for adls with SUP   Comment:    Patient will complete item retrieval with SUP  Comment:          Goals  on: 25  Frequency: 3-5x/week    Patient Evaluation Complexity Level:   Occupational Profile/Medical History MODERATE - Expanded review of history including review of medical or therapy record   Specific performance deficits impacting engagement in ADL/IADL MODERATE  3 - 5 performance deficits   Client Assessment/Performance Deficits MODERATE - Comorbidities and min to mod modifications of tasks    Clinical Decision Making MODERATE - Analysis of occupational profile, detailed assessments, several treatment options    Overall Complexity MODERATE     OT Session Time: 23 minutes  Self-Care Home Management: 23 minutes

## 2025-04-14 NOTE — PROGRESS NOTES
[facility]  part of [facility]    Progress Note    Sarwat Townsend Patient Status:  Inpatient    1932 MRN [id number]   Location [facility] 3W/SW Attending Mirlande Amaya,    Hosp Day # 1 PCP Kamlesh Durant MD     Chief complaint cva     Subjective:   Sarwat Townsend is a(n) 89 year old male pt doing better this am. Feels overall weak. Was living at home alone. Nieces and nephews were helping.     ROS:   No cp, sob   No c/d   No n/v     Objective:   Blood pressure 103/54, pulse 100, temperature 97.5 °F (36.4 °C), temperature source Oral, resp. rate 16, weight 100 lb 6.4 oz (45.5 kg), SpO2 97%.      Intake/Output Summary (Last 24 hours) at 2025 1316  Last data filed at 2025 0611  Gross per 24 hour   Intake 10 ml   Output 400 ml   Net -390 ml       Patient Weight(s) for the past 336 hrs:   Weight   25 0533 100 lb 6.4 oz (45.5 kg)   25 1811 102 lb (46.3 kg)           General appearance: alert, appears stated age and cooperative  Pulmonary:  clear to auscultation bilaterally  Cardiovascular: S1, S2 normal, no murmur, click, rub or gallop, regular rate and rhythm  Abdominal: soft, non-tender; bowel sounds normal; no masses,  no organomegaly  Extremities: extremities normal, atraumatic, no cyanosis or edema        Medicines:   Current Hospital Medications[1]        Ant-Infective Medications            nitrofurantoin monohydrate macro 100 MG Oral Cap ()            Blood Sugar Medications            metFORMIN 850 MG Oral Tab            Blood Pressure and Cardiac Medications            amLODIPine 10 MG Oral Tab            Medication Infusions[2]        Lab Results   Component Value Date    WBC 7.7 2025    HGB 9.6 (L) 2025    HCT 28.7 (L) 2025    .0 2025    CREATSERUM 0.68 (L) 2025    BUN 22 2025     2025    K 3.6 2025     2025    CO2 21.0 2025     (H) 2025    CA 8.7  04/14/2025    ALB 4.0 04/13/2025    ALKPHO 223 (H) 04/13/2025    BILT 0.7 04/13/2025    TP 7.1 04/13/2025    AST 57 (H) 04/13/2025    ALT 52 (H) 04/13/2025    TSH 1.264 04/14/2025    MG 1.9 04/14/2025       CTA BRAIN + CTA CAROTIDS (CPT=70496/81880)  Result Date: 4/13/2025  CONCLUSION:  1. No major vessel occlusion, hemodynamically significant stenosis, dissection, AVM or aneurysm.    Dictated by (CST): Rocael Bruner MD on 4/13/2025 at 6:45 PM     Finalized by (CST): Rocael Bruner MD on 4/13/2025 at 6:57 PM          CT BRAIN OR HEAD (CPT=70450)  Result Date: 4/13/2025  CONCLUSION:  1. Subacute/recent left occipital cortical infarct. 2. Small probably chronic right occipital cortical infarct. 3. Old left cerebellar infarct. 4. Changes of chronic small vessel disease in cerebral white matter. 5. Large vessel intracranial atherosclerosis.    Dictated by (CST): Rocael Bruner MD on 4/13/2025 at 3:37 PM     Finalized by (CST): Rocael Bruner MD on 4/13/2025 at 3:42 PM          EKG 12 Lead  Result Date: 4/14/2025  Atrial fibrillation Right bundle branch block Abnormal ECG When compared with ECG of 13-APR-2025 15:05, Atrial fibrillation has replaced Sinus rhythm Confirmed by MARIA M WRIGHT, PARUL ([id number]) on 4/14/2025 11:02:51 AM    EKG 12 Lead  Result Date: 4/13/2025  Normal sinus rhythm Right atrial enlargement Right bundle branch block Abnormal ECG No previous ECGs found in [facility]      Results:     CBC:    Lab Results   Component Value Date    WBC 7.7 04/14/2025    WBC 10.4 04/13/2025    WBC 7.1 02/21/2025     Lab Results   Component Value Date    HGB 9.6 (L) 04/14/2025    HGB 10.6 (L) 04/13/2025    HGB 11.2 (L) 02/21/2025      Lab Results   Component Value Date    .0 04/14/2025    .0 04/13/2025    .0 02/21/2025       Recent Labs   Lab 04/13/25  1504 04/14/25  0651   * 117*   BUN 28* 22   CREATSERUM 1.00 0.68*   CA 9.1 8.7   * 137   K 4.1 3.6   CL 97* 102   CO2 26.0 21.0           Assessment  and Plan:       Sarwat Townsend is a 89 year old male with a history of HTN, HLD and DM2, who presents with right-sided weakness, blurry vision, slurred speech and shortness of breath for 2 days.      # Subacute ischemic stroke  - CT head showed subacute left occipital cortical infarct, small chronic right occipital cortical infarct, and old left cerebellar infarct. MRI pending   - apprec neuro consult. Hold heparin for 2 weeks for now   - Aspirin 324 mg given, continue aspirin 81 mg daily and atorvastatin 40 mg   - Patient failed bedside speech eval  - PT, OT, SLP  - TSH, A1c, LDL - 8.7 A1c, ldl 55, tsh normal      # NSTEMI  - troponin 3741, no acute ischemic changes on ECG.   - apprec cards consult   - echo pending      # Hypertension  - soft BP, hold home amlodipine     # DM2 - on metformin 850 bid at home   - SSI Q6H  change to ac and hs   - A1c 8.7   - diet education      # Hyponatremia  - Na 133  - NS infusion  - BMP in AM    Malnutrion - dietician consulted      Diet: NPO  PT/OT: consulted  DVT ppx: heparin  Line: none  Code status: Full  Dispo: expect greater than 2 midnights     MDM: high           IVF:  Diet:   PT/OT:   DVT ppx:  Code status:   Dispo:           Mirlande Amaya DO         Chart reviewed, including current vitals, notes, labs and imaging  Labs ordered and medications adjusted as outlined above  Coordinate care with care team/consultants  Discussed with patient results of tests, management plan as outlined above, and the need for ongoing hospitalization  D/w RN     [facility] high        4/14/2025             Supplementary Documentation:   DVT Mechanical Prophylaxis:   SCDs,    DVT Pharmacologic Prophylaxis   Medication    heparin (Porcine) 5000 UNIT/ML injection 5,000 Units      DVT Pharmacologic prophylaxis: Aspirin 162 mg         Code Status: Not on file  Raines: External urinary catheter in place  Raines Duration (in days):   Central line:    EDWIN: 4/15/2025              Dietitian Malnutrition  Assessment    Evaluation for Malnutrition: Criteria for severe malnutrition diagnosis- social/environmental related to     Wt loss greater than 20% in 1 year., Body fat severe depletion., Muscle mass severe depletion.             RD Malnutrition Care Plan: Adjusted diet to least restrictive to maximize intake., Encouraged increased PO intake., Intiated ONS (oral nutritional supplements).    Body Fat/Muscle Mass: Severe depletion body fat., Severe depletion muscle mass. triceps region., fat overlying rib cage region. clavicle region., scapular region., shoulder region., dorsal thomas region., thigh region.    Physician Assessment       Patient has a diagnosis of severe malnutrition             [1]   Current Facility-Administered Medications   Medication Dose Route Frequency    potassium chloride 40 mEq in 250mL sodium chloride 0.9% IVPB premix  40 mEq Intravenous Once    sodium chloride 0.9% infusion   Intravenous Continuous    acetaminophen (Tylenol) tab 650 mg  650 mg Oral Q4H PRN    Or    acetaminophen (Tylenol) rectal suppository 650 mg  650 mg Rectal Q4H PRN    ondansetron (Zofran) 4 MG/2ML injection 4 mg  4 mg Intravenous Q6H PRN    metoclopramide (Reglan) 5 mg/mL injection 5 mg  5 mg Intravenous Q8H PRN    heparin (Porcine) 5000 UNIT/ML injection 5,000 Units  5,000 Units Subcutaneous Q8H YOANDY    acetaminophen (Tylenol Extra Strength) tab 500 mg  500 mg Oral Q4H PRN    melatonin tab 3 mg  3 mg Oral Nightly PRN    aspirin chewable tab 81 mg  81 mg Oral Daily    polyethylene glycol (PEG 3350) (Miralax) 17 g oral packet 17 g  17 g Oral Daily PRN    sennosides (Senokot) tab 17.2 mg  17.2 mg Oral Nightly PRN    bisacodyl (Dulcolax) 10 MG rectal suppository 10 mg  10 mg Rectal Daily PRN    fleet enema (Fleet) rectal enema 133 mL  1 enema Rectal Once PRN    guaiFENesin (Robitussin) 100 MG/5 ML oral liquid 200 mg  200 mg Oral Q4H PRN    glucose (Dex4) 15 GM/59ML oral liquid 15 g  15 g Oral Q15 Min PRN    Or    glucose  (Glutose) 40% oral gel 15 g  15 g Oral Q15 Min PRN    Or    glucose-vitamin C (Dex-4) chewable tab 4 tablet  4 tablet Oral Q15 Min PRN    Or    dextrose 50% injection 50 mL  50 mL Intravenous Q15 Min PRN    Or    glucose (Dex4) 15 GM/59ML oral liquid 30 g  30 g Oral Q15 Min PRN    Or    glucose (Glutose) 40% oral gel 30 g  30 g Oral Q15 Min PRN    Or    glucose-vitamin C (Dex-4) chewable tab 8 tablet  8 tablet Oral Q15 Min PRN    insulin regular human (Novolin R, Humulin R) 100 UNIT/ML injection 1-7 Units  1-7 Units Subcutaneous 4 times per day    atorvastatin (Lipitor) tab 40 mg  40 mg Oral Nightly   [2]    sodium chloride 75 mL/hr at 04/14/25 0805

## 2025-04-14 NOTE — DIETARY NOTE
ADULT NUTRITION INITIAL ASSESSMENT    Pt is at high nutrition risk.  Pt meets severe malnutrition criteria.      CRITERIA FOR MALNUTRITION DIAGNOSIS:  Criteria for severe malnutrition diagnosis: social/environmental related to wt loss greater than 20% in 1 year, body fat severe depletion, and muscle mass severe depletion.    RECOMMENDATIONS TO MD:  RD added Oral Nutritional Supplements per discussion with patient d/t Malnutrition + Liberalized Oral Diet from Cardiac to No Added Salt to better accommodate food preferences/encourage increased oral intake.     ADMITTING DIAGNOSIS:  Acute right-sided weakness [R53.1]  NSTEMI (non-ST elevated myocardial infarction) (HCC) [I21.4]  Dysarthria [R47.1]  Cerebrovascular accident (CVA), unspecified mechanism (HCC) [I63.9]  PERTINENT PAST MEDICAL HISTORY: Past Medical History[1]    PATIENT STATUS: Initial 04/14/25: Pt admit for CVA. PMH sig for See Above. Pt assessed due to screening at risk for Low BMI.   Met with patient this am. States \"I'm starving\" \"I'd die for a cup of coffee.\"   Reports drinking 5 cups of coffee daily (2-1-2 cups). Reports eating 3 times daily. Usually has eggs or waffles for breakfast, lunch and dinner meals are typically \"TV dinners\" per patient. Likes barbeque pork + [employer] steak. States he has only been eating 1/2 of frozen meals d/t not having much of an appetite.   States his niece (Michelle) takes him shopping on Friday and out to eat occasionally.   States his (R) hand is difficult to move and his fingers are numb. RN aware and will assist as needed at meals.   Discussed importance of adequate nutrition with patient and he is willing to trial ONS (chocolate flavor). RD to add a variety of options between meals. Discussed w/RN.     FOOD/NUTRITION RELATED HISTORY:  Appetite: Decreased  Intake:  Monitor po intake. RD ordered food preferences for (B) this am: cheese omelette with tomatoes and mushrooms + 2 slices of toast with margarine/jelly + decaf  coffee. Patient reports he will try to eat half of the omelette. Encouraged po intake.   Intake Meeting Needs:  Monitor. Added ONS between meals to maximize.   Percent Meals Eaten (last 3 days)       None           Food Allergies: No Known Food Allergies (NKFA)  Cultural/Ethnic/Yarsani Preferences: None    GASTROINTESTINAL: +BM 4/12.  Reviewed Speech Therapist documentation. Diet Ordered.     MEDICATIONS: reviewed  Scheduled Medications[2]  LABS: reviewed - A1c: 8.7%   Recent Labs     04/13/25  1504 04/14/25  0651 04/14/25  0732   * 117*  --    BUN 28* 22  --    CREATSERUM 1.00 0.68*  --    CA 9.1 8.7  --    MG  --   --  1.9   * 137  --    K 4.1 3.6  --    CL 97* 102  --    CO2 26.0 21.0  --    OSMOCALC 289 288  --      NUTRITION RELATED PHYSICAL FINDINGS:  - Nutrition Focused Physical Exam (NFPE): severe depletion body fat triceps region and fat overlying rib cage region and severe depletion muscle mass clavicle region, scapular region, shoulder region, dorsal thomas region, and thigh region   - Fluid Accumulation:  Bilateral LE/Feet +1 Edema  see RN documentation for details  - Skin Integrity: at risk see RN documentation for details    ANTHROPOMETRICS:  HT:  5'6\"   WT: 45.5 kg (100 lb 6.4 oz)   BMI: Body mass index is 16.2 kg/m².  BMI CLASSIFICATION: <22 considered underweight for advanced age (>65)  IBW: 142 lbs        70% IBW  Usual Body Wt: 116 lbs 8/20/24      86% UBW    Patient reports 25# wt loss x 1 year (3/7/24: 126#) = 79% UBW    WEIGHT HISTORY:  Patient Weight(s) for the past 336 hrs:   Weight   04/14/25 0533 45.5 kg (100 lb 6.4 oz)   04/13/25 1811 46.3 kg (102 lb)     Wt Readings from Last 10 Encounters:   04/14/25 45.5 kg (100 lb 6.4 oz)   03/24/25 50.8 kg (112 lb)   02/21/25 50.8 kg (112 lb)   08/20/24 52.6 kg (116 lb)   03/07/24 57.2 kg (126 lb)   02/16/24 56.7 kg (125 lb)   02/06/23 58.5 kg (129 lb)   05/25/22 59.9 kg (132 lb)   04/25/22 63.3 kg (139 lb 9.6 oz)   04/18/22 61.2 kg  (135 lb)     NUTRITION DIAGNOSIS/PROBLEM:     Severe Malnutrition r/t decline in appetite/oral intake \"I have no appetite\" + advanced age + lives alone as evidenced by weight loss, severe muscle mass/body fat deficits.     NUTRITION INTERVENTION:     NUTRITION PRESCRIPTION:   Estimated Nutrition needs: --dosing wt of 45 kg - wt taken on 4/14  Calories: 1575+ calories/day (35+ calories per kg Dosing wt)  Protein: 68 g protein/day (1.5 g protein/kg Dosing wt)  Fluid Needs: per MD     - Diet:       Procedures    Cardiac diet Cardiac; Fluid Consistency: Thin Liquids ; Texture Consistency: Regular; Is Patient on Accuchecks? Yes; Is Patient on Suicide Precautions? No    RD changed Diet to No Added Salt per discussion with MD.   Liberalize Diet further prn.  - RD Malnutrition Care Plan: Adjusted diet to least restrictive to maximize intake, Encouraged increased PO intake, and Initiated ONS (oral nutritional supplements)  - Medical Food Supplements-RD added Ensure Enlive (350 calories/ 20 g protein each) Daily Chocolate, Ensure Max (150 calories and 30 g protein each) Daily Chocolate, and Magic Cup (290 calories/ 9 g protein each) Daily Chocolate. Rational/use of oral supplements discussed.  - Vitamin and mineral supplements: none  - Feeding assistance: meal set up  - Nutrition education: Discussed importance of adequate energy and protein intake with patient. Willing to trial ONS between meals. Adjust per preference.     - Coordination of nutrition care: collaboration with other providers  - Discharge and transfer of nutrition care to new setting or provider: monitor plans    MONITOR AND EVALUATE/NUTRITION GOALS:  - Food and Nutrient Intake:      Monitor: adequacy of PO intake, adequacy of supplement intake, and tolerance of supplement intake  - Food and Nutrient Administration:      Monitor: N/A  - Anthropometric Measurement:    Monitor weight  - Nutrition Goals:      halt wt loss, PO and supplement greater than 75% of  needs, and minimize lean body mass loss    DIETITIAN FOLLOW UP: RD to follow and monitor nutrition status    Yanet Nath RDN, LDN, CDE   Clinical Nutrition  Ext [phone]         [1]   Past Medical History:   Arthritis    Essential hypertension    Hyperlipidemia    Prediabetes   [2]    potassium chloride  40 mEq Intravenous Once    heparin  5,000 Units Subcutaneous Q8H YOANDY    aspirin  81 mg Oral Daily    insulin regular human  1-7 Units Subcutaneous 4 times per day    atorvastatin  40 mg Oral Nightly

## 2025-04-14 NOTE — PROGRESS NOTES
[facility] Cardiology Progress Note    Sarwat Townsend Patient Status:  Inpatient    1932 MRN [id number]   Location [facility] 3W/SW Attending Mirlande Amaya, DO   Hosp Day # 1 PCP Kamlesh Durant MD     Subjective:  Denies cp, sob     Objective:  /69 (BP Location: Right arm)   Pulse 81   Temp 98.3 °F (36.8 °C) (Oral)   Resp 18   Wt 100 lb 6.4 oz (45.5 kg)   SpO2 95%   BMI 16.20 kg/m²     Telemetry: Afib, 81 bpm       Intake/Output:    Intake/Output Summary (Last 24 hours) at 2025 0922  Last data filed at 2025 0611  Gross per 24 hour   Intake 10 ml   Output 400 ml   Net -390 ml       Last 3 Weights   25 0533 100 lb 6.4 oz (45.5 kg)   25 1811 102 lb (46.3 kg)   25 1049 112 lb (50.8 kg)   25 1107 112 lb (50.8 kg)       Labs:  Recent Labs   Lab 25  1504 25  0651   * 117*   BUN 28* 22   CREATSERUM 1.00 0.68*   EGFRCR 71 87   CA 9.1 8.7   * 137   K 4.1 3.6   CL 97* 102   CO2 26.0 21.0     Recent Labs   Lab 25  1503 25  0651   RBC 3.32* 3.08*   HGB 10.6* 9.6*   HCT 30.2* 28.7*   MCV 91.0 93.2   MCH 31.9 31.2   MCHC 35.1 33.4   RDW 13.6 13.6   NEPRELIM 8.23*  --    WBC 10.4 7.7   .0 181.0         Recent Labs   Lab 25  1504 25  0651 25  0732   TROPHS 3,741* 4,387* 4,377*       Diagnostics:         Review of Systems   Respiratory: Negative.     Cardiovascular: Negative.      Physical Exam:    General: Alert and oriented x 3. No apparent distress.   HEENT: Normocephalic, anicteric sclera, neck supple, no thyromegaly or adenopathy.  Neck: No JVD, carotids 2+, no bruits.  Cardiac: Irregularly irregular rate & rhythm. S1, S2 normal. No murmur, pericardial rub, S3, or extra cardiac sounds.  Lungs: Clear without wheezes, rales, rhonchi or dullness.  Normal excursions and effort.  Abdomen: Soft, non-tender. No organosplenomegally, mass or rebound, BS-present.  Extremities: Without clubbing or cyanosis.   No left lower extremity edema, no right lower extremity edema.  Neurologic: Alert and oriented. Right sided weakness, expressive aphasia  Skin: Warm and dry.       Medications:  Scheduled Medications[1]  Medication Infusions[2]    Assessment:    Acute CVA   - presented with aphasia, right sided weakness & head CT w/ subacute L occipital cortical infarct along with prior cerebellar infarcts and chronic small vessel changes with large vessel atherosclerosis  - on asa & atorvastatin   - Neurology consulted     Elevated troponin (suspect type 2 demand)   - peak TnI 4387, now w/ slight downtrend   - no acute angina, EKG w/ no acute ischemic changes. No heparin at this time   - suspect secondary to acute neurological event    - echocardiogram results pending   - continue medical management w/ consideration for eventual ischemic evaluation   - on asa & atorvastatin     Atrial fibrillation, new onset   - converted to new afib 4/14; rate controlled   - [id number] = 6 (age, htn, cva, dm)     HTN   - low normal, home amlodipine on hold      HLD  - risk factor modification, on atorvastatin (& zetia at home)    T2DM   - On insulin per PMD     Plan:    Peak TnI 4387, now with slight down trend. Denies anginal sx.   Echocardiogram results pending. Recommend eventual ischemic evaluation   New onset atrial fibrillation noted. HR controlled. Consider beta blockade as BP permits. Replenish electrolytes per cardiac protocol. Elevated [id number]. Start heparin gtt if ok w/ Neuro   Continue asa, atorvastatin     MEAGAN Abrams  4/14/2025  7:30 AM  Ph [phone] ([place])  Ph [phone] ([place])      ===============================================  Seen/examined patient independently.  Agree with findings, assessment and plan as outlined above.   Pt denies any active complaints. No CP or SOB. Denies any palpitations  New onset AF per ECG and tele, relatively rate controlled.   In regards to NSTEMI, high sensitivity troponin  [facility] Pt is without any active CP or anginal equivalent symptoms. Will further risk stratify with TTE and pending goals of care discussion and neuro discussion with recent CVA, a plan for ischemic evaluation.   In regards to new onset AF, pt with significantly elevated cardioembolic risks however does have elevated bleeding risks due to age, frailty and fall risk; in the interim would start AC if ok per neuro and will need discussion with family and patient for long-term.   I have personally performed the medical decision making in its entirety.   Gerald Venegas DO           [1]    potassium chloride  40 mEq Intravenous Once    heparin  5,000 Units Subcutaneous Q8H YOANDY    aspirin  81 mg Oral Daily    insulin regular human  1-7 Units Subcutaneous 4 times per day    atorvastatin  40 mg Oral Nightly   [2]    sodium chloride 75 mL/hr at 04/14/25 0805

## 2025-04-14 NOTE — SLP NOTE
ADULT SWALLOWING EVALUATION    ASSESSMENT    ASSESSMENT/OVERALL IMPRESSION:  PPE REQUIRED. THIS THERAPIST WORE GLOVES FOR DURATION OF EVALUATION. HANDS WASHED UPON ENTRANCE/EXIT.    Per MD H&P, \"Sarwat Townsend is a 89 year old male with a history of HTN, HLD and DM2, who presents with right-sided weakness, blurry vision, slurred speech and shortness of breath for 2 days. Patient stated he developed right-sided weakness, blurry vision, slurred speech and shortness of breath about 2 days ago, but did not seek medical attention. When his family called him today, he was noticed to have speech problem. EMS was then called, and the patient was brought to hospital.\"    SLP BSSE orders received and acknowledged. A swallow evaluation warranted per stroke protocol/RN dysphagia screen. Pt afebrile with clear vocal quality, on room air, with oxygen saturation at 95%. Pt with no hx of dysphagia at [facility].   Pt positioned 90 degrees in bedside chair, alert/cooperative. Pt with no complaints of pain. Oral motor skills within functional limits. Pt presented with trials of hard solids and thin liquids via cup. Pt with adequate oral acceptance and bilabial seal across all trials. Pt with intact bite, prolonged mastication of solids, and delayed A-P transit. Pt's swallow response appears delayed with reduced hyolaryngeal elevation/excursion. No clinical signs of aspiration (e.g., immediate/delayed throat clear, immediate/delayed cough, wet vocal quality, increased O2 effort) observed across all trials. No CXR on this admission. Oxygen status remained stable t/o the entire evaluation.     At this time, pt presents with mild oral dysphagia and probable pharyngeal dysfunction. Recommend a regular diet and thin liquids with strict adherence to safe swallowing compensatory strategies. Results and recommendations reviewed with RN, pt. Pt v/u to all results/recommendations. Recommendations remain written on whiteboard. SLP collaborated with RN  for MD diet orders.     PLAN: SLP to f/u x1-2 meal assessments, monitor imaging, and VFSS if clinically indicated       RECOMMENDATIONS   Diet Recommendations - Solids: Regular  Diet Recommendations - Liquids: Thin Liquids                       Aspiration Precautions: Upright position, Slow rate, Small bites, Small sips, No straw  Medication Administration Recommendations:  (as tolerated)  Treatment Plan/Recommendations: Aspiration precautions, Communication evaluation    HISTORY   MEDICAL HISTORY  Reason for Referral: Stroke protocol, RN dysphagia screen    Problem List  Principal Problem:    Cerebrovascular accident (CVA), unspecified mechanism (HCC)  Active Problems:    Acute right-sided weakness    Dysarthria    NSTEMI (non-ST elevated myocardial infarction) (HCC)      Past Medical History  Past Medical History[1]    Prior Living Situation: Unknown  Diet Prior to Admission: Regular, Thin liquids  Precautions: Aspiration    Patient/Family Goals: did not state    SWALLOWING HISTORY  Current Diet Consistency: NPO  Dysphagia History: none  Imaging Results: 4/13 CT BRAIN  CONCLUSION:   1. Subacute/recent left occipital cortical infarct.   2. Small probably chronic right occipital cortical infarct.   3. Old left cerebellar infarct.   4. Changes of chronic small vessel disease in cerebral white matter.   5. Large vessel intracranial atherosclerosis.     SUBJECTIVE       OBJECTIVE   ORAL MOTOR EXAMINATION  Dentition: Functional  Symmetry: Within Functional Limits  Strength: Within Functional Limits     Range of Motion: Within Functional Limits  Rate of Motion: Reduced    Voice Quality: Clear  Respiratory Status: Unlabored  Consistencies Trialed: Thin liquids, Hard solid  Method of Presentation: Self presentation, Cup  Patient Positioned: Upright, Midline, Bedside chair    Oral Phase of Swallow: Impaired  Bolus Retrieval: Intact  Bilabial Seal: Intact  Bolus Formation: Impaired  Bolus Propulsion: Impaired  Mastication:  Impaired  Retention: Impaired    Pharyngeal Phase of Swallow: Appears Impaired  Laryngeal Elevation Timing: Appears impaired  Laryngeal Elevation Strength: Appears impaired  Laryngeal Elevation Coordination: Appears intact  (Please note: Silent aspiration cannot be evaluated clinically. Videofluoroscopic Swallow Study is required to rule-out silent aspiration.)    Esophageal Phase of Swallow: No complaints consistent with possible esophageal involvement  Comments: NA          GOALS  Goal #1 The patient will tolerate regular consistency and thin liquids without overt signs or symptoms of aspiration with 100 % accuracy over 1-2 session(s).  In Progress   Goal #2 The patient/family/caregiver will demonstrate understanding and implementation of aspiration precautions and swallow strategies independently over 1-2 session(s).    In Progress   Goal #3 The patient will utilize compensatory strategies as outlined by  BSSE (clinical evaluation) including Slow rate, Small bites, Small sips, No straws, Upright 90 degrees, Eliminate distractions with PRN assistance 100 % of the time across 1-2 sessions.  In Progress     FOLLOW UP  Treatment Plan/Recommendations: Aspiration precautions, Communication evaluation  Duration: 1 week  Follow Up Needed (Documentation Required): Yes  SLP Follow-up Date: 04/15/25    Thank you for your referral.   If you have any questions, please contact BUCK Robles M.S. CCC-SLP  Speech Language Pathologist  Phone Number Ext. [phone]         [1]   Past Medical History:   Arthritis    Essential hypertension    Hyperlipidemia    Prediabetes

## 2025-04-14 NOTE — PLAN OF CARE
Problem: ALTERED NUTRIENT INTAKE - ADULT  Goal: Nutrient intake appropriate for improving, restoring or maintaining nutritional needs  Description: INTERVENTIONS:- Assess nutritional status and recommend course of action- Monitor oral intake, labs, and treatment plans- Recommend appropriate diets, oral nutritional supplements, and vitamin/mineral supplements- Recommend, monitor, and adjust tube feedings and TPN/PPN based on assessed needs- Provide specific nutrition education as appropriate  Outcome: Progressing

## 2025-04-14 NOTE — PAYOR COMM NOTE
--------------  ADMISSION REVIEW     Payor: [employer]  Subscriber #:  [id number]  Authorization Number: [id number]    Admit date: 4/13/25  Admit time:  6:06 PM       ED Provider Notes        History   HPI  89-year-old male with history of hypertension presents with difficulty speaking, right-sided weakness, blurred vision.  Noticed difficulty speaking on the phone today, called EMS.  Patient also reports blurred vision in both eyes.  He looks at his right hand as if to describe weakness, though he gives up, unable to describe    ED Triage Vitals [04/13/25 1458]   /53   Pulse 95   Resp 26   Temp 97.8 °F (36.6 °C)   Temp src Oral   SpO2 99 %   O2 Device None (Room air)   HENT: Mucous membranes moist.   EYES: PERRL. EOMI.   NECK: Supple.   CARDIAC: Normal rate. Normal S1/ S2. 2+ distal pulses. No edema  PULM/CHEST: Clear to auscultation bilaterally. No wheezes  ABD: Soft, non-tender, non-distended  RECTAL: deferred  Extremities: Full ROM  NEURO: Alert. CN II-XII intact.  Weakness in right hip flexion compared to left.  Weakness in right hand  strength.  Sensation intact to light touch.  Difficulty with word finding  SKIN: Warm and dry. No rash or lesions.  PSYCH: Normal judgment. Normal affect.     Labs Reviewed   CBC WITH DIFFERENTIAL WITH PLATELET - Abnormal; Notable for the following components:       Result Value    RBC 3.32 (*)     HGB 10.6 (*)     HCT 30.2 (*)     Neutrophil Absolute Prelim 8.23 (*)     Neutrophil Absolute 8.23 (*)     Lymphocyte Absolute 0.86 (*)     Monocyte Absolute 1.11 (*)     All other components within normal limits   BASIC METABOLIC PANEL (8) - Abnormal; Notable for the following components:    Glucose 226 (*)     Sodium 133 (*)     Chloride 97 (*)     BUN 28 (*)     BUN/CREA Ratio 28.0 (*)     All other components within normal limits   HEPATIC FUNCTION PANEL (7) - Abnormal; Notable for the following components:    AST 57 (*)     ALT 52 (*)     Alkaline Phosphatase 223  (*)     All other components within normal limits   TROPONIN I HIGH SENSITIVITY - Abnormal; Notable for the following components:    Troponin I (High Sensitivity) 3,741 (*)     All other components within normal limits   POCT GLUCOSE - Abnormal; Notable for the following components:    POC Glucose  210 (*)    EKG    Rate, intervals and axes as noted on EKG Report.  Rate: 92  Rhythm: Sinus Rhythm  Reading: No ectopy, nonspecific ST and T wave changes  ED course  Patient arrived to ER vital signs normal.  He is having word finding difficulty, weakness in his right hip flexor as well as right  strength.  Otherwise visual fields seem intact despite his complaint of blurred vision.  Last known normal was roughly 48 hours ago, not a candidate for thrombolysis.  Do suspect he has had a stroke.  Will send for CT head, start labs.  Will likely need admission with neurology consult    Laboratory results above were independently viewed and interpreted as: Mild anemia, hyperglycemia, elevated troponin consistent with NSTEMI likely demand ischemia  Radiology: ordered and independently interpreted as: CT head negative for bleed, old strokes noted    Per radiology, subacute to recent left occipital cortical infarct.  Also with NSTEMI, likely demand ischemia.  Will give aspirin, consult cardiology, neurology, plan for admission    Medications   aspirin chewable tab 324 mg (has no administration in time range)   Admission disposition: 4/13/2025  3:59 PM    Disposition and Plan     Clinical Impression:  1. Cerebrovascular accident (CVA), unspecified mechanism (HCC)    2. Acute right-sided weakness    3. Dysarthria    4. NSTEMI (non-ST elevated myocardial infarction) (HCC)       Disposition:  Admit  4/13/2025  3:59 pm               History & Physical      HPI:   Sarwat Townsend is a 89 year old male with a history of HTN, HLD and DM2, who presents with right-sided weakness, blurry vision, slurred speech and shortness of breath for  2 days. Patient stated he developed right-sided weakness, blurry vision, slurred speech and shortness of breath about 2 days ago, but did not seek medical attention. When his family called him today, he was noticed to have speech problem. EMS was then called, and the patient was brought to hospital.     In ED, patient has normal vitals, no elevated blood pressure. Right-sided weakness and aphasia were noted. Stroke alert was called. Blood work showed troponin 3741. No acute ischemic changes on ECG. CT head showed subacute left occipital cortical infarct, small chronic right occipital cortical infarct, and old left cerebellar infarct. Aspirin 324 mg given. Neuro and Cards consulted.    Blood pressure 112/53, pulse 95, temperature 97.8 °F (36.6 °C), temperature source Oral, resp. rate 26, SpO2 99%.     SKIN:  Warm and hydrated  HEENT:  Head was atraumatic and normocephalic.  Eyes:  Extraocular muscles were intact.  Sclera was anicteric.  Pupils were equally reactive to light.  Ears:  There were no lesions.  Nose:  No lesions were noted.   NECK:  Supple.  There was no JVD.   CHEST:  Symmetrical movement on inspiration  HEART:  S1 and S2 heard.  RRR   LUNGS:  Air entry was good.  No crackles or wheezes   ABDOMEN: Soft and non-tender.  Bowel sounds were present.  MUSCULOSKELETAL:  There was no deformity.    EXTREMITIES: There was no edema, clubbing or cyanosis  NEUROLOGICAL:  right-sided weakness, slurred speech   PSYCHIATRIC: Calm and cooperative      Lab Results   Component Value Date     WBC 10.4 04/13/2025     HGB 10.6 (L) 04/13/2025     HCT 30.2 (L) 04/13/2025     .0 04/13/2025     CREATSERUM 1.00 04/13/2025     BUN 28 (H) 04/13/2025      (L) 04/13/2025     K 4.1 04/13/2025     CL 97 (L) 04/13/2025     CO2 26.0 04/13/2025      (H) 04/13/2025     CA 9.1 04/13/2025     ALB 4.0 04/13/2025     ALKPHO 223 (H) 04/13/2025     BILT 0.7 04/13/2025     TP 7.1 04/13/2025     AST 57 (H) 04/13/2025     ALT 52  (H) 04/13/2025      CT BRAIN OR HEAD (CPT=70450)  Result Date: 4/13/2025  CONCLUSION:  1. Subacute/recent left occipital cortical infarct. 2. Small probably chronic right occipital cortical infarct. 3. Old left cerebellar infarct. 4. Changes of chronic small vessel disease in cerebral white matter. 5. Large vessel intracranial atherosclerosis.    Dictated by (CST): Rocael Bruner MD on 4/13/2025 at 3:37 PM     Finalized by (CST): Rocael Bruner MD on 4/13/2025 at 3:42 PM           EKG 12 Lead  Result Date: 4/13/2025  Normal sinus rhythm Right atrial enlargement Right bundle branch block Abnormal ECG No previous ECGs found in [facility]      Assessment/Plan:   Sarwat Townsend is a 89 year old male with a history of HTN, HLD and DM2, who presents with right-sided weakness, blurry vision, slurred speech and shortness of breath for 2 days.      # Subacute ischemic stroke  - CT head showed subacute left occipital cortical infarct, small chronic right occipital cortical infarct, and old left cerebellar infarct.   - Aspirin 324 mg given, continue aspirin 81 mg daily and atorvastatin 40 mg   - Patient failed bedside speech eval  - PT, OT, SLP  - TSH, A1c, LDL  - Neuro consulted     # NSTEMI  - troponin 3741, no acute ischemic changes on ECG.   - trend troponin  - Tele  - Cards consulted     # Hypertension  - soft BP, hold home amlodipine     # DM2  - SSI Q6H     # Hyponatremia  - Na 133  - NS infusion  - BMP in AM     Diet: NPO  PT/OT: consulted  DVT ppx: heparin          4/14/25       Subjective:   Sarwat Townsend is a(n) 89 year old male pt doing better this am. Feels overall weak. Was living at home alone. Nieces and nephews were helping.       Blood pressure 103/54, pulse 100, temperature 97.5 °F (36.4 °C), temperature source Oral, resp. rate 16, weight 100 lb 6.4 oz (45.5 kg), SpO2 97%.     General appearance: alert, appears stated age and cooperative  Pulmonary:  clear to auscultation bilaterally  Cardiovascular: S1, S2  normal, no murmur, click, rub or gallop, regular rate and rhythm  Abdominal: soft, non-tender; bowel sounds normal; no masses,  no organomegaly  Extremities: extremities normal, atraumatic, no cyanosis or edema     [Medication Infusions]   sodium chloride 75 mL/hr at 04/14/25 0805     Lab Results   Component Value Date     WBC 7.7 04/14/2025     HGB 9.6 (L) 04/14/2025     HCT 28.7 (L) 04/14/2025     .0 04/14/2025     CREATSERUM 0.68 (L) 04/14/2025     BUN 22 04/14/2025      04/14/2025     K 3.6 04/14/2025      04/14/2025     CO2 21.0 04/14/2025      (H) 04/14/2025     CA 8.7 04/14/2025     ALB 4.0 04/13/2025     ALKPHO 223 (H) 04/13/2025     BILT 0.7 04/13/2025     TP 7.1 04/13/2025     AST 57 (H) 04/13/2025     ALT 52 (H) 04/13/2025     TSH 1.264 04/14/2025     MG 1.9 04/14/2025       CTA BRAIN + CTA CAROTIDS (CPT=70496/08163)  Result Date: 4/13/2025  CONCLUSION:  1. No major vessel occlusion, hemodynamically significant stenosis, dissection, AVM or aneurysm.    Dictated by (CST): Rocael Bruner MD on 4/13/2025 at 6:45 PM     Finalized by (CST): Rocael Bruner MD on 4/13/2025 at 6:57 PM           CT BRAIN OR HEAD (CPT=70450)  Result Date: 4/13/2025  CONCLUSION:  1. Subacute/recent left occipital cortical infarct. 2. Small probably chronic right occipital cortical infarct. 3. Old left cerebellar infarct. 4. Changes of chronic small vessel disease in cerebral white matter. 5. Large vessel intracranial atherosclerosis.    Dictated by (CST): Rocael Bruner MD on 4/13/2025 at 3:37 PM     Finalized by (CST): Rocael Bruner MD on 4/13/2025 at 3:42 PM           EKG 12 Lead  Result Date: 4/14/2025  Atrial fibrillation Right bundle branch block Abnormal ECG When compared with ECG of 13-APR-2025 15:05, Atrial fibrillation has replaced Sinus rhythm Confirmed by MARIA M WRIGHT CASH ([id number]) on 4/14/2025 11:02:51 AM     EKG 12 Lead  Result Date: 4/13/2025  Normal sinus rhythm Right atrial enlargement Right  bundle branch block Abnormal ECG No previous ECGs found in [facility]     CBC:    Lab Results   Component Value Date     WBC 7.7 04/14/2025     WBC 10.4 04/13/2025     Lab Results   Component Value Date     HGB 9.6 (L) 04/14/2025     HGB 10.6 (L) 04/13/2025     Lab Results   Component Value Date     .0 04/14/2025     .0 04/13/2025     Lab 04/13/25  1504 04/14/25  0651   * 117*   BUN 28* 22   CREATSERUM 1.00 0.68*   CA 9.1 8.7   * 137   K 4.1 3.6   CL 97* 102   CO2 26.0 21.0       Assessment and Plan:       Sarwat Townsend is a 89 year old male with a history of HTN, HLD and DM2, who presents with right-sided weakness, blurry vision, slurred speech and shortness of breath for 2 days.      # Subacute ischemic stroke  - CT head showed subacute left occipital cortical infarct, small chronic right occipital cortical infarct, and old left cerebellar infarct. MRI pending   - apprec neuro consult. Hold heparin for 2 weeks for now   - Aspirin 324 mg given, continue aspirin 81 mg daily and atorvastatin 40 mg   - Patient failed bedside speech eval  - PT, OT, SLP  - TSH, A1c, LDL - 8.7 A1c, ldl 55, tsh normal      # NSTEMI  - troponin 3741, no acute ischemic changes on ECG.   - apprec cards consult   - echo pending      # Hypertension  - soft BP, hold home amlodipine     # DM2 - on metformin 850 bid at home   - SSI Q6H  change to ac and hs   - A1c 8.7   - diet education      # Hyponatremia  - Na 133  - NS infusion  - BMP in AM     Diet: NPO  PT/OT: consulted  DVT ppx: heparin                MEDICATIONS ADMINISTERED IN LAST 1 DAY:  aspirin 300 MG rectal suppository 300 mg       Date Action Dose Route User    4/13/2025 1703 Given 300 mg Rectal Rhiannon Dilladr, RN          aspirin chewable tab 81 mg       Date Action Dose Route User    4/14/2025 1022 Given 81 mg Oral Larisa Arnold, RN          heparin (Porcine) 5000 UNIT/ML injection 5,000 Units       Date Action Dose Route User    4/14/2025 1422 Given 5,000  Units Subcutaneous (Left Lower Abdomen) Larisa Arnold RN    4/14/2025 0608 Given 5,000 Units Subcutaneous (Right Upper Arm) Akiko Rosario RN    4/13/2025 2043 Given 5,000 Units Subcutaneous (Right Lower Abdomen) Akiko Rosario RN          insulin regular human (Novolin R, Humulin R) 100 UNIT/ML injection 1-7 Units       Date Action Dose Route User    4/14/2025 1300 Given 3 Units Subcutaneous (Right Lower Abdomen) Larisa Arnold RN    4/13/2025 1917 Given 3 Units Subcutaneous (Right Upper Arm) Brigitte Espana RN     potassium chloride 40 mEq in 250mL sodium chloride 0.9% IVPB premix       Date Action Dose Route User    4/14/2025 1022 New Bag 40 mEq Intravenous Larisa Arnold RN          sodium chloride 0.9% infusion       Date Action Dose Route User    4/14/2025 0805 New Bag (none) Intravenous Larisa Arnold RN    4/13/2025 1831 New Bag (none) Intravenous Brigitte Espana RN            Vitals (last day)       Date/Time Temp Pulse Resp BP SpO2 Weight O2 Device O2 Flow Rate (L/min) [facility]    04/14/25 1300 97.5 °F (36.4 °C) -- 16 103/54 97 % -- None (Room air) --     04/14/25 0907 -- 101 -- 131/95 -- -- -- --     04/14/25 0901 -- 101 -- 131/95 -- -- -- --     04/14/25 0800 98.3 °F (36.8 °C) 81 18 104/69 95 % -- None (Room air) --     04/14/25 0607 97.8 °F (36.6 °C) 90 18 114/60 97 % -- None (Room air) --     04/13/25 2238 -- 84 18 119/58 -- -- None (Room air) --     04/13/25 2034 98 °F (36.7 °C) 81 18 109/61 99 % -- None (Room air) --     04/13/25 1811 97.8 °F (36.6 °C) 100 18 115/66 96 % 102 lb (46.3 kg) None (Room air) -- CR    04/13/25 1458 97.8 °F (36.6 °C) 95 26 112/53 99 % -- None (Room air) -- ST

## 2025-04-14 NOTE — CM/SW NOTE
04/14/25 1400   CM/SW Referral Data   Referral Source Physician   Reason for Referral Protocol order set   Specify order set Stroke   Informant Patient;Other  (param Ray)   Medical Hx   Does patient have an established PCP? Yes  (Kamlesh Durant)   Patient Info   Patient's Current Mental Status at Time of Assessment Alert;Oriented   Patient's Home Environment House   Number of Levels in Home 2   Number of Stair in Home 11 to basement, 1 to enter   Patient lives with Alone   Patient Status Prior to Admission   Independent with ADLs and Mobility Yes   Services in place prior to admission DME/Supplies at home   Type of DME/Supplies Straight Cane   Discharge Needs   Anticipated D/C needs Acute rehab   Services Requested   PMR Consult Requested Requested order from provider   Choice of Post-Acute Provider   Informed patient of right to choose their preferred provider Yes   List of appropriate post-acute services provided to patient/family with quality data   (AR ref in [facility] - needs f/up)     Received MDO for HH Evaluation per stroke w/up protocol.  Per PT/OT, Anticipated therapy need: Intensive Therapy Program    MD is aware and confirmed PMR consult will be placed.    SW met w/ pt and pt's nephew Ray at bedside. Above assessment completed.  Pt was able to verify his home address and confirm living alone.    Pt informed SW he lives in a ranch style home w/ a basement. Pt less frequently uses the basement for laundry. He has approx 11 steps downstairs and 1 step to enter the home.    Pt confirmed being independent at baseline. Pt has a cane for ambulation PRN.   Pt did not drive prior to admission. Pt's nephew confirmed family assists w/ transportation needs.    Pt denies hx w/ HH or rehab.    Discussed Acute rehab, explained referral process, and next steps for PMR consult.    Pt and nephew are agreeable to Acute Rehab pending PMR recommendation.    All questions addressed answered about AR referral, list of quality  data, and need for insurance authorization.    Acute Rehab referrals sent in [facility] for review.      PLAN: Poss Acute Rehab - pending PMR, list/choice, ins auth, & med clear        SW/CM to remain available for support and/or discharge planning.         KANCHAN Wright, LSW [id number]

## 2025-04-14 NOTE — PLAN OF CARE
Patient is alert and oriented x4. Patient was in atrial fibrillation but converted to normal sinus rhythm on own. Patient is getting neuro checks q4. Patient has right sided weakness with delayed responses. Went for MRI of brain today. Voiding via male purewick. Moves x1 standby assist. Call light within reach. Safety measures in place.     Problem: Patient Centered Care  Goal: Patient preferences are identified and integrated in the patient's plan of care  Description: Interventions:- What would you like us to know as we care for you? From home alone, my niece and nephew help out. - Provide timely, complete, and accurate information to patient/family- Incorporate patient and family knowledge, values, beliefs, and cultural backgrounds into the planning and delivery of care- Encourage patient/family to participate in care and decision-making at the level they choose- Honor patient and family perspectives and choices  Outcome: Progressing     Problem: Diabetes/Glucose Control  Goal: Glucose maintained within prescribed range  Description: INTERVENTIONS:- Monitor Blood Glucose as ordered- Assess for signs and symptoms of hyperglycemia and hypoglycemia- Administer ordered medications to maintain glucose within target range- Assess barriers to adequate nutritional intake and initiate nutrition consult as needed- Instruct patient on self management of diabetes  Outcome: Progressing     Problem: Patient/Family Goals  Goal: Patient/Family Long Term Goal  Description: Patient's Long Term Goal: To be discharged. Interventions:- Medications, diagnostics- See additional Care Plan goals for specific interventions  Outcome: Progressing  Goal: Patient/Family Short Term Goal  Description: Patient's Short Term Goal: Coming up with a plan for home. Interventions: - Medications, diagnostics. - See additional Care Plan goals for specific interventions  Outcome: Progressing     Problem: NEUROLOGICAL - ADULT  Goal: Achieves stable or  improved neurological status  Description: INTERVENTIONS- Assess for and report changes in neurological status- Initiate measures to prevent increased intracranial pressure- Maintain blood pressure and fluid volume within ordered parameters to optimize cerebral perfusion and minimize risk of hemorrhage- Monitor temperature, glucose, and sodium. Initiate appropriate interventions as ordered  Outcome: Progressing  Goal: Achieves maximal functionality and self care  Description: INTERVENTIONS- Monitor swallowing and airway patency with patient fatigue and changes in neurological status- Encourage and assist patient to increase activity and self care with guidance from PT/OT- Encourage visually impaired, hearing impaired and aphasic patients to use assistive/communication devices  Outcome: Progressing

## 2025-04-14 NOTE — CONSULTS
[facility] [place]  [address], [address]  [place] [address]  [phone]          INPATIENT NEUROLOGY   INITIAL CONSULT NOTE       [facility]  part of [facility]    Report of Consultation    Sarwat Townsend Patient Status:  Inpatient     1932 MRN [id number]    Location [facility] 3W/SW Attending Mirlande Amaya, DO    Hosp Day # 1 PCP Kamlesh Durant MD      Date of Admission:  2025  Date of Consult:  2025    HPI:     Sarwat Townsend is a 89 year old man with past medical history of type 2 diabetes, hypertension, hyperlipidemia who presents with 2 days of right hemiparesis, dysarthria, blurry vision and dyspnea.  Did not seek medical attention until about 2 days of past.  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  Found to have significantly elevated troponin by cardiology suspecting type II demand ischemia.  Also found to have new onset atrial fibrillation.  Nephew is at bedside and is quite close to the patient.  Usually spends time with the patient every other day for outings.  The patient had not been feeling well on Saturday and asked for his nephew to come over on , where they found him confused, unable to make coffee and weak.  Usually the patient is independent and lives alone.  The patient says he is having blurry vision, right arm numbness and weakness.      CURRENT MEDICATIONS  Current Medications[1]    OUTPATIENT MEDICATIONS  Medications Ordered Prior to Encounter[2]     MEDICAL HISTORY  Past Medical History[3]    ?SURGICAL HISTORY  Past Surgical History[4]    SOCIAL HISTORY  Social Hx on file[5]    FAMILY HISTORY  Family History[6]    ALLERGIES  Allergies[7]    ?REVIEW OF SYSTEMS:   13-point review of systems was done and is negative unless otherwise stated in HPI.     ?PHYSICAL EXAM:     BP (!) 131/95 (BP Location: Right arm)   Pulse 101   Temp 98.3 °F (36.8  °C) (Oral)   Resp 18   Wt 100 lb 6.4 oz (45.5 kg)   SpO2 95%   BMI 16.20 kg/m²   General appearance: Well appearing, and in no acute distress  Skin: skin color, texture normal.  No rashes or lesions.    Head: Normocephalic, atraumatic.    Neurological exam:  Mental Status: Alert, Follows commands, and loss of fluency of speech   Cranial Nerves: right homonymous hemianopia, extraocular movements intact, facial sensation intact, right facial droop, normal bedside auditory acuity bilaterally, moderate dysarthria  Motor: right upper extremity pronator drift with 4-/5; otherwise 5/5   Sensation: intact to light touch  Coordination: Finger-to- nose-finger intact bilaterally   Gait: not assessed     LABS/DATA:    Lab Results   Component Value Date    WBC 7.7 04/14/2025    HGB 9.6 04/14/2025    HCT 28.7 04/14/2025    .0 04/14/2025    CREATSERUM 0.68 04/14/2025    BUN 22 04/14/2025     04/14/2025    K 3.6 04/14/2025     04/14/2025    CO2 21.0 04/14/2025     04/14/2025    CA 8.7 04/14/2025    ALB 4.0 04/13/2025    ALKPHO 223 04/13/2025    BILT 0.7 04/13/2025    TP 7.1 04/13/2025    AST 57 04/13/2025    ALT 52 04/13/2025    TSH 1.264 04/14/2025    MG 1.9 04/14/2025       HGBA1C:    Lab Results   Component Value Date    A1C 8.7 (H) 04/14/2025    A1C 8.7 (H) 02/21/2025    A1C 7.7 (H) 08/20/2024     (H) 04/14/2025       Lab Results   Component Value Date    LDL 55 04/13/2025    HDL 74 04/13/2025    TRIG 68 04/13/2025    VLDL 10 04/13/2025           IMAGING:  CTA BRAIN + CTA CAROTIDS (CPT=70496/54598)  Result Date: 4/13/2025  CONCLUSION:  1. No major vessel occlusion, hemodynamically significant stenosis, dissection, AVM or aneurysm.    Dictated by (CST): Rocael Bruner MD on 4/13/2025 at 6:45 PM     Finalized by (CST): Rocael Bruner MD on 4/13/2025 at 6:57 PM          CT BRAIN OR HEAD (CPT=70450)  Result Date: 4/13/2025  CONCLUSION:  1. Subacute/recent left occipital cortical infarct. 2.  Small probably chronic right occipital cortical infarct. 3. Old left cerebellar infarct. 4. Changes of chronic small vessel disease in cerebral white matter. 5. Large vessel intracranial atherosclerosis.    Dictated by (CST): Rocael Bruner MD on 4/13/2025 at 3:37 PM     Finalized by (CST): Rocael Bruner MD on 4/13/2025 at 3:42 PM           I PERSONALLY REVIEWED THESE IMAGES.     ASSESSMENT:  The patient is a 89 year old  man with past medical history of type 2 diabetes, hypertension, hyperlipidemia who presents with 2 days of right hemiparesis, dysarthria, blurry vision and dyspnea.  Did not seek medical attention until about 2 days of past.  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  Found to have significantly elevated troponin by cardiology suspecting type II demand ischemia.  Also found to have new onset atrial fibrillation.  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  Left occipital lobe infarct is moderate in size.  While we will await MRI brain for final stroke size, due to the size and location of stroke most likely would recommend waiting 2 weeks before consideration of starting anticoagulation. From neurological perspective, reasonable to continue Aspirin with anticoagulation due to bilateral PCA stenoses.  -Continue Atorvastatin 40 mg daily   -MRI brain  -PT, OT and speech therapy   -Blood pressure goals: Normotensive, no more than 25% drop per day  -Cannot live alone after this stroke due to right hemiparesis, right homonymous hemianopia, expressive aphasia     STROKE RISK FACTORS:   *Hypertension - Target blood pressure <140/90, <130/85 for high  risk, normal 120/80  *Physical inactivity - target exercise at least 3 times per week  *Obesity - target ideal body weight and girth <35\" for women, <40\" for men  *Diabetes - Target <6.5-7%   *Smoking - Target smoking cessation  *Hyperlipidemia - Target total cholesterol < 200, Target LDL <100, < 70 for high risk, Target HDL >45 for men, >55 for women, Target triglycerides <150      This note was prepared using Dragon Medical voice recognition dictation software and as a result, errors may occur. When identified, these errors have been corrected. While every attempt is made to correct errors during dictation, discrepancies may still exist    BETTY Cordova DO   Staff Neurologist   4/14/2025  11:53 AM                     [1]   No current outpatient medications on file.   [2]   No current facility-administered medications on file prior to encounter.     Current Outpatient Medications on File Prior to Encounter   Medication Sig Dispense Refill    amLODIPine 10 MG Oral Tab Take 1 tablet (10 mg total) by mouth daily. 90 tablet 3    metFORMIN 850 MG Oral Tab Take 1 tablet (850 mg total) by mouth 2 (two) times daily with meals. 180 tablet 3    Cholecalciferol (VITAMIN D3) 25 MCG (1000 UT) Oral Cap Take 1 tablet by mouth in the morning.      ezetimibe 10 MG Oral Tab Take 1 tablet (10 mg total) by mouth daily. 90 tablet 3    Multiple Vitamins-Minerals (EYE-VITES) Oral Tab Take by mouth in the morning and before bedtime.      B Complex-C-Folic Acid Oral Tab Take by mouth.      clobetasol 0.05 % External Cream Use bid to red itchy areas on chest arms neck 45 g 0   [3]   Past Medical History:   Arthritis    Essential hypertension    Hyperlipidemia    Prediabetes   [4]   Past Surgical History:  Procedure Laterality Date    Hernia surgery      Other      bilateral cataract    Tonsillectomy     [5]   Social History  Socioeconomic History    Marital status:    Tobacco Use    Smoking status: Former     Current packs/day: 0.00      Types: Cigarettes     Start date:      Quit date:      Years since quittin.3     Passive exposure: Never    Smokeless tobacco: Never   Vaping Use    Vaping status: Never Used   Substance and Sexual Activity    Alcohol use: Yes     Comment: 1/month    Drug use: Never   Other Topics Concern    Reaction to local anesthetic No   [6]   Family History  Problem Relation Age of Onset    Heart Disorder Maternal Grandmother     Diabetes Mother    [7]   Allergies  Allergen Reactions    Lisinopril HIVES

## 2025-04-15 ENCOUNTER — APPOINTMENT (OUTPATIENT)
Dept: CT IMAGING | Facility: HOSPITAL | Age: OVER 89
End: 2025-04-15
Attending: Other
Payer: MEDICARE

## 2025-04-15 ENCOUNTER — APPOINTMENT (OUTPATIENT)
Dept: GENERAL RADIOLOGY | Facility: HOSPITAL | Age: OVER 89
End: 2025-04-15
Attending: HOSPITALIST
Payer: MEDICARE

## 2025-04-15 PROBLEM — I63.9 OCCIPITAL STROKE (HCC): Status: RESOLVED | Noted: 2025-04-14 | Resolved: 2025-04-15

## 2025-04-15 PROBLEM — I63.9 OCCIPITAL STROKE (HCC): Status: RESOLVED | Noted: 2025-01-01 | Resolved: 2025-01-01

## 2025-04-15 PROBLEM — I63.9 ACUTE EMBOLIC STROKE (HCC): Status: ACTIVE | Noted: 2025-04-13

## 2025-04-15 PROBLEM — I63.9 ACUTE EMBOLIC STROKE (HCC): Status: ACTIVE | Noted: 2025-01-01

## 2025-04-15 LAB
GLUCOSE BLDC GLUCOMTR-MCNC: 147 MG/DL (ref 70–99)
GLUCOSE BLDC GLUCOMTR-MCNC: 235 MG/DL (ref 70–99)
GLUCOSE BLDC GLUCOMTR-MCNC: 267 MG/DL (ref 70–99)
GLUCOSE BLDC GLUCOMTR-MCNC: 290 MG/DL (ref 70–99)
POTASSIUM SERPL-SCNC: 4 MMOL/L (ref 3.5–5.1)

## 2025-04-15 PROCEDURE — 99233 SBSQ HOSP IP/OBS HIGH 50: CPT | Performed by: OTHER

## 2025-04-15 PROCEDURE — 71045 X-RAY EXAM CHEST 1 VIEW: CPT | Performed by: HOSPITALIST

## 2025-04-15 PROCEDURE — 70450 CT HEAD/BRAIN W/O DYE: CPT | Performed by: OTHER

## 2025-04-15 PROCEDURE — 99233 SBSQ HOSP IP/OBS HIGH 50: CPT | Performed by: HOSPITALIST

## 2025-04-15 RX ORDER — FUROSEMIDE 10 MG/ML
20 INJECTION INTRAMUSCULAR; INTRAVENOUS ONCE
Status: COMPLETED | OUTPATIENT
Start: 2025-04-15 | End: 2025-04-15

## 2025-04-15 NOTE — PLAN OF CARE
Patient is alert and oriented x4. Q4 neuro checks. Put on 3L of O2 after working with therapy. Patient saturating at 95 with oxygen off. Patient reports numbness in right hand on and off. Repeat CT done and Chest x-ray. Lasix x1 given. Starting IV antibiotics. Moves x1 moderate assist. Call light within reach. Safety measures in place.     Problem: Patient Centered Care  Goal: Patient preferences are identified and integrated in the patient's plan of care  Description: Interventions:- What would you like us to know as we care for you? From home with help of niece and nephew- Provide timely, complete, and accurate information to patient/family- Incorporate patient and family knowledge, values, beliefs, and cultural backgrounds into the planning and delivery of care- Encourage patient/family to participate in care and decision-making at the level they choose- Honor patient and family perspectives and choices  Outcome: Progressing     Problem: Diabetes/Glucose Control  Goal: Glucose maintained within prescribed range  Description: INTERVENTIONS:- Monitor Blood Glucose as ordered- Assess for signs and symptoms of hyperglycemia and hypoglycemia- Administer ordered medications to maintain glucose within target range- Assess barriers to adequate nutritional intake and initiate nutrition consult as needed- Instruct patient on self management of diabetes  Outcome: Progressing     Problem: Patient/Family Goals  Goal: Patient/Family Long Term Goal  Description: Patient's Long Term Goal: To be discharged Interventions:- Medications, diagnostics- See additional Care Plan goals for specific interventions  Outcome: Progressing  Goal: Patient/Family Short Term Goal  Description: Patient's Short Term Goal: Coming up with plan. Interventions: - Medications, diagnostics- See additional Care Plan goals for specific interventions  Outcome: Progressing     Problem: NEUROLOGICAL - ADULT  Goal: Achieves stable or improved neurological  status  Description: INTERVENTIONS- Assess for and report changes in neurological status- Initiate measures to prevent increased intracranial pressure- Maintain blood pressure and fluid volume within ordered parameters to optimize cerebral perfusion and minimize risk of hemorrhage- Monitor temperature, glucose, and sodium. Initiate appropriate interventions as ordered  Outcome: Progressing  Goal: Achieves maximal functionality and self care  Description: INTERVENTIONS- Monitor swallowing and airway patency with patient fatigue and changes in neurological status- Encourage and assist patient to increase activity and self care with guidance from PT/OT- Encourage visually impaired, hearing impaired and aphasic patients to use assistive/communication devices  Outcome: Progressing

## 2025-04-15 NOTE — SLP NOTE
SPEECH DAILY NOTE - INPATIENT    ASSESSMENT & PLAN   ASSESSMENT  PPE REQUIRED. THIS THERAPIST WORE GLOVES, DROPLET MASK, AND GOGGLES FOR DURATION OF EVALUATION. HANDS WASHED UPON ENTRANCE/EXIT.    SLP f/u for ongoing dysphagia tx/meal assessment per recommendations of regular/thin per BSE. RN reports pt tolerates diet and medication well with no overt clinical s/s aspiration. Pt denies any swallowing challenges.     Pt positioned upright in bed. Pt afebrile, tolerating room air with oxygen status 98 prior to the start of oral trials. SLP reviewed aspiration precautions and safe swallowing compensatory strategies with the patient. Safe swallow guidelines remain written on the white board in purple. Diet recommendations remain on the whiteboard in the room. Patient v/u. Provided no assistance, pt tolerates regular and thin liquids via straw with no overt clinical signs/symptoms of aspiration. Oxygen status remained >97 t/o the entire session. Oral/buccal cavities clear of residue following all trials.    PLAN: SLP to f/u x1 meal assessment, monitor imaging, and VFSS if clinically warranted.     Diet Recommendations - Solids: Regular  Diet Recommendations - Liquids: Thin Liquids       Aspiration Precautions: Upright position, Slow rate, Small bites, Small sips  Medication Administration Recommendations:  (as tolerated)    Patient Experiencing Pain: No                Treatment Plan  Treatment Plan/Recommendations: Aspiration precautions, Cognitive communication therapy, Dysarthria therapy    Interdisciplinary Communication: Discussed with RN  Recommendations posted at bedside            GOALS  Goal #1 The patient will tolerate regular consistency and thin liquids without overt signs or symptoms of aspiration with 100 % accuracy over 1-2 session(s).  In Progress   Goal #2 The patient/family/caregiver will demonstrate understanding and implementation of aspiration precautions and swallow strategies independently over 1-2  session(s).    In Progress   Goal #3 The patient will utilize compensatory strategies as outlined by  BSSE (clinical evaluation) including Slow rate, Small bites, Small sips, No straws, Upright 90 degrees, Eliminate distractions with PRN assistance 100 % of the time across 1-2 sessions.  In Progress     FOLLOW UP  Follow Up Needed (Documentation Required): Yes  SLP Follow-up Date: 04/16/25  Duration: 1 week    Session: 1    If you have any questions, please contact BUCK Mayes M.S. CCC-SLP  Speech Language Pathologist  Phone Number [phone]. [phone]

## 2025-04-15 NOTE — CONSULTS
[facility]  part of [facility]      Sarwat Townsend Patient Status:  Inpatient    1932 MRN [id number]   Location [facility] 3W/SW Attending Mirlande Amaya, DO   Hosp Day # 2 PCP Kamlesh Durant MD       CC: Self-care mobility deficits, right-sided paresthesias expressive aphasia status post CVA    History of Present Illness:  82-year-old gentleman who lives in a one-story home with a full basement who is relatively independent ambulator with a cane, no longer drives has support from his niece and nephew present to the hospital after development of right-sided weakness, blurry vision dysarthria over the course of 2 days.  He did not immediately seek medical attention however was found by his family to be disoriented unable to make a cup of coffee and was brought to the hospital for further workup.   CT of the head showed subacute left occipital cortical infarct as well as evidence of old left-sided cerebellar infarcts, as well as chronic right appearing right occipital cerebellar infarcts.  He was found to have elevated troponins and thought to have demand ischemia type II.  Also found to have new onset atrial fibrillation.  A1c was found to be 8.7.  Strokes are thought to be cardioembolic in nature.  He is not a candidate for thrombectomy as he presented outside the window.  MRI confirms multiple bilateral acute cerebral infarcts with largest in the left greater than right occipital lobe, however still involves the cortex and subcortical regions of the frontal and parietal lobes.  Also noted are multiple acute bilateral cerebellar infarcts, and acute left thalamus infarct as well as chronic small vessel disease in the cerebral white matter.  He has been started on aspirin monotherapy.  And due to the size of the infarct neurology has recommended waiting 2 weeks before considering anticoagulation.  There is discussion for aspirin with anticoagulation as he is found to have bilateral  PCA stenosis.  He has been started on atorvastatin.  Diabetic management has been initiated.  Goal is for blood pressure are normotensive.  He is noted to have a homonymous hemianopsia.  We were asked to see what the best way to approach his rehabilitation would be.  We were asked to see what the best way to approach his rehabilitation would be.  There is concern for his ability to continue to live on his own.    Prescriptions Prior to Admission[1]  Allergies[2]  Past Medical History[3]  Past Surgical History[4]  Social History     Socioeconomic History    Marital status:      Spouse name: Not on file    Number of children: Not on file    Years of education: Not on file    Highest education level: Not on file   Occupational History    Not on file   Tobacco Use    Smoking status: Former     Current packs/day: 0.00     Types: Cigarettes     Start date:      Quit date:      Years since quittin.3     Passive exposure: Never    Smokeless tobacco: Never   Vaping Use    Vaping status: Never Used   Substance and Sexual Activity    Alcohol use: Yes     Comment: 1/month    Drug use: Never    Sexual activity: Not on file   Other Topics Concern    Grew up on a farm Not Asked    History of tanning Not Asked    Outdoor occupation Not Asked    Reaction to local anesthetic No   Social History Narrative    Not on file     Social Drivers of Health     Food Insecurity: Patient Declined (2025)    NCSS - Food Insecurity     Worried About Running Out of Food in the Last Year: Patient declined     Ran Out of Food in the Last Year: Patient declined   Transportation Needs: Patient Declined (2025)    NCSS - Transportation     Lack of Transportation: Patient declined   Stress: Not on file   Housing Stability: Patient Declined (2025)    NCSS - Housing/Utilities     Has Housing: Patient declined     Worried About Losing Housing: Patient declined     Unable to Get Utilities: Patient declined     Family  History[5]    PAIN SCALE: Denies    ACP: Full code    Review of Systems  She denies any shortness of breath, chest pain, nausea, dizziness, vomiting.  Stopped driving a year ago.  His niece and nephew visited him on a regular basis.  He uses a cane to ambulate.  He was able to go up and down the stairs to his basement to do his laundry.  He is aware that he is dysarthric.  He states he feels \"foggy \".  Seems to have some insight into his deficits.  Could perform all basic ADLs and ambulation at home.  Denies any recent history of falls.  Other system a 12 point view are negative.    Physical Exam  Temp:  [97.5 °F (36.4 °C)-98.4 °F (36.9 °C)] 98.4 °F (36.9 °C)  Pulse:  [] 79  Resp:  [16-18] 18  BP: (103-138)/(54-95) 138/72  SpO2:  [91 %-97 %] 91 %  91%    I/O last 3 completed shifts:  In: 360 [P.O.:360]  Out: 1000 [Urine:1000]  I/O this shift:  In: 245 [P.O.:245]  Out: -        CVS-S1-S2 RRR no ankle edema bilateral lower extremities  Extremities-warm to touch, peripheral pulses are palpable  Lymph nodes-no palpable lymph nodes in neck or groin  Skin-intact  MSK-thin musculature.  Right upper and lower EXTR strength is a 4 out of 5 when compared to left which is a 5-5 given his age  Neurological exam-slight dysarthria, insight into deficits, sensation appears to be intact to light touch and joint proprioception.  Reasonable historian.    Previous Function:     Modified independent with a cane.  Lives by himself in a one-story home with a full basement.  No recent history of EtOH abuse or smoking.  Gets visited on a daily or regular basis by his niece and nephew who lives in [place].    CURRENT FUNCTION:  AM-PAC Score:  Raw Score: 12   Approx Degree of Impairment: 68.66%   Standardized Score (AM-PAC Scale): 35.33   CMS Modifier (G-Code): CL     FUNCTIONAL ABILITY STATUS  Functional Mobility/Gait Assessment  Gait Assistance: Moderate assistance  Distance (ft): 5  Assistive Device: Rolling walker  Pattern:  Shuffle, Ataxic  Rolling: supervision  Supine to Sit: moderate assist  Sit to Supine: moderate assist    Labs  Lab Results   Component Value Date    WBC 7.7 04/14/2025    HGB 9.6 (L) 04/14/2025    HCT 28.7 (L) 04/14/2025    MCV 93.2 04/14/2025    .0 04/14/2025     No results found for: \"PTT\"  No results found for: \"PROTIME\"  Lab Results   Component Value Date     04/14/2025    K 4.0 04/15/2025    CO2 21.0 04/14/2025     04/14/2025    BUN 22 04/14/2025       Radiology:  MRI BRAIN (CPT=70551)  Result Date: 4/14/2025  PROCEDURE: MRI BRAIN (CPT=70551)  COMPARISON: [facility], CT BRAIN OR HEAD (CPT=70450), 1/17/2022, 9:07 PM.  [facility], CTA BRAIN + CTA CAROTIDS (CPT=70496/18445), 4/13/2025, 5:23 PM.  INDICATIONS: Follow-up stroke Assess final stroke size  TECHNIQUE: A variety of imaging planes and parameters were utilized for visualization of suspected pathology.  Images were performed without contrast.  FINDINGS:  CEREBRUM: Cortical restricted diffusion measuring about 2.7 cm in the left occipital lobe and smaller adjacent discontiguous foci which extend into parietal lobe.  Multiple acute small cortical and subcortical infarcts in the bilateral frontal and parietal lobes.  Small acute cortical infarcts in the right occipital lobe with largest focus measuring 1.6 cm.  Coexistent nonacute foci of FLAIR hyperintensity in the cerebral white matter bilaterally. CEREBELLUM: Multiple foci of restricted diffusion in both cerebellar hemispheres with largest on the left measuring 1.8 cm and largest on the right measuring 1.3 cm. BRAINSTEM: No edema, hemorrhage, mass, acute infarction, or inappropriate atrophy.  CSF SPACES: Ventricles, cisterns, and sulci are appropriate for age.  No hydrocephalus, subarachnoid hemorrhage, or mass.  SKULL: No mass or other significant visible lesion.  SINUSES: Limited views demonstrate no significant mucosal thickening or fluid.   ORBITS: Previous bilateral cataract surgery. OTHER: Flow is present in the anterior and posterior circulation vessels.         CONCLUSION:  1. Multiple bilateral acute cerebral infarcts with largest in left greater than right occipital lobe, but also involving the cortex and subcortical regions of the frontal and parietal lobes. 2. Multiple acute bilateral cerebellar infarcts. 3. Acute left thalamic lacunar infarct. 4. Changes of chronic small vessel disease in cerebral white matter. 5. No hemorrhage.    Dictated by (CST): Rocael Bruner MD on 2025 at 4:20 PM     Finalized by (CST): Rocael Bruner MD on 2025 at 4:28 PM          CARD ECHO 2D DOPPLER (CPT=93306)  Result Date: 2025  Transthoracic Echocardiogram Name:Sarwat Townsend Date: 2025 :  1932 Ht:  (66in)  BP: 109 / 59 MRN:  [id number]    Age:  89years    Wt:  (112lb) HR: 95bpm Loc:  [facility]       Gndr: M          BSA: 1.56m^2 Sonographer: MARTIR Lopez RDCS Ordering:    Day Wilson Consulting:  Luis Sandra ---------------------------------------------------------------------------- History/Indications:  Right sided weakness, blurred vision ---------------------------------------------------------------------------- Procedure information:  A transthoracic complete 2D study was performed. Additional evaluation included M-mode, complete spectral Doppler, and color Doppler.  Patient status:  Inpatient.  Location:  Emergency department. This was a STAT study. Transthoracic echocardiography for diagnosis, ventricular function evaluation, and assessment of valvular function. Image quality was adequate. The study was technically limited due to poor acoustic window availability, body habitus, patient supine, and patient sitting up. ECG rhythm:   Normal sinus ---------------------------------------------------------------------------- Conclusions: 1. Left ventricle: The cavity size was normal. Wall thickness was normal.    Systolic function  was probably normal. The estimated ejection fraction    was 50%, by visual assessment. Wall motion is normal; there are no    regional wall motion abnormalities. Left ventricular diastolic function    parameters were normal for the patient's age. 2. Aortic valve: The valve was probably trileaflet. The leaflets were    moderately calcified. There was thickening, consistent with sclerosis. Impressions:  No previous study was available for comparison. * ---------------------------------------------------------------------------- * Findings: Left ventricle:  The cavity size was normal. Wall thickness was normal. Systolic function was probably normal. The estimated ejection fraction was 50%, by visual assessment. Wall motion is normal; there are no regional wall motion abnormalities. Left ventricular diastolic function parameters were normal for the patient's age. Left atrium:  The atrium was normal in size. Right ventricle:  The cavity size was normal. Systolic function was normal. Right atrium:  The atrium was normal in size. Mitral valve:  Not well visualized. The leaflets were mildly thickened. Leaflet separation was normal.  Doppler:  Transvalvular velocity was within the normal range. There was no evidence for stenosis. There was no significant regurgitation.    The valve area by pressure half-time was 7.59cm^2. The valve area index by pressure half-time was 4.86cm^2/m^2. Aortic valve:  Not well visualized.  The valve was probably trileaflet. The leaflets were moderately calcified. There was thickening, consistent with sclerosis. Cusp separation was normal.  Doppler:  Transvalvular velocity was within the normal range. There was no evidence for stenosis. There was no significant regurgitation. Tricuspid valve:  The valve is structurally normal. Leaflet separation was normal.  Doppler:  Transvalvular velocity was within the normal range. There was no evidence for stenosis. There was no significant regurgitation.  Pulmonic valve:   The valve is structurally normal. Cusp separation was normal.  Doppler:  Transvalvular velocity was within the normal range. There was no evidence for stenosis. There was mild regurgitation. Pericardium:   There was no pericardial effusion. Pleura:  No evidence of pleural fluid accumulation. Aorta: Aortic root: The aortic root was poorly visualized. Ascending aorta: The ascending aorta was poorly visualized. Pulmonary arteries: The main pulmonary artery was normal-sized. Systemic veins:  Not visualized. ---------------------------------------------------------------------------- Measurements  Left ventricle                  Value          Ref  IVS thickness, ED, PLAX         0.8   cm       0.6 - 1.0  LV ID, ED, PLAX                 4.7   cm       4.2 - 5.8  LV ID, ES, PLAX                 3.4   cm       2.5 - 4.0  LV PW thickness, ED, PLAX       0.8   cm       0.6 - 1.0  IVS/LV PW ratio, ED, PLAX       1.00           ---------  LV PW/LV ID ratio, ED, PLAX     0.17           ---------  LV ejection fraction            54    %        52 - 72  Stroke volume/bsa, 2D           26    ml/m^2   ---------  LV e', lateral              (L) 7.1   cm/sec   >=10.0  LV E/e', lateral                10             <=13  LV e', medial                   7.2   cm/sec   >=7.0  LV E/e', medial                 10             ---------  LV e', average                  7.2   cm/sec   ---------  LV E/e', average                10             <=14  LVOT                            Value          Ref  LVOT ID                         1.8   cm       ---------  LVOT peak velocity, S           0.89  m/sec    ---------  LVOT VTI, S                     16.2  cm       ---------  LVOT peak gradient, S           3     mm Hg    ---------  LVOT mean gradient, S           2     mm Hg    ---------  Stroke volume (SV), LVOT DP     41    ml       ---------  Stroke index (SV/bsa), LVOT     26    ml/m^2   ---------  DP  Mitral valve                     Value          Ref  Mitral E-wave peak velocity     0.69  m/sec    ---------  Mitral A-wave peak velocity     1.11  m/sec    ---------  Mitral deceleration time        99    ms       ---------  Mitral pressure half-time       29    ms       ---------  Mitral E/A ratio, peak          0.6            ---------  Mitral valve area, PHT, DP      7.59  cm^2     ---------  Mitral valve area/bsa, PHT,     4.86  cm^2/m^2 ---------  DP  Right ventricle                 Value          Ref  RV s', lateral                  16.3  cm/sec   >=9.5  Pulmonic valve                  Value          Ref  Pulmonic regurg velocity,       1.38  m/sec    ---------  ED  Pulmonic regurg gradient,       8     mm Hg    ---------  ED Legend: (L)  and  (H)  anna values outside specified reference range. ---------------------------------------------------------------------------- Prepared and electronically signed by Attila Chavez 04/14/2025 11:01     CTA BRAIN + CTA CAROTIDS (CPT=70496/26761)  Result Date: 4/13/2025  PROCEDURE: CTA BRAIN + CTA CAROTIDS (CPT=70496/80983)  COMPARISON: [facility], CT BRAIN OR HEAD (CPT=70450), 4/13/2025, 3:23 PM.  INDICATIONS: Right-sided weakness and blurred vision.  TECHNIQUE:   CT images of the neck and brain were obtained with non-ionic intravenous contrast material. Multi-planar reformatted/3-D images were created to optimize visualization of vascular anatomy. Automated exposure control for dose reduction was used. Dose information is transmitted to the ACR (American College of Radiology) NRDR (National Radiology Data Registry) which includes the Dose Index Registry.Evaluation of internal carotid stenosis is based on NASCET Criteria.   FINDINGS: Mild atherosclerotic calcification cavernous carotids, and proximal internal carotids without significant stenosis. CAROTID ARTERIES: RIGHT COMMON CAROTID: No hemodynamically significant stenosis or dissection.  RIGHT INTERNAL CAROTID: No  hemodynamically significant stenosis or dissection.   LEFT COMMON CAROTID: No hemodynamically significant stenosis or dissection.  LEFT INTERNAL CAROTID: No hemodynamically significant stenosis or dissection.   VERTEBRAL ARTERIES:  Calcified plaques associated with mild to moderate narrowing of vertebral origin bilaterally and moderate narrowing of the left V4 segment. RIGHT: No hemodynamically significant stenosis or dissection.  LEFT: No hemodynamically significant stenosis or dissection.   BASILAR ARTERY: No hemodynamically significant stenosis or dissection.     AORTIC ARCH (Limited): Atherosclerotic calcification.  No aneurysm or dissection.  MEDIASTINUM/APICES (Limited): Calcified left upper lobe pulmonary granuloma.  No mass or adenopathy.   OTHER: No suspicious neck mass or lymphadenopathy.  Degenerative changes in the spine.  INTRACRANIAL ARTERIES: ANTERIOR CEREBRALS:  No significant stenosis.  No visible aneurysm or vascular malformation.  MIDDLE CEREBRALS: No significant stenosis.  No visible aneurysm or vascular malformation. POSTERIOR CEREBRALS: Moderate narrowings in proximal and distal right P2 and mild mild-to-moderate narrowings of the distal left P2.  Slight asymmetry to the distal cortical branches of posterior cerebral with left larger than right.  No visible aneurysm  or vascular malformation.  OTHER FINDINGS:  Subacute/recent left occipital cortical infarct.  Small probable chronic right occipital cortical infarct.  Old left cerebellar infarct.  Changes of chronic small vessel disease in cerebral white matter.         CONCLUSION:  1. No major vessel occlusion, hemodynamically significant stenosis, dissection, AVM or aneurysm.    Dictated by (CST): Rocael Bruner MD on 4/13/2025 at 6:45 PM     Finalized by (CST): Rocael Bruner MD on 4/13/2025 at 6:57 PM          CT BRAIN OR HEAD (CPT=70450)  Result Date: 4/13/2025  PROCEDURE: CT BRAIN OR HEAD (CPT=70450)  COMPARISON: [facility],  CT BRAIN OR HEAD (CPT=70450), 1/17/2022, 9:07 PM.  INDICATIONS: Right-sided weakness and blurred vision.  TECHNIQUE: CT images were obtained without contrast material.  Automated exposure control for dose reduction was used.  Dose information is transmitted to the ACR (American College of Radiology) NRDR (National Radiology Data Registry) which includes the Dose Index Registry.  FINDINGS:  CSF SPACES: No hydrocephalus, subarachnoid hemorrhage, or mass.  No midline shift.  CEREBRUM: Edema in left occipital lobe.  Smaller focus of encephalomalacia in the right occipital lobe.  No mass or hemorrhage.  Decreased attenuation in periventricular and subcortical white matter bilaterally.  No mass or hemorrhage.  CEREBELLUM: Old left superior cerebellar infarct.  No edema, hemorrhage, mass or acute infarct. BRAINSTEM: No edema, hemorrhage, mass or acute infarct. CALVARIUM: Skull base and calvarium are intact. SINUSES: Limited views demonstrate no significant mucosal thickening or fluid.  ORBITS: Previous bilateral cataract surgery.  OTHER: Atherosclerotic calcification cavernous carotid and vertebral arteries.          CONCLUSION:  1. Subacute/recent left occipital cortical infarct. 2. Small probably chronic right occipital cortical infarct. 3. Old left cerebellar infarct. 4. Changes of chronic small vessel disease in cerebral white matter. 5. Large vessel intracranial atherosclerosis.    Dictated by (CST): Rocael Bruner MD on 4/13/2025 at 3:37 PM     Finalized by (CST): Rocael Bruner MD on 4/13/2025 at 3:42 PM          XR CERVICAL SPINE (4VIEWS) (CPT=72050)  Result Date: 3/24/2025  PROCEDURE: XR CERVICAL SPINE (4 OR 5 VIEWS) (CPT=72050)  COMPARISON: None available.  INDICATIONS: Diffuse cervical pain and scalp burning sensation ongoing for 1 year. No known recent precipitating antecedent injury.  TECHNIQUE: Cervical spine radiographs (5 views)  FINDINGS:  ALIGNMENT: The cervical lordosis is slightly reversed with minimal  retrolisthesis of C5 on C6. ATLANTOAXIAL: The odontoid and atlantoaxial joints are unremarkable.  VERTEBRAL BODIES: The C7 vertebral segment is partially obscured. There is no evidence of fracture or radiographically apparent osseous lesion. The vertebral body heights are preserved. Multilevel anterior osteophyte formation is demonstrated. DISC SPACES: Advanced intervertebral disc space narrowing is present at C4-C5 and C5-C6. PREVERTEBRAL SOFT TISSUES: Negative for swelling or radiopaque foreign body.  OBLIQUE VIEWS: Multilevel neuroforaminal stenoses are appreciated. OTHER:   Coarse calcification is present in the posterior neck soft tissues. Visualized portions of the lung apices are grossly clear.          CONCLUSION:  1. Substantial degenerative disc disease, particularly at C4-C5 and C5-C6.   2. Suspected neural foraminal compromise. Consider follow-up MRI for further assessment.   3. No radiographically visible acute osseous injury of the cervical spine.    Dictated by (CST): Beltran Bowers MD on 3/24/2025 at 12:37 PM     Finalized by (CST): Beltran Bowers MD on 3/24/2025 at 12:39 PM              Assessment    89-year-old gentleman status post bilateral embolic CVA with right-sided paresthesias/mild weakness expressive aphasia in setting of hypertension, hyperlipidemia, poorly controlled diabetes mellitus.  Non-STEMI.    Plan     Continue PT OT  Diet initiation as per speech  DM2-A1c 8.7.  On SSI, was on metformin at home.  Clarification of medication regime  Further workup as per respective services,   Establishment of stroke prophylaxis, anticoagulation  Cardiac workup underway    Despite concerns for patient's safety to return home he is doing relatively well and is still an appropriate candidate for acute inpatient rehabilitation.  He will most likely require additional care after his rehabilitation given his advanced age, and functional decline.    Thank you for this consultation allowing me to  participate in this patient's care      SIRIA HARRISON MD    4/15/2025    9:05 AM         [1]   Medications Prior to Admission   Medication Sig Dispense Refill Last Dose/Taking    amLODIPine 10 MG Oral Tab Take 1 tablet (10 mg total) by mouth daily. 90 tablet 3 2025 Morning    metFORMIN 850 MG Oral Tab Take 1 tablet (850 mg total) by mouth 2 (two) times daily with meals. 180 tablet 3 2025 Morning    Cholecalciferol (VITAMIN D3) 25 MCG (1000 UT) Oral Cap Take 1 tablet by mouth in the morning.   2025 Morning    ezetimibe 10 MG Oral Tab Take 1 tablet (10 mg total) by mouth daily. 90 tablet 3 2025 Morning    Multiple Vitamins-Minerals (EYE-VITES) Oral Tab Take by mouth in the morning and before bedtime.   2025 Morning    B Complex-C-Folic Acid Oral Tab Take by mouth.   2025 Morning    [] nitrofurantoin monohydrate macro 100 MG Oral Cap Take 1 capsule (100 mg total) by mouth 2 (two) times daily for 5 days. 10 capsule 0     [] sodium polystyrene sulfonate 15 GM/60ML Combination Suspension Take 60 mL (15 g total) by mouth once for 1 dose. 60 mL 0     clobetasol 0.05 % External Cream Use bid to red itchy areas on chest arms neck 45 g 0 Unknown   [2]   Allergies  Allergen Reactions    Lisinopril HIVES   [3]   Past Medical History:   Arthritis    Essential hypertension    Hyperlipidemia    Prediabetes   [4]   Past Surgical History:  Procedure Laterality Date    Hernia surgery      Other      bilateral cataract    Tonsillectomy     [5]   Family History  Problem Relation Age of Onset    Heart Disorder Maternal Grandmother     Diabetes Mother

## 2025-04-15 NOTE — PAYOR COMM NOTE
--------------  CONTINUED STAY REVIEW    Payor: [employer]  Subscriber #:  [id number]  Authorization Number: [id number]    Admit date: 4/13/25  Admit time:  6:06 PM      4/15/25      Subjective:   Sarwat Townsend was sob this morning. IV fluids stopped CXR reviewed.      Objective:   Blood pressure 145/69, pulse 79, temperature 98.4 °F (36.9 °C), temperature source Oral, resp. rate 20, weight 101 lb 1.6 oz (45.9 kg), SpO2 91%.     Respiratory: Clear to auscultation bilaterally. No wheezes. No rhonchi.  Cardiovascular: S1, S2. Regular rate and rhythm. No murmurs, rubs or gallops.   Abdomen: Soft, nontender, nondistended.  Positive bowel sounds. No rebound or guarding.  Neurologic: No focal neurological deficits.   Musculoskeletal: Moves all extremities.  Extremities: No edema.     Lab Results   Component Value Date     WBC 7.7 04/14/2025     HGB 9.6 (L) 04/14/2025     HCT 28.7 (L) 04/14/2025     .0 04/14/2025     CREATSERUM 0.68 (L) 04/14/2025     BUN 22 04/14/2025      04/14/2025     K 4.0 04/15/2025      04/14/2025     CO2 21.0 04/14/2025      (H) 04/14/2025     CA 8.7 04/14/2025     ALB 4.0 04/13/2025     ALKPHO 223 (H) 04/13/2025     BILT 0.7 04/13/2025     TP 7.1 04/13/2025     AST 57 (H) 04/13/2025     ALT 52 (H) 04/13/2025     TSH 1.264 04/14/2025     MG 1.9 04/14/2025       CT BRAIN OR HEAD (CPT=70450)  Result Date: 4/15/2025  CONCLUSION:            1. Evolving bilateral occipital and cerebellar infarcts. Additional lacunar infarcts delineated by MRI are not well depicted by CT assessment. No new acute intracranial process by noncontrast CT technique.  2. Senescent changes of parenchymal volume loss with sequela of chronic microvascular ischemic disease.  3. There is extensive large vessel atherosclerosis involving the anterior and posterior intracranial circulations.  4. Lesser incidental findings as above.       Dictated by (CST): Beltran Bowers MD on 4/15/2025 at 10:09 AM      Finalized by (CST): Beltran Bowers MD on 4/15/2025 at 10:14 AM           MRI BRAIN (LTH=79029)  Result Date: 4/14/2025  CONCLUSION:            1. Multiple bilateral acute cerebral infarcts with largest in left greater than right occipital lobe, but also involving the cortex and subcortical regions of the frontal and parietal lobes. 2. Multiple acute bilateral cerebellar infarcts. 3. Acute left thalamic lacunar infarct. 4. Changes of chronic small vessel disease in cerebral white matter. 5. No hemorrhage.    Dictated by (CST): Rocael Bruner MD on 4/14/2025 at 4:20 PM     Finalized by (CST): Rocael Bruner MD on 4/14/2025 at 4:28 PM           CTA BRAIN + CTA CAROTIDS (CPT=70496/43957)  Result Date: 4/13/2025  CONCLUSION:            1. No major vessel occlusion, hemodynamically significant stenosis, dissection, AVM or aneurysm.    Dictated by (CST): Rocael Bruner MD on 4/13/2025 at 6:45 PM     Finalized by (CST): Rocael Bruner MD on 4/13/2025 at 6:57 PM           CT BRAIN OR HEAD (CPT=70450)  Result Date: 4/13/2025  CONCLUSION:            1. Subacute/recent left occipital cortical infarct. 2. Small probably chronic right occipital cortical infarct. 3. Old left cerebellar infarct. 4. Changes of chronic small vessel disease in cerebral white matter. 5. Large vessel intracranial atherosclerosis.    Dictated by (CST): Rocael Bruner MD on 4/13/2025 at 3:37 PM     Finalized by (CST): Rocael Bruner MD on 4/13/2025 at 3:42 PM           EKG 12 Lead  Result Date: 4/14/2025  Atrial fibrillation Right bundle branch block Abnormal ECG When compared with ECG of 13-APR-2025 15:05, Atrial fibrillation has replaced Sinus rhythm Confirmed by MARIA M WRIGHT, PARUL ([id number]) on 4/14/2025 11:02:51 AM     EKG 12 Lead  Result Date: 4/13/2025  Normal sinus rhythm Right atrial enlargement Right bundle branch block Abnormal ECG No previous ECGs found in [facility]     [Scheduled Medications]   insulin aspart  1-7 Units Subcutaneous TID CC    heparin   5,000 Units Subcutaneous Q8H [facility]    aspirin  81 mg Oral Daily    atorvastatin  40 mg Oral Nightly        Assessment and Plan:       Sarwat Townsend is a 89 year old male with a history of HTN, HLD and DM2, who presents with right-sided weakness, blurry vision, slurred speech and shortness of breath for 2 days.      # Subacute ischemic stroke  - CT head showed subacute left occipital cortical infarct, small chronic right occipital cortical infarct, and old left cerebellar infarct. MRI pending   - apprec neuro consult. Hold heparin for 2 weeks for now   - Aspirin 324 mg given, continue aspirin 81 mg daily and atorvastatin 40 mg   - Patient failed bedside speech eval  - PT, OT, SLP  - TSH, A1c, LDL - 8.7 A1c, ldl 55, tsh normal   - PT/OT saw patient- reccomending acute rehab      # NSTEMI  - troponin 3741, no acute ischemic changes on ECG.   - apprec cards consult   - echo pending      SOB  - CXR shows possible fluid overload versus pna  - will start IV zosyn 3.375 grams q 8 hours   - now on 3 liters.     # Hypertension  - soft BP, hold home amlodipine     # DM2 - on metformin 850 bid at home   - SSI Q6H  change to ac and hs   - A1c 8.7   - diet education      # Hyponatremia  - Na 133  - NS infusion  - BMP in AM     [place]ion - dietician consulted      Diet: general diet   PT/OT: consulted  DVT ppx: heparin                      NEUROLOGY  INTERVAL HPI:   - Due to intermittent right hand numbness and weakness, repeat CT brain was done.     ASSESSMENT:  The patient is a 89 year old man with past medical history of type 2 diabetes, hypertension, hyperlipidemia who presents with 2 days of right hemiparesis, dysarthria, blurry vision and dyspnea.  Did not seek medical attention until about 2 days of past.  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  Found to have significantly elevated troponin by cardiology suspecting type II demand ischemia.  Also  found to have new onset atrial fibrillation.  -LDL 55 and hemoglobin A1c 8.7  -Echocardiogram EF 50%, normal left atrial size     Multiple acute embolic appearing ischemic strokes likely cardioembolic from new onset atrial fibrillation.  Strokes are seen in bilateral occipital lobes right greater than left, bilateral frontal and parietal lobes, bilateral cerebellar hemispheres.  Right hand numbness and right leg weakness fluctuating, does localize to stroke bed.  No new findings on CT brain.  -Patient is not a candidate for thrombolysis or thrombectomy as he presented out of the window     - Continue aspirin 81 mg daily monotherapy.  Recommend changing to anticoagulation in 2 weeks due to size and number of ischemic strokes.  Due to fall risk, could do anticoagulation monotherapy despite PCA stenoses seen on CTA head and neck.  -Continue Atorvastatin 40 mg daily   -PT, OT and speech therapy   -Blood pressure goals: Normotensive, no more than 25% drop per day  -Cannot live alone after this stroke due to right hemiparesis, right homonymous hemianopia, expressive aphasia        MEDICATIONS ADMINISTERED IN LAST 1 DAY:  aspirin chewable tab 81 mg       Date Action Dose Route User    4/15/2025 0933 Given 81 mg Oral Larisa Arnold RN          atorvastatin (Lipitor) tab 40 mg       Date Action Dose Route User    4/14/2025 2011 Given 40 mg Oral David Driscoll RN          furosemide (Lasix) 10 mg/mL injection 20 mg       Date Action Dose Route User    4/15/2025 1129 Given 20 mg Intravenous Larisa Arnold RN          heparin (Porcine) 5000 UNIT/ML injection 5,000 Units       Date Action Dose Route User    4/15/2025 1418 Given 5,000 Units Subcutaneous (Right Lower Abdomen) Larisa Arnold RN    4/15/2025 0549 Given 5,000 Units Subcutaneous (Left Lower Abdomen) David Driscoll RN    4/14/2025 2011 Given 5,000 Units Subcutaneous (Left Lower Abdomen) David Driscoll RN          insulin aspart (NovoLOG) 100  Units/mL FlexPen 1-7 Units       Date Action Dose Route User    4/15/2025 0812 Given 4 Units Subcutaneous (Right Upper Arm) Larisa Arnold, RN          sodium chloride 0.9% infusion       Date Action Dose Route User    4/15/2025 0003 New Bag (none) Intravenous David Driscoll, RN            Vitals (last day)       Date/Time Temp Pulse Resp BP SpO2 Weight O2 Device O2 Flow Rate (L/min) [facility]    04/15/25 1600 97.8 °F (36.6 °C) 85 18 145/71 97 % -- Nasal cannula 3 L/min BP    04/15/25 1225 98.4 °F (36.9 °C) -- 18 135/68 97 % -- Nasal cannula 3 L/min     04/15/25 1126 -- -- 20 145/69 -- -- Nasal cannula 3 L/min     04/15/25 1100 -- -- -- 145/69 -- -- -- --     04/15/25 1059 -- -- -- 145/69 -- -- -- --     04/15/25 0858 -- -- -- -- 91 % -- None (Room air) -- BP    04/15/25 0807 98.4 °F (36.9 °C) 88 18 138/72 -- -- -- --     04/15/25 0400 98.2 °F (36.8 °C) 79 18 132/71 94 % 101 lb 1.6 oz (45.9 kg) None (Room air) --     04/15/25 0000 98.2 °F (36.8 °C) 93 18 116/86 94 % -- None (Room air) --     04/14/25 2000 97.9 °F (36.6 °C) 77 18 121/66 95 % -- None (Room air) --     04/14/25 1130 -- 100 -- -- -- -- -- -- RW    04/14/25 0907 -- 101 -- 131/95 -- -- -- -- GS    04/14/25 0901 -- 101 -- 131/95 -- -- -- --     04/14/25 0800 98.3 °F (36.8 °C) 81 18 104/69 95 % -- None (Room air) -- BB    04/14/25 0607 97.8 °F (36.6 °C) 90 18 114/60 97 % -- None (Room air) -- JH    04/14/25 0533 -- -- -- -- -- 100 lb 6.4 oz (45.5 kg) -- -- ZA

## 2025-04-15 NOTE — PROGRESS NOTES
Progress Note  Sarwat Townsend Patient Status:  Inpatient    1932 MRN [id number]   Location [facility] 3W/SW Attending Vee Tobias MD   Hosp Day # 2 PCP Kamlesh Durant MD     Subjective:  Feels sob, denies further complaints    Objective:  /68 (BP Location: Right arm)   Pulse 79   Temp 98.4 °F (36.9 °C) (Oral)   Resp 18   Wt 101 lb 1.6 oz (45.9 kg)   SpO2 97%   BMI 16.32 kg/m²     Telemetry: SR, rare PACs    Intake/Output:    Intake/Output Summary (Last 24 hours) at 4/15/2025 1532  Last data filed at 4/15/2025 1400  Gross per 24 hour   Intake 595 ml   Output 2150 ml   Net -1555 ml     Last 3 Weights   04/15/25 0400 101 lb 1.6 oz (45.9 kg)   25 0533 100 lb 6.4 oz (45.5 kg)   25 1811 102 lb (46.3 kg)   25 1049 112 lb (50.8 kg)   25 1107 112 lb (50.8 kg)     Labs:  Recent Labs   Lab 25  1504 25  0651 04/15/25  0616   * 117*  --    BUN 28* 22  --    CREATSERUM 1.00 0.68*  --    EGFRCR 71 87  --    CA 9.1 8.7  --    * 137  --    K 4.1 3.6 4.0   CL 97* 102  --    CO2 26.0 21.0  --      Recent Labs   Lab 25  1503 25  0651   RBC 3.32* 3.08*   HGB 10.6* 9.6*   HCT 30.2* 28.7*   MCV 91.0 93.2   MCH 31.9 31.2   MCHC 35.1 33.4   RDW 13.6 13.6   NEPRELIM 8.23*  --    WBC 10.4 7.7   .0 181.0     Recent Labs   Lab 25  0732 25  1201 25  1732   TROPHS 4,377* 3,767* 3,188*     Lab Results   Component Value Date/Time    HDL 74 (H) 2025 03:04 PM    LDL 55 2025 03:04 PM    TRIG 68 2025 03:04 PM     No results found for: \"DDIMER\"  Lab Results   Component Value Date    TSH 1.264 2025     Review of Systems:   Constitutional: No fevers, chills, fatigue or night sweats.  Respiratory: Denies cough, wheezing or shortness of breath.  CV: Denies chest pain, palpitations, orthopnea, PND or dizziness.  GI: No nausea, vomiting or diarrhea. No blood in stools.    Physical Exam:   General: Alert, cooperative, no  distress, appears stated age.  Neck: no JVD.  Lungs: rhonchi bilaterally.  Chest wall: No tenderness or deformity.  Heart: Regular rate and rhythm, S1, S2 normal, no murmur, click, rub or gallop.  Abdomen: Soft, non-tender. Bowel sounds normal. No masses,  No organomegaly.  Extremities: Extremities normal, atraumatic, no cyanosis, mild BLE edema.  Pulses: 2+ and symmetric all extremities.  Neurologic: Grossly intact.    Diagnostics:  XR CHEST AP PORTABLE  (CPT=71045)  Result Date: 4/15/2025  CONCLUSION:   Diffusely increased interstitial and alveolar markings bilaterally, which may be secondary to interstitial and alveolar edema with or without superimposed pneumonia in the appropriate clinical setting.  Suspected trace bilateral pleural effusions.    Dictated by (CST): Bhumi Maciel MD on 4/15/2025 at 1:34 PM     Finalized by (CST): Bhumi Maciel MD on 4/15/2025 at 1:35 PM          CT BRAIN OR HEAD (CPT=70450)  Result Date: 4/15/2025  CONCLUSION:  1. Evolving bilateral occipital and cerebellar infarcts. Additional lacunar infarcts delineated by MRI are not well depicted by CT assessment. No new acute intracranial process by noncontrast CT technique.  2. Senescent changes of parenchymal volume loss with sequela of chronic microvascular ischemic disease.  3. There is extensive large vessel atherosclerosis involving the anterior and posterior intracranial circulations.  4. Lesser incidental findings as above.       Dictated by (CST): Beltran Bowers MD on 4/15/2025 at 10:09 AM     Finalized by (CST): Beltran Bowers MD on 4/15/2025 at 10:14 AM          MRI BRAIN (LOO=49741)  Result Date: 4/14/2025  CONCLUSION:  1. Multiple bilateral acute cerebral infarcts with largest in left greater than right occipital lobe, but also involving the cortex and subcortical regions of the frontal and parietal lobes. 2. Multiple acute bilateral cerebellar infarcts. 3. Acute left thalamic lacunar infarct. 4. Changes of chronic  small vessel disease in cerebral white matter. 5. No hemorrhage.    Dictated by (CST): Rocael Bruner MD on 4/14/2025 at 4:20 PM     Finalized by (CST): Rocael Bruner MD on 4/14/2025 at 4:28 PM          Medications:  Scheduled Medications[1]  Medication Infusions[2]    Assessment:    Acute CVA   Presented with aphasia, right sided weakness & head CT w/ subacute L occipital cortical infarct along with prior cerebellar infarcts and chronic small vessel changes with large vessel atherosclerosis  -asa, statin  -neuro following, brain MRI pending     Elevated troponin (suspect type 2 demand)   Peak TnI 4387, now w/ slight downtrend   - no acute angina, EKG w/ no acute ischemic changes. No heparin at this time   - suspect secondary to acute neurological event    - echocardiogram revealed normal LV function 50% with no RWMA   - continue medical management w/ consideration for eventual ischemic evaluation   -on asa, statin     Atrial fibrillation, new onset   - converted to new afib 4/14; rate controlled,now back in sinus rhythm with PACs  - [id number] = 6 (age, htn, cva, dm)  -LV function preserved  -not on AVN agents or AAD, will start low dose toprol      HTN   BP 130s-140s, home meds on hold  -resume home amlodipine     HLD  - risk factor modification, on atorvastatin & zetia at home     T2DM   - On insulin per PMD     Acute hypoxia  Now on 3L O2, CXR concerning for pneumonia  -abx per primary      Plan:  -start toprol 25mg daily  -Resume home amlodipine 10mg daily, monitor BP  -continue asa, statin  -further recs per primary      Plan of care discussed with patient, RN.        Ruby Bateman, MSN, APN, FNP-BC, CCK  4/15/2025  3:32 PM  [phone] [place]  [phone] [place]      ===============================================  Seen/examined patient independently.  Agree with findings, assessment and plan as outlined above.   Was noted to have labored breathing and hypoxia with minimal activity.  CXR with  interstitial/alveolar edema +/- superimposed pneumonia.  Patient was given IV Lasix 20 x 1 with significant UOP >2L and started on Abx. Pt was re-seen this afternoon with significant improvement in O2 saturations and breathing - now stating he is back to his baseline.   Will repeat a limited TTE to further assess, Plan on ischemic evaluation with stress MPI for risk stratification - no AC thus this precludes LHC per neuro for at least 4 weeks post CVA.  I have personally performed the medical decision making in its entirety.   Gerald Venegas DO           [1]    insulin aspart  1-7 Units Subcutaneous TID CC    heparin  5,000 Units Subcutaneous Q8H [facility]    aspirin  81 mg Oral Daily    atorvastatin  40 mg Oral Nightly   [2]

## 2025-04-15 NOTE — PROGRESS NOTES
[facility] [address], [address]  [facility] [address]  [phone]          INPATIENT NEUROLOGY   FOLLOW UP CONSULT NOTE       [facility]  [facility] of [facility]    Report of Consultation    Sarwat Townsend Patient Status:  Inpatient     1932 MRN [id number]    Location [facility] 3W/SW Attending Vee Tobias MD    Hosp Day # 2 PCP Kamlesh Durant MD      Date of Admission:  2025  Date of Consult Follow Up:  4/15/2025        INTERVAL HPI:   - Due to intermittent right hand numbness and weakness, repeat CT brain was done.    -Says he feels cold.  Better when I helped cover him in a sheet.      ?PHYSICAL EXAM:   /68 (BP Location: Right arm)   Pulse 79   Temp 98.4 °F (36.9 °C) (Oral)   Resp 18   Wt 101 lb 1.6 oz (45.9 kg)   SpO2 97%   BMI 16.32 kg/m²   General appearance: Well appearing, and in no acute distress  Skin: skin color, texture normal.  No rashes or lesions.    Head: Normocephalic, atraumatic.    Neurological exam:  Awake, alert, mixed aphasia expressive > receptive  Right UE/LE 4/5, left UE/LE 5-/5  Right UE and LE reduced sensation to light touch       LABS/DATA:    Lab Results   Component Value Date    K 4.0 04/15/2025       HGBA1C:    Lab Results   Component Value Date    A1C 8.7 (H) 2025    A1C 8.7 (H) 2025    A1C 7.7 (H) 2024     (H) 2025              IMAGING:  CT BRAIN OR HEAD (CPT=70450)  Result Date: 4/15/2025  CONCLUSION:  1. Evolving bilateral occipital and cerebellar infarcts. Additional lacunar infarcts delineated by MRI are not well depicted by CT assessment. No new acute intracranial process by noncontrast CT technique.  2. Senescent changes of parenchymal volume loss with sequela of chronic microvascular ischemic disease.  3. There is extensive large vessel atherosclerosis involving the anterior and posterior intracranial circulations.  4. Lesser incidental findings as above.        Dictated by (CST): Beltran Bowers MD on 4/15/2025 at 10:09 AM     Finalized by (CST): Beltran Bowers MD on 4/15/2025 at 10:14 AM          MRI BRAIN (FHE=[id number])  Result Date: 4/14/2025  CONCLUSION:  1. Multiple bilateral acute cerebral infarcts with largest in left greater than right occipital lobe, but also involving the cortex and subcortical regions of the frontal and parietal lobes. 2. Multiple acute bilateral cerebellar infarcts. 3. Acute left thalamic lacunar infarct. 4. Changes of chronic small vessel disease in cerebral white matter. 5. No hemorrhage.    Dictated by (CST): Rocael Bruner MD on 4/14/2025 at 4:20 PM     Finalized by (CST): Rocael Bruner MD on 4/14/2025 at 4:28 PM           I PERSONALLY REVIEWED THESE IMAGES.     ASSESSMENT:  The patient is a 89 year old man with past medical history of type 2 diabetes, hypertension, hyperlipidemia who presents with 2 days of right hemiparesis, dysarthria, blurry vision and dyspnea.  Did not seek medical attention until about 2 days of past.  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  Found to have significantly elevated troponin by cardiology suspecting type II demand ischemia.  Also found to have new onset atrial fibrillation.  -LDL 55 and hemoglobin A1c 8.7  -Echocardiogram EF 50%, normal left atrial size     Multiple acute embolic appearing ischemic strokes likely cardioembolic from new onset atrial fibrillation.  Strokes are seen in bilateral occipital lobes right greater than left, bilateral frontal and parietal lobes, bilateral cerebellar hemispheres.  Right hand numbness and right leg weakness fluctuating, does localize to stroke bed.  No new findings on CT brain.  -Patient is not a candidate for thrombolysis or thrombectomy as he presented out of the window     - Continue aspirin 81 mg daily monotherapy.  Recommend changing to anticoagulation in 2 weeks due to size and number of  ischemic strokes.  Due to fall risk, could do anticoagulation monotherapy despite PCA stenoses seen on CTA head and neck.  -Continue Atorvastatin 40 mg daily   -PT, OT and speech therapy   -Blood pressure goals: Normotensive, no more than 25% drop per day  -Cannot live alone after this stroke due to right hemiparesis, right homonymous hemianopia, expressive aphasia      STROKE RISK FACTORS:   *Hypertension - Target blood pressure <140/90, <130/85 for high risk, normal 120/80  *Physical inactivity - target exercise at least 3 times per week  *Obesity - target ideal body weight and girth <35\" for women, <40\" for men  *Diabetes - Target <6.5-7%   *Smoking - Target smoking cessation  *Hyperlipidemia - Target total cholesterol < 200, Target LDL <100, < 70 for high risk, Target HDL >45 for men, >55 for women, Target triglycerides <150     No further inpatient neurological testing needed.  Follow up in 1 month.  Please call w/ further questions.        This note was prepared using Dragon [employer] voice recognition dictation software and as a result, errors may occur. When identified, these errors have been corrected. While every attempt is made to correct errors during dictation, discrepancies may still exist    BETTY Cordova DO   Staff Neurologist   4/15/2025  12:36 PM

## 2025-04-15 NOTE — OCCUPATIONAL THERAPY NOTE
OCCUPATIONAL THERAPY TREATMENT NOTE - INPATIENT        Room Number: 313/313-A          Problem List  Principal Problem:    Cerebrovascular accident (CVA) ([facility])  Active Problems:    NSTEMI (non-ST elevated myocardial infarction) ([facility])    Occipital stroke ([facility])      OCCUPATIONAL THERAPY ASSESSMENT   Patient demonstrates limited progress this session, goals remain in progress.    Patient is requiring  mod assist for bed mobility, transfers, and mobility; up to max assist for ADLs  as a result of the following impairments: decreased functional strength, decreased functional reach, decreased endurance, impaired sitting/standing balance, impaired coordination, impaired motor planning, decreased muscular endurance, medical status, increased O2 needs from baseline, and visual deficits.    Patient continues to function below baseline with bed mobility, transfers, mobility, and ADL tasks.  Next session anticipate patient to progress toileting, bathing, lower body dressing, bed mobility, and transfers.  Occupational Therapy will continue to follow patient for duration of hospitalization.    Patient continues to benefit from continued skilled OT services: to facilitate return to prior level of function as patient demonstrates high motivation with excellent tolerance to an intensive therapy program.     PLAN DURING HOSPITALIZATION  OT Device Recommendations: None  OT Treatment Plan: Balance activities, Energy conservation/work simplification techniques, ADL training, Functional transfer training, Endurance training, Patient/Family education, Patient/Family training, Compensatory technique education, Fine motor coordination activities     SUBJECTIVE  \"Oh yea, ok\" -repeated constantly throughout session when not answering questions posed by therapists     OBJECTIVE  Precautions: HOB elevated 30 degrees, Cardiac, Limb alert - right, Limb alert - left, Bed/chair alarm, Hard of hearing       PAIN ASSESSMENT  Ratin    ACTIVITY  TOLERANCE           BP: 145/69  BP Location: Right arm  BP Method: Automatic  Patient Position: Sitting    O2 SATURATIONS  Oxygen Therapy  SPO2% on Room Air at Rest: 86  SPO2% on Oxygen at Rest: 95  At rest oxygen flow (liters per minute): 3  SPO2% Ambulation on Oxygen: 94  Ambulation oxygen flow (liters per minute): 3    ACTIVITIES OF DAILY LIVING ASSESSMENT  AM-PAC ‘6-Clicks’ Inpatient Daily Activity Short Form  How much help from another person does the patient currently need…  -   Putting on and taking off regular lower body clothing?: A Lot  -   Bathing (including washing, rinsing, drying)?: A Lot  -   Toileting, which includes using toilet, bedpan or urinal? : A Lot  -   Putting on and taking off regular upper body clothing?: A Lot  -   Taking care of personal grooming such as brushing teeth?: A Little  -   Eating meals?: A Little    AM-PAC Score:  Score: 14  Approx Degree of Impairment: 59.67%  Standardized Score (AM-PAC Scale): 33.39  CMS Modifier (G-Code): CK    FUNCTIONAL TRANSFER ASSESSMENT  Sit to Stand: Edge of Bed  Edge of Bed: Moderate Assist  Chair: Moderate Assist  Simulated commode transfer: Mod stand-pivot transfer to rolling chair     BED MOBILITY  Supine to Sit : Moderate Assist    BALANCE ASSESSMENT  Static Sitting: Stand-by Assist  Static Standing: Minimal Assist  Dynamic sitting: CGA  Dynamic Standing: Mod assist      FUNCTIONAL ADL ASSESSMENT  LB Dressing Standing: Maximum Assist  Toileting Standing: Minimal Assist (for thoroughness of bowel lv cares)    Skilled Therapy Provided:   RN cleared pt for participation. Session coordinated with PT. Pt received in bed with no visitors present. Pt agreeable to participation in therapy. Pt required mod assist to complete supine to sit transition. Once seated EOB, pt demo increased work of breathing and wheezing noted; vitals monitored and oxygen at 86% on RA; unable to improve with ~2 min seated rest with pursed lip breathing. RN notified. Pt put  initially on 2L NC; unable to recover to above 92%. Per RN, pt's goal is to sustain 94% or greater. Oxygen increased to 3L NC; pt sustaining on 96% at rest. Pt requesting to use bathroom; required mod assist to complete stand-pivot to rolling chair. Pt able to complete partial bowel lv cares from seated; pt demo impaired coordination when completing task using RUE (dropping wipe, difficulty coordinating RUE to take wipe from therapist). Pt required min assist for bowel care thoroughness in standing. Pt required max assist to manage brief in standing due to decreased standing balance and RUE coordination. Pt completed bathroom to recliner distance mobility with mod assist; required almost constant verbal cues to maintain upright posture and keep eyes up to navigate space. Vitals monitored and reported above. Pt remained in recliner with all needs in reach and alarm on at end of session. RN aware.       EDUCATION PROVIDED  Patient Education : Role of Occupational Therapy; Plan of Care; Functional Transfer Techniques; Fall Prevention; Posture/Positioning; Energy Conservation; Proper Body Mechanics  Patient's Response to Education: Verbalized Understanding; Returned Demonstration    The patient's Approx Degree of Impairment: 59.67% has been calculated based on documentation in the [facility] '6 clicks' Inpatient Daily Activity Short Form.  Research supports that patients with this level of impairment may benefit from rehab.  Final disposition will be made by interdisciplinary medical team.    Patient End of Session: Up in chair, Needs met, Call light within reach, With  staff, RN aware of session/findings, All patient questions and concerns addressed, Hospital anti-slip socks, Alarm set    OT Goals:  Patients self stated goal is: none stated      Patient will complete functional transfer with SUP  Comment: ongoing-mod     Patient will complete toileting with SUP  Comment: ongoing- min assist     Patient will tolerate  standing for 5 minutes in prep for adls with SUP   Comment: na     Patient will complete item retrieval with SUP  Comment: elana           Goals  on: 25  Frequency: 3-5x/week  OT Session Time: 23 minutes  Self-Care Home Management: 23 minutes

## 2025-04-15 NOTE — PROGRESS NOTES
[facility]  part of [facility]    Progress Note    Sarwat Townsend Patient Status:  Inpatient    1932 MRN [id number]   Location [facility] 3W/SW Attending Vee Tobias MD   Hosp Day # 2 PCP Kamlesh Durant MD       Subjective:   Sarwat Townsend was sob this morning. IV fluids stopped CXR reviewed.     Objective:   Blood pressure 145/69, pulse 79, temperature 98.4 °F (36.9 °C), temperature source Oral, resp. rate 20, weight 101 lb 1.6 oz (45.9 kg), SpO2 91%.    Physical Exam:    General: No acute distress.   Respiratory: Clear to auscultation bilaterally. No wheezes. No rhonchi.  Cardiovascular: S1, S2. Regular rate and rhythm. No murmurs, rubs or gallops.   Abdomen: Soft, nontender, nondistended.  Positive bowel sounds. No rebound or guarding.  Neurologic: No focal neurological deficits.   Musculoskeletal: Moves all extremities.  Extremities: No edema.    Results:     Lab Results   Component Value Date    WBC 7.7 2025    HGB 9.6 (L) 2025    HCT 28.7 (L) 2025    .0 2025    CREATSERUM 0.68 (L) 2025    BUN 22 2025     2025    K 4.0 04/15/2025     2025    CO2 21.0 2025     (H) 2025    CA 8.7 2025    ALB 4.0 2025    ALKPHO 223 (H) 2025    BILT 0.7 2025    TP 7.1 2025    AST 57 (H) 2025    ALT 52 (H) 2025    TSH 1.264 2025    MG 1.9 2025       CT BRAIN OR HEAD (CPT=70450)  Result Date: 4/15/2025  CONCLUSION:  1. Evolving bilateral occipital and cerebellar infarcts. Additional lacunar infarcts delineated by MRI are not well depicted by CT assessment. No new acute intracranial process by noncontrast CT technique.  2. Senescent changes of parenchymal volume loss with sequela of chronic microvascular ischemic disease.  3. There is extensive large vessel atherosclerosis involving the anterior and posterior intracranial circulations.  4. Lesser incidental  findings as above.       Dictated by (CST): Beltran Bowers MD on 4/15/2025 at 10:09 AM     Finalized by (CST): Beltran Bowers MD on 4/15/2025 at 10:14 AM          MRI BRAIN (AON=66266)  Result Date: 4/14/2025  CONCLUSION:  1. Multiple bilateral acute cerebral infarcts with largest in left greater than right occipital lobe, but also involving the cortex and subcortical regions of the frontal and parietal lobes. 2. Multiple acute bilateral cerebellar infarcts. 3. Acute left thalamic lacunar infarct. 4. Changes of chronic small vessel disease in cerebral white matter. 5. No hemorrhage.    Dictated by (CST): Rocael Bruner MD on 4/14/2025 at 4:20 PM     Finalized by (CST): Rocael Bruner MD on 4/14/2025 at 4:28 PM          CTA BRAIN + CTA CAROTIDS (CPT=70496/01849)  Result Date: 4/13/2025  CONCLUSION:  1. No major vessel occlusion, hemodynamically significant stenosis, dissection, AVM or aneurysm.    Dictated by (CST): Rocael Bruner MD on 4/13/2025 at 6:45 PM     Finalized by (CST): Rocael Bruner MD on 4/13/2025 at 6:57 PM          CT BRAIN OR HEAD (CPT=70450)  Result Date: 4/13/2025  CONCLUSION:  1. Subacute/recent left occipital cortical infarct. 2. Small probably chronic right occipital cortical infarct. 3. Old left cerebellar infarct. 4. Changes of chronic small vessel disease in cerebral white matter. 5. Large vessel intracranial atherosclerosis.    Dictated by (CST): Rocael Bruner MD on 4/13/2025 at 3:37 PM     Finalized by (CST): Rocael Bruner MD on 4/13/2025 at 3:42 PM          EKG 12 Lead  Result Date: 4/14/2025  Atrial fibrillation Right bundle branch block Abnormal ECG When compared with ECG of 13-APR-2025 15:05, Atrial fibrillation has replaced Sinus rhythm Confirmed by MARIA M WRIGHT, PARUL ([id number]) on 4/14/2025 11:02:51 AM    EKG 12 Lead  Result Date: 4/13/2025  Normal sinus rhythm Right atrial enlargement Right bundle branch block Abnormal ECG No previous ECGs found in [facility]    Scheduled Medications[1]  PRN  Medications[2]      Assessment and Plan:       Sarwat Townsend is a 89 year old male with a history of HTN, HLD and DM2, who presents with right-sided weakness, blurry vision, slurred speech and shortness of breath for 2 days.      # Subacute ischemic stroke  - CT head showed subacute left occipital cortical infarct, small chronic right occipital cortical infarct, and old left cerebellar infarct. MRI pending   - apprec neuro consult. Hold heparin for 2 weeks for now   - Aspirin 324 mg given, continue aspirin 81 mg daily and atorvastatin 40 mg   - Patient failed bedside speech eval  - PT, OT, SLP  - TSH, A1c, LDL - 8.7 A1c, ldl 55, tsh normal   - PT/OT saw patient- reccomending acute rehab      # NSTEMI  - troponin 3741, no acute ischemic changes on ECG.   - apprec cards consult   - echo pending     SOB  - CXR shows possible fluid overload versus pna  - will start IV zosyn 3.375 grams q 8 hours   - now on 3 liters.     # Hypertension  - soft BP, hold home amlodipine     # DM2 - on metformin 850 bid at home   - SSI Q6H  change to ac and hs   - A1c 8.7   - diet education      # Hyponatremia  - Na 133  - NS infusion  - BMP in AM     Malnutrion - dietician consulted      Diet: general diet   PT/OT: consulted  DVT ppx: heparin  Line: none  Code status: Full  Dispo: expect greater than 2 midnights     MDM: high PIERRE BEAR MD  04/15/25           [1]    insulin aspart  1-7 Units Subcutaneous TID CC    heparin  5,000 Units Subcutaneous Q8H [facility]    aspirin  81 mg Oral Daily    atorvastatin  40 mg Oral Nightly   [2]   acetaminophen **OR** acetaminophen    ondansetron    metoclopramide    acetaminophen    melatonin    polyethylene glycol (PEG 3350)    sennosides    bisacodyl    fleet enema    guaiFENesin    glucose **OR** glucose **OR** glucose-vitamin C **OR** dextrose **OR** glucose **OR** glucose **OR** glucose-vitamin C

## 2025-04-15 NOTE — SLP NOTE
SPEECH/LANGUAGE/COGNITIVE EVALUATION - INPATIENT    Admission Date: 4/13/2025  Evaluation Date: 04/15/25    Reason for Referral: Stroke protocol, RN dysphagia screen    ASSESSMENT & PLAN   ASSESSMENT & IMPRESSION  PPE REQUIRED. THIS THERAPIST WORE GLOVES, DROPLET MASK, AND GOGGLES FOR DURATION OF EVALUATION. HANDS WASHED UPON ENTRANCE/EXIT.    In regards to S/L cognitive skills, pt reports continued slurred speech. Oral agility and verbal agility tasks appear impaired per informal measures. The SLUMS was partially administered (pt with declining HERNANDO) in today's evaluation. Pt presents with impaired receptive/expressive language skills characterized by evidence of word finding across conversational tasks, impaired generative naming, and difficulty with problem solving. Pt accurately responded to y/n questions, follow 1 step commands, and oriented to person, time, place, and year. Pt exhibits impaired immediate memory and short term memory skills as evidenced by his ability to recall 2/5 items post 3 minutes. Clock drawing not completed as pt with declining HERNANDO and assessment discontinued. It should be noted, pt extremely [place] which impacted his ability to participate.     PLAN: SLP to target OME/DDK exercises, target cognitive-communication deficits, and continue assessments as HERNANDO improve.       Assessment(s) Administered: WAB Bedside Score, Perceptual Dysarthria Evaluation Rating, SLUMS  SLUMS: informal  WAB Bedside Score: 70  Perceptual Dysarthria Evaluation Rating: informal          Deficits Identified: Problem solving, Memory, Motor speech, Sequencing    Patient Experiencing Pain: No      GOALS  Goal #1 Pt will complete OME/DDK exercises with 80% accuracy and min-mod cues within 4 sessions.  In Progress   Goal #2 SLOB strategy to be introduced and utilized with 80% accuracy and min cues within 4 sessions.    In Progress   Goal #3 The patient will complete Confrontation, Responsve naming tasks with min-mod cues  with 80 % accuracy within   4 session(s).   In Progress   Goal #4 The patient will recall 3 out of 5 pieces of functional information with min-mod cues with 80% accuracy within 4 sessions.    In Progress   Goal #5 The patient will complete problem solving tasks with 80% accuracy within 4 sessions.    In Progress   Goal #6 Continue cognitive-communication assessments as HERNANDO increase.    In Progress     Prior Living Situation: Unknown  Prior Level of Function: Unknown      Imaging Results:     MRI BRAIN 4/14/25:  CONCLUSION:   1. Multiple bilateral acute cerebral infarcts with largest in left greater than right occipital lobe, but also involving the cortex and subcortical regions of the frontal and parietal lobes.   2. Multiple acute bilateral cerebellar infarcts.   3. Acute left thalamic lacunar infarct.   4. Changes of chronic small vessel disease in cerebral white matter.   5. No hemorrhage.            Dictated by (CST): Rocael Bruner MD on 4/14/2025 at 4:20 PM       Finalized by (CST): Rocael Bruner MD on 4/14/2025 at 4:28 PM     CT BRAIN 4/15/25:  CONCLUSION:   1. Evolving bilateral occipital and cerebellar infarcts. Additional lacunar infarcts delineated by MRI are not well depicted by CT assessment. No new acute intracranial process by noncontrast CT technique.      2. Senescent changes of parenchymal volume loss with sequela of chronic microvascular ischemic disease.      3. There is extensive large vessel atherosclerosis involving the anterior and posterior intracranial circulations.      4. Lesser incidental findings as above.             Dictated by (CST): Beltran Bowers MD on 4/15/2025 at 10:09 AM       Finalized by (CST): Beltran Bowers MD on 4/15/2025 at 10:14 AM       Patient/Family Goals: Slurred speech and difficulty word finding    Interdisciplinary Communication: Discussed with RN  Recommendations posted at bedside    Patient, family and/or caregiver has been informed and has taken part in this  evaluation and plan of treatment and have been advised and agree on the findings and goals.      FOLLOW UP  Treatment Plan/Recommendations: Aspiration precautions, Cognitive communication therapy, Dysarthria therapy  Duration: 1 week  Follow Up Needed (Documentation Required): Yes  SLP Follow-up Date: 04/16/25    Thank you for your referral.  If you have any questions please contact BUCK Mayes M.S. CCC-SLP  Speech Language Pathologist  Phone Number [phone]

## 2025-04-15 NOTE — PLAN OF CARE
Pt A/Ox3-4, One assist. IVF continued. Neuros Q4.     Problem: Patient Centered Care  Goal: Patient preferences are identified and integrated in the patient's plan of care  Description: Interventions:- What would you like us to know as we care for you? I would like to return home.- Provide timely, complete, and accurate information to patient/family- Incorporate patient and family knowledge, values, beliefs, and cultural backgrounds into the planning and delivery of care- Encourage patient/family to participate in care and decision-making at the level they choose- Honor patient and family perspectives and choices  Outcome: Progressing     Problem: Diabetes/Glucose Control  Goal: Glucose maintained within prescribed range  Description: INTERVENTIONS:- Monitor Blood Glucose as ordered- Assess for signs and symptoms of hyperglycemia and hypoglycemia- Administer ordered medications to maintain glucose within target range- Assess barriers to adequate nutritional intake and initiate nutrition consult as needed- Instruct patient on self management of diabetes  Outcome: Progressing     Problem: Patient/Family Goals  Goal: Patient/Family Long Term Goal  Description: Patient's Long Term Goal: Get stronger. Interventions:- Ambulate often. - See additional Care Plan goals for specific interventions  Outcome: Progressing  Goal: Patient/Family Short Term Goal  Description: Patient's Short Term Goal: Ask frequent questions. Interventions: - Work with staff on ways to improve overall health status. - See additional Care Plan goals for specific interventions  Outcome: Progressing     Problem: NEUROLOGICAL - ADULT  Goal: Achieves stable or improved neurological status  Description: INTERVENTIONS- Assess for and report changes in neurological status- Initiate measures to prevent increased intracranial pressure- Maintain blood pressure and fluid volume within ordered parameters to optimize cerebral perfusion and minimize risk of  hemorrhage- Monitor temperature, glucose, and sodium. Initiate appropriate interventions as ordered  Outcome: Progressing  Goal: Achieves maximal functionality and self care  Description: INTERVENTIONS- Monitor swallowing and airway patency with patient fatigue and changes in neurological status- Encourage and assist patient to increase activity and self care with guidance from PT/OT- Encourage visually impaired, hearing impaired and aphasic patients to use assistive/communication devices  Outcome: Progressing

## 2025-04-15 NOTE — PHYSICAL THERAPY NOTE
PHYSICAL THERAPY TREATMENT NOTE - INPATIENT     Room Number: 313/313-A       Presenting Problem: acute CVA, new onset a-fib;  right sided weakness, blurred vision and expressive aphasia, word finding difficulties  Co-Morbidities : HTN, HLD, DM2, prior CVA    Problem List  Principal Problem:    Cerebrovascular accident (CVA) ([facility])  Active Problems:    NSTEMI (non-ST elevated myocardial infarction) ([facility])    Occipital stroke ([facility])      PHYSICAL THERAPY ASSESSMENT   Patient demonstrates limited progress this session, goals  remain in progress. Increased O2 needs this date, discusses with medical team.       Patient is requiring moderate assist as a result of the following impairments: decreased functional strength, decreased endurance/aerobic capacity, and medical status.     Patient continues to function below baseline with bed mobility, transfers, and gait.  Next session anticipate patient to progress bed mobility, transfers, and gait.  Physical Therapy will continue to follow patient for duration of hospitalization.    Patient continues to benefit from continued skilled PT services: to facilitate return to prior level of function as patient demonstrates high motivation with excellent tolerance to an intensive therapy program .    PLAN DURING HOSPITALIZATION  Nursing Mobility Recommendation : 1 Assist  PT Device Recommendation: Rolling walker, Gait belt  PT Treatment Plan: Bed mobility, Coordination, Patient education, Gait training, Neuromuscular re-educate, Transfer training, Balance training, Stoop training  Frequency (Obs): Daily     SUBJECTIVE  \"Ok ok.\"     OBJECTIVE  Precautions: HOB elevated 30 degrees, Cardiac, Limb alert - right, Limb alert - left, Bed/chair alarm, Hard of hearing    WEIGHT BEARING RESTRICTION       PAIN ASSESSMENT   Rating: Other (Comment)  Location: no complaints  Management Techniques: Activity promotion    BALANCE  Static Sitting: Fair -  Dynamic Sitting: Poor +  Static Standing: Poor  +  Dynamic Standing: Poor -    ACTIVITY TOLERANCE           BP: 145/69  BP Location: Right arm  BP Method: Automatic  Patient Position: Sitting     O2 WALK  Oxygen Therapy  SPO2% on Room Air at Rest: 86  SPO2% on Oxygen at Rest: 95  At rest oxygen flow (liters per minute): 3  SPO2% Ambulation on Oxygen: 94  Ambulation oxygen flow (liters per minute): 3    AM-PAC '6-Clicks' INPATIENT SHORT FORM - BASIC MOBILITY  How much difficulty does the patient currently have...  Patient Difficulty: Turning over in bed (including adjusting bedclothes, sheets and blankets)?: A Little   Patient Difficulty: Sitting down on and standing up from a chair with arms (e.g., wheelchair, bedside commode, etc.): A Lot   Patient Difficulty: Moving from lying on back to sitting on the side of the bed?: A Lot   How much help from another person does the patient currently need...   Help from Another: Moving to and from a bed to a chair (including a wheelchair)?: A Lot   Help from Another: Need to walk in hospital room?: A Lot   Help from Another: Climbing 3-5 steps with a railing?: Total     AM-PAC Score:  Raw Score: 12   Approx Degree of Impairment: 68.66%   Standardized Score (AM-PAC Scale): 35.33   CMS Modifier (G-Code): CL    FUNCTIONAL ABILITY STATUS  Functional Mobility/Gait Assessment  Gait Assistance: Moderate assistance  Distance (ft): 25  Assistive Device: Rolling walker  Pattern: Shuffle, Ataxic  Rolling: moderate assist  Supine to Sit: moderate assist  Sit to Supine: maximum assist  Sit to Stand: minimal assist and moderate assist    The patient's Approx Degree of Impairment: 68.66% has been calculated based on documentation in the [facility] '6 clicks' Inpatient Daily Activity Short Form.  Research supports that patients with this level of impairment may benefit from JULIA.  Final disposition will be made by interdisciplinary medical team.    Patient End of Session: Up in chair, Needs met, Call light within reach, RN aware of  session/findings, All patient questions and concerns addressed, Hospital anti-slip socks, Alarm set, Discussed recommendations with /    CURRENT GOALS   Goals to be met by: 4/28/25  Patient Goal Patient's self-stated goal is: to get back to independent level   Goal #1 Patient is able to demonstrate supine - sit EOB @ level: minimum assistance      Goal #1   Current Status     Goal #2 Patient is able to demonstrate transfers Sit to/from Stand at assistance level: minimum assistance with walker - rolling      Goal #2  Current Status     Goal #3 Patient is able to ambulate 25 feet with assist device: walker - rolling at assistance level: minimum assistance   Goal #3   Current Status     Goal #4 Patient will negotiate one curb w/ assistive device and maximal assistance   Goal #4   Current Status     Goal #5 Patient to demonstrate independence with home activity/exercise instructions provided to patient in preparation for discharge.   Goal #5   Current Status     Goal #6 Patient to navigate bed to/from chair transfers with RW with minimal assistance   Goal #6  Current Status       Gait Training: 15 minutes  Therapeutic Activity: 14 minutes

## 2025-04-15 NOTE — PROGRESS NOTES
The patient was seen bedside by [facility] ARU admission clinical liaison.  The patient was provided with the following:     What to bring to Rehab form  Brochure of [facility] ARU    Discussed what to expect during the patient's rehabilitation stay.  Answered all questions and concerns.     Amber Cyr), OTR/L, Clinical Liaison  [facility]  Phone: [phone]  Fax: [phone]

## 2025-04-15 NOTE — CM/SW NOTE
12:10PM  Per chart, PMR consult completed and recommends Acute Rehab.    SW met w/ pt at bedside. AR list of quality data left for review.    Per pt's permission, SW attempted his niece Michelle via phone - no answer. Left Vmail w/ update and request to return call to SW to further discuss DC Planning.    01:45PM  Received call from pt's niece Michelle. SW discussed Acute Rehab and accepting providers.    Pt's niece confirmed she lives near [facility], but do not necessarily need/want pt to go there.    SW confirmed [facility] has yet to respond to referral.    SHABBIR emailed current AR list to pt's niece at: [email or web address].    PLAN: Acute - pending choice, ins auth & med clear      SW/CM to remain available for support and/or discharge planning.       MS AdrianaW, LSW [id number]

## 2025-04-16 LAB
ANION GAP SERPL CALC-SCNC: 8 MMOL/L (ref 0–18)
BUN BLD-MCNC: 16 MG/DL (ref 9–23)
BUN/CREAT SERPL: 21.9 (ref 10–20)
CALCIUM BLD-MCNC: 8.1 MG/DL (ref 8.7–10.4)
CHLORIDE SERPL-SCNC: 97 MMOL/L (ref 98–112)
CO2 SERPL-SCNC: 26 MMOL/L (ref 21–32)
CREAT BLD-MCNC: 0.73 MG/DL (ref 0.7–1.3)
DEPRECATED RDW RBC AUTO: 43.8 FL (ref 35.1–46.3)
EGFRCR SERPLBLD CKD-EPI 2021: 85 ML/MIN/1.73M2 (ref 60–?)
ERYTHROCYTE [DISTWIDTH] IN BLOOD BY AUTOMATED COUNT: 13.5 % (ref 11–15)
GLUCOSE BLD-MCNC: 216 MG/DL (ref 70–99)
GLUCOSE BLDC GLUCOMTR-MCNC: 193 MG/DL (ref 70–99)
GLUCOSE BLDC GLUCOMTR-MCNC: 203 MG/DL (ref 70–99)
GLUCOSE BLDC GLUCOMTR-MCNC: 241 MG/DL (ref 70–99)
GLUCOSE BLDC GLUCOMTR-MCNC: 430 MG/DL (ref 70–99)
HCT VFR BLD AUTO: 28.1 % (ref 39–53)
HGB BLD-MCNC: 9.7 G/DL (ref 13–17.5)
MCH RBC QN AUTO: 30.7 PG (ref 26–34)
MCHC RBC AUTO-ENTMCNC: 34.5 G/DL (ref 31–37)
MCV RBC AUTO: 88.9 FL (ref 80–100)
OSMOLALITY SERPL CALC.SUM OF ELEC: 280 MOSM/KG (ref 275–295)
PLATELET # BLD AUTO: 188 10(3)UL (ref 150–450)
POTASSIUM SERPL-SCNC: 3.4 MMOL/L (ref 3.5–5.1)
POTASSIUM SERPL-SCNC: 4.5 MMOL/L (ref 3.5–5.1)
RBC # BLD AUTO: 3.16 X10(6)UL (ref 3.8–5.8)
SODIUM SERPL-SCNC: 131 MMOL/L (ref 136–145)
WBC # BLD AUTO: 8 X10(3) UL (ref 4–11)

## 2025-04-16 PROCEDURE — 99233 SBSQ HOSP IP/OBS HIGH 50: CPT | Performed by: HOSPITALIST

## 2025-04-16 RX ORDER — AMLODIPINE BESYLATE 10 MG/1
10 TABLET ORAL DAILY
Status: DISCONTINUED | OUTPATIENT
Start: 2025-04-16 | End: 2025-04-17

## 2025-04-16 RX ORDER — POTASSIUM CHLORIDE 1500 MG/1
40 TABLET, EXTENDED RELEASE ORAL EVERY 4 HOURS
Status: COMPLETED | OUTPATIENT
Start: 2025-04-16 | End: 2025-04-16

## 2025-04-16 RX ORDER — METOPROLOL SUCCINATE 25 MG/1
25 TABLET, EXTENDED RELEASE ORAL
Status: DISCONTINUED | OUTPATIENT
Start: 2025-04-17 | End: 2025-04-17

## 2025-04-16 RX ORDER — EZETIMIBE 10 MG/1
10 TABLET ORAL DAILY
Qty: 90 TABLET | Refills: 3 | Status: SHIPPED | OUTPATIENT
Start: 2025-04-16

## 2025-04-16 NOTE — PROGRESS NOTES
Progress Note  Sarwat Townsend Patient Status:  Inpatient    1932 MRN [id number]   Location [facility] 3W/SW Attending Vee Tobias MD   Hosp Day # 3 PCP Kamlesh Durant MD     Subjective:  Denies cardiac complaints, niece at bedside    Objective:  /73 (BP Location: Right arm)   Pulse 101   Temp 98.5 °F (36.9 °C) (Axillary)   Resp 20   Wt 107 lb 1.6 oz (48.6 kg)   SpO2 96%   BMI 17.29 kg/m²     Telemetry: SR, frequent PACs, AF overnight with intermittent RVR, converted to sinus this morning    Intake/Output:    Intake/Output Summary (Last 24 hours) at 2025 1236  Last data filed at 2025 1200  Gross per 24 hour   Intake 830 ml   Output 1000 ml   Net -170 ml     Last 3 Weights   25 0500 107 lb 1.6 oz (48.6 kg)   04/15/25 0400 101 lb 1.6 oz (45.9 kg)   25 0533 100 lb 6.4 oz (45.5 kg)   25 1811 102 lb (46.3 kg)   25 1049 112 lb (50.8 kg)   25 1107 112 lb (50.8 kg)     Labs:  Recent Labs   Lab 25  1504 25  0651 04/15/25  0616 25  0728   * 117*  --  216*   BUN 28* 22  --  16   CREATSERUM 1.00 0.68*  --  0.73   EGFRCR 71 87  --  85   CA 9.1 8.7  --  8.1*   * 137  --  131*   K 4.1 3.6 4.0 3.4*   CL 97* 102  --  97*   CO2 26.0 21.0  --  26.0     Recent Labs   Lab 25  1503 25  0651 25  0728   RBC 3.32* 3.08* 3.16*   HGB 10.6* 9.6* 9.7*   HCT 30.2* 28.7* 28.1*   MCV 91.0 93.2 88.9   MCH 31.9 31.2 30.7   MCHC 35.1 33.4 34.5   RDW 13.6 13.6 13.5   NEPRELIM 8.23*  --   --    WBC 10.4 7.7 8.0   .0 181.0 188.0     Recent Labs   Lab 25  0732 25  1201 25  1732   TROPHS 4,377* 3,767* 3,188*     Lab Results   Component Value Date/Time    HDL 74 (H) 2025 03:04 PM    LDL 55 2025 03:04 PM    TRIG 68 2025 03:04 PM     No results found for: \"DDIMER\"  Lab Results   Component Value Date    TSH 1.264 2025     Review of Systems:   Constitutional: No fevers, chills, fatigue or night  sweats.  Respiratory: Denies cough, wheezing or shortness of breath.  CV: Denies chest pain, palpitations, orthopnea, PND or dizziness.  GI: No nausea, vomiting or diarrhea. No blood in stools.    Physical Exam:   General: Alert, cooperative, no distress, appears stated age.  Neck: no JVD.  Lungs:clear to auscultation bilaterally.  Chest wall: No tenderness or deformity.  Heart: Regular rate and rhythm, S1, S2 normal, no murmur, click, rub or gallop.  Abdomen: Soft, non-tender. Bowel sounds normal. No masses,  No organomegaly.  Extremities: Extremities normal, atraumatic, no cyanosis, no edema.  Pulses: 2+ and symmetric all extremities.  Neurologic: Grossly intact.    Medications:  Scheduled Medications[1]  Medication Infusions[2]    Assessment:    Acute CVA   Presented with aphasia, right sided weakness & head CT w/ subacute L occipital cortical infarct along with prior cerebellar infarcts and chronic small vessel changes with large vessel atherosclerosis  -asa, statin  -neuro following, brain MRI revealed multiple acute cerebral infarcts with largest in left greater than right occipital lobe     Elevated troponin (suspect type 2 demand)   Peak TnI 4387, now w/ slight downtrend   -no acute angina, EKG w/ no acute ischemic changes. No heparin at this time   -suspect secondary to acute neurological event    -echocardiogram revealed normal LV function 50% with no RWMA   -continue medical management w/ consideration for eventual ischemic evaluation   -on asa, statin     Paroxysmal Atrial fibrillation  New onset this admission, Episode overnight for ~9 hours, converted to sinus around 7AM  - [id number] = 6 (age, htn, cva, dm)  -LV function preserved on echo this admission  -not on AVN agents or AAD, will start low dose toprol      HTN   BP 130s-140s, home meds on hold  -resume home amlodipine     HLD  -risk factor modification, on atorvastatin & zetia at home     T2DM   -On insulin per PMD     Acute hypoxia  Acute  event yesterday requiring supplemental O2  -IV lasix given x 1 dose with good response  -CXR revealed pulmonary edema vs pneumonia, on exam lung were rhoncherous  -now improvement with diuresis, off O2  -I/Os net neg 2.1L, 2.5L diuresed yesterday, cr stable  -repeat limited echo for EF evaluation with acute onset HF     Hypokalemia  K+ 3.4  -replete per cardiac electrolyte replacement protocol      Plan:  -start toprol 25mg daily  -Resume home amlodipine 10mg daily, monitor BP  -repeat limited echo  -continue asa, statin  -further recs per primary      Plan of care discussed with patient, RN.        Ruby Bateman, MSN, APN, FNP-BC, CCK  04/16/25  1:49 PM  [phone] [place]  [phone] [place]      ==========================  Agree with findings, assessment and plan as outlined above.   Improved breathing.   Limited TTE is pending.   Will need consideration for AC once safe from neuro standpoint.   I have personally performed the medical decision making in its entirety.   Gerald Venegas DO         [1]    potassium chloride  40 mEq Oral Q4H    piperacillin-tazobactam  3.375 g Intravenous Q8H    insulin aspart  1-7 Units Subcutaneous TID CC    heparin  5,000 Units Subcutaneous Q8H YOANDY    aspirin  81 mg Oral Daily    atorvastatin  40 mg Oral Nightly   [2]

## 2025-04-16 NOTE — CM/SW NOTE
09:20AM  SW received Vmail from pt's camila Martinez this AM. Per Vmail, locations of Acute Rehabs are too far. She is inquiring about [facility].    SHABBIR contacted [facility] via phone #: [phone] and spoke to Catherine. Per Catherine, she has to speak w/ her supervisor who was covering yesterday. She is unsure if they received the packet from SHABBIR or not.    Catherine confirmed she will contact SHABBIR w/ update once packet received and reviewed.    Pt's camial Martinez updated via phone on above.    SW to attempt [facility] again if no response in a few hours and as schedule permits.    10:20AM  Received Vmail from Catherine w/ [facility] - confirmed they received the clinical packet yesterday and it has been sent to Medical Director for review.    Pending final determination from [facility] on acceptance.    12:10PM  Received update from Fabiola at [facility] - pt is clinically accepted.    Notified pt's camila Martinez via phone - she was not at bedside at this time. She confirmed [facility] is choice. All questions addressed/answered.    SHABBIR sent response via Aidin to [facility] and spoke to Catherine via phone - confirmed they are choice and requested they submit for insurance auth.    SHABBIR spoke to Malika morin/  - [facility] set on WILL CALL through 4/19. OOP quote is $80. PCS completed and will need date added day of actual DC.    01:10PM  Notified by Fabiola morin/ [facility] - auth has been initiated.  ref# [id number]    PLAN: [facility] Acute, [facility] on WC, PCS needs date - pending ins auth & med clear        SW/CM to remain available for support and/or discharge planning.         Adriana, MSW, LSW [id number]

## 2025-04-16 NOTE — PLAN OF CARE
Hyperglycemic  notified meds given see MAR. Call light within reach and fall precautions in place.     Problem: Patient Centered Care  Goal: Patient preferences are identified and integrated in the patient's plan of care  Description: Interventions:- What would you like us to know as we care for you? HOME ALONE- Provide timely, complete, and accurate information to patient/family- Incorporate patient and family knowledge, values, beliefs, and cultural backgrounds into the planning and delivery of care- Encourage patient/family to participate in care and decision-making at the level they choose- Honor patient and family perspectives and choices  Outcome: Progressing     Problem: Diabetes/Glucose Control  Goal: Glucose maintained within prescribed range  Description: INTERVENTIONS:- Monitor Blood Glucose as ordered- Assess for signs and symptoms of hyperglycemia and hypoglycemia- Administer ordered medications to maintain glucose within target range- Assess barriers to adequate nutritional intake and initiate nutrition consult as needed- Instruct patient on self management of diabetes  Outcome: Progressing     Problem: Patient/Family Goals  Goal: Patient/Family Long Term Goal  Description: Patient's Long Term Goal: to go back home Interventions:- follow medical regimen- See additional Care Plan goals for specific interventions  Outcome: Progressing  Goal: Patient/Family Short Term Goal  Description: Patient's Short Term Goal: to not be weak Interventions: - neuro consult - See additional Care Plan goals for specific interventions  Outcome: Progressing     Problem: NEUROLOGICAL - ADULT  Goal: Achieves stable or improved neurological status  Description: INTERVENTIONS- Assess for and report changes in neurological status- Initiate measures to prevent increased intracranial pressure- Maintain blood pressure and fluid volume within ordered parameters to optimize cerebral perfusion and minimize risk of hemorrhage-  Monitor temperature, glucose, and sodium. Initiate appropriate interventions as ordered  Outcome: Progressing  Goal: Achieves maximal functionality and self care  Description: INTERVENTIONS- Monitor swallowing and airway patency with patient fatigue and changes in neurological status- Encourage and assist patient to increase activity and self care with guidance from PT/OT- Encourage visually impaired, hearing impaired and aphasic patients to use assistive/communication devices  Outcome: Progressing

## 2025-04-16 NOTE — PLAN OF CARE
Pt A/Ox3, Max assist. Neuros Q4. IV zoysn continued.     Problem: Patient Centered Care  Goal: Patient preferences are identified and integrated in the patient's plan of care  Description: Interventions:- What would you like us to know as we care for you? I would like to return home. - Provide timely, complete, and accurate information to patient/family- Incorporate patient and family knowledge, values, beliefs, and cultural backgrounds into the planning and delivery of care- Encourage patient/family to participate in care and decision-making at the level they choose- Honor patient and family perspectives and choices  Outcome: Progressing     Problem: Diabetes/Glucose Control  Goal: Glucose maintained within prescribed range  Description: INTERVENTIONS:- Monitor Blood Glucose as ordered- Assess for signs and symptoms of hyperglycemia and hypoglycemia- Administer ordered medications to maintain glucose within target range- Assess barriers to adequate nutritional intake and initiate nutrition consult as needed- Instruct patient on self management of diabetes  Outcome: Progressing     Problem: Patient/Family Goals  Goal: Patient/Family Long Term Goal  Description: Patient's Long Term Goal: Get stronger. Interventions:- Ambulate often. - See additional Care Plan goals for specific interventions  Outcome: Progressing  Goal: Patient/Family Short Term Goal  Description: Patient's Short Term Goal: Ask frequent questions. Interventions: - Work with staff on ways to improve overall health status. - See additional Care Plan goals for specific interventions  Outcome: Progressing     Problem: NEUROLOGICAL - ADULT  Goal: Achieves stable or improved neurological status  Description: INTERVENTIONS- Assess for and report changes in neurological status- Initiate measures to prevent increased intracranial pressure- Maintain blood pressure and fluid volume within ordered parameters to optimize cerebral perfusion and minimize risk of  hemorrhage- Monitor temperature, glucose, and sodium. Initiate appropriate interventions as ordered  Outcome: Progressing  Goal: Achieves maximal functionality and self care  Description: INTERVENTIONS- Monitor swallowing and airway patency with patient fatigue and changes in neurological status- Encourage and assist patient to increase activity and self care with guidance from PT/OT- Encourage visually impaired, hearing impaired and aphasic patients to use assistive/communication devices  Outcome: Progressing

## 2025-04-16 NOTE — SLP NOTE
SPEECH DAILY NOTE - INPATIENT    ASSESSMENT & PLAN   ASSESSMENT  PPE REQUIRED. THIS THERAPIST WORE GLOVES FOR DURATION OF SESSION. HANDS WASHED UPON ENTRANCE/EXIT.    SLP f/u for ongoing meal assessment per recommendations of regular/thin liquids per BSE. RN reports pt tolerates diet and medication well with no overt clinical s/s aspiration. Pt denies any swallowing challenges.     Pt positioned upright bedside chair, alert/cooperative/family member present. Pt afebrile, tolerating room air with oxygen status 96% prior to the start of oral trials. SLP reviewed aspiration precautions and safe swallowing compensatory strategies with the patient. Safe swallow guidelines remain written on the white board in purple. Patient and family v/u. Provided 1:1 assistance, pt tolerates hard solids and thin liquids via straw with no overt clinical signs/symptoms of aspiration. Oxygen status remained stable t/o the entire session. Recommend remain on current diet with adherence to aspiration precautions and swallow strategies. No further swallow services warranted at this time. Please re consult if needed. RN alerted with results and recommendations.     MOST RECENT CXR 4/15  CONCLUSION:   Diffusely increased interstitial and alveolar markings bilaterally, which may be secondary to interstitial and alveolar edema with or without superimposed pneumonia in the appropriate clinical setting.   Suspected trace bilateral pleural effusions.        Diet Recommendations - Solids: Regular  Diet Recommendations - Liquids: Thin Liquids     Aspiration Precautions: Upright position, Slow rate, Small bites, Small sips  Medication Administration Recommendations:  (as tolerated)    Patient Experiencing Pain: No              Treatment Plan  Treatment Plan/Recommendations: Aspiration precautions, Cognitive communication therapy, Dysarthria therapy    Interdisciplinary Communication: Plan posted at bedside          GOALS  Goal #1 The patient will  tolerate regular consistency and thin liquids without overt signs or symptoms of aspiration with 100 % accuracy over 1-2 session(s).  Met 4/16   Goal #2 The patient/family/caregiver will demonstrate understanding and implementation of aspiration precautions and swallow strategies independently over 1-2 session(s).    Met 4/16   Goal #3 The patient will utilize compensatory strategies as outlined by  BSSE (clinical evaluation) including Slow rate, Small bites, Small sips, No straws, Upright 90 degrees, Eliminate distractions with PRN assistance 100 % of the time across 1-2 sessions.  Met 4/16     FOLLOW UP  Follow Up Needed (Documentation Required): Yes  SLP Follow-up Date: 04/17/25  Duration: 1 week    Session: 2    If you have any questions, please contact BUCK Robles M.S. CCC-SLP  Speech Language Pathologist  Phone Number Ext. [phone]

## 2025-04-16 NOTE — PHYSICAL THERAPY NOTE
PHYSICAL THERAPY TREATMENT NOTE - INPATIENT     Room Number: 320/320-A       Presenting Problem: acute CVA, new onset a-fib;  right sided weakness, blurred vision and expressive aphasia, word finding difficulties  Co-Morbidities : HTN, HLD, DM2, prior CVA    Problem List  Principal Problem:    Cerebrovascular accident (CVA) ([facility])  Active Problems:    NSTEMI (non-ST elevated myocardial infarction) ([facility])      PHYSICAL THERAPY ASSESSMENT   Patient demonstrates good  progress this session, goals  remain in progress.      Patient is requiring contact guard assist and moderate assist as a result of the following impairments: decreased functional strength, decreased endurance/aerobic capacity, impaired standing balance, impaired motor planning, decreased muscular endurance, and medical status.     Patient continues to function below baseline with bed mobility, transfers, gait, stair negotiation, maintaining seated position, standing prolonged periods, and performing household tasks.  Next session anticipate patient to progress gait and standing prolonged periods.  Physical Therapy will continue to follow patient for duration of hospitalization.    Patient continues to benefit from continued skilled PT services: to facilitate return to prior level of function as patient demonstrates high motivation with excellent tolerance to an intensive therapy program . Patient is tolerating 45 min session well with minimal rest breaks    PLAN DURING HOSPITALIZATION  Nursing Mobility Recommendation : 1 Assist  PT Device Recommendation: Rolling walker, Gait belt  PT Treatment Plan: Bed mobility, Coordination, Patient education, Gait training, Neuromuscular re-educate, Transfer training, Balance training, Stoop training  Frequency (Obs): Daily     SUBJECTIVE  \"I haven't brushed my teeth yet\"    OBJECTIVE  Precautions: HOB elevated 30 degrees, Cardiac, Limb alert - right, Limb alert - left, Bed/chair alarm, Hard of hearing      PAIN  ASSESSMENT   Ratin  Location: no complaints  Management Techniques: Activity promotion, Repositioning    BALANCE  Static Sitting: Fair -  Dynamic Sitting: Poor +  Static Standing: Poor +  Dynamic Standing: Poor -    ACTIVITY TOLERANCE  Pulse: 88  Heart Rate Source: Monitor     BP: 126/63  BP Location: Right arm  BP Method: Automatic  Patient Position: Sitting     O2 WALK  Oxygen Therapy  SPO2% on Room Air at Rest: 95  SPO2% Ambulation on Room Air: 95    AM-PAC '6-Clicks' INPATIENT SHORT FORM - BASIC MOBILITY  How much difficulty does the patient currently have...  Patient Difficulty: Turning over in bed (including adjusting bedclothes, sheets and blankets)?: A Little   Patient Difficulty: Sitting down on and standing up from a chair with arms (e.g., wheelchair, bedside commode, etc.): A Little   Patient Difficulty: Moving from lying on back to sitting on the side of the bed?: A Lot   How much help from another person does the patient currently need...   Help from Another: Moving to and from a bed to a chair (including a wheelchair)?: A Lot   Help from Another: Need to walk in hospital room?: A Lot   Help from Another: Climbing 3-5 steps with a railing?: A Lot     AM-PAC Score:  Raw Score: 14   Approx Degree of Impairment: 61.29%   Standardized Score (AM-PAC Scale): 38.1   CMS Modifier (G-Code): CL    FUNCTIONAL ABILITY STATUS  Functional Mobility/Gait Assessment  Gait Assistance: Moderate assistance, Minimum assistance  Distance (ft): 25'x3  Assistive Device: Rolling walker  Pattern: Shuffle, Comment (significantly forward flexed)    Sit to Stand:  initial moderate assistance and progressing to min A with repetition    Skilled Therapy Provided: RN approves participation. Patient presents sitting up in chair with niece and nephew at bedside. He is agreeable to therapy. He reports feeling more tired than normal. He is progressing well towards his goals and is motivated to get back to independent level. He reports  vision is still impaired but getting a bit better. He is noted to have impaired coordination with tooth brushing and when attempting self cares but improved from a few days ago. He is able to put his glasses on with more stability and not poking his eye. He is able to stand for 5 min at a time and work on balance, posture. He  has drooling and needs cues to close mouth and try to swallow. He is awake and alert, motivated to get better. Walking with better LE clearance but more forward flexed and needs cues for upright posture. He is returned to chair with all needs in reach and RN aware.    The patient's Approx Degree of Impairment: 61.29% has been calculated based on documentation in the [facility] '6 clicks' Inpatient Daily Activity Short Form.  Research supports that patients with this level of impairment may benefit from Inpatient rehab and ACUTE rehab is the recommendation. Final disposition will be made by interdisciplinary medical team.    THERAPEUTIC EXERCISES  Lower Extremity Alternating marching  Ankle pumps  LAQ  Quad sets  Toe raises  Repeated sit to/from stand      Position Sitting & Standing       Patient End of Session: Up in chair, Needs met, Call light within reach, RN aware of session/findings, All patient questions and concerns addressed, Hospital anti-slip socks, Family present    CURRENT GOALS     Goals to be met by: 4/28/25  Patient Goal Patient's self-stated goal is: to get back to independent level   Goal #1 Patient is able to demonstrate supine - sit EOB @ level: minimum assistance      Goal #1   Current Status  In progress   Goal #2 Patient is able to demonstrate transfers Sit to/from Stand at assistance level: minimum assistance with walker - rolling      Goal #2  Current Status  In progress   Goal #3 Patient is able to ambulate 25 feet with assist device: walker - rolling at assistance level: minimum assistance   Goal #3   Current Status  In progress   Goal #4 Patient will negotiate one curb  w/ assistive device and maximal assistance   Goal #4   Current Status  In progress   Goal #5 Patient to demonstrate independence with home activity/exercise instructions provided to patient in preparation for discharge.   Goal #5   Current Status  In progress   Goal #6 Patient to navigate bed to/from chair transfers with RW with minimal assistance   Goal #6  Current Status  In progress       Gait Trainin minutes  Therapeutic Activity: 21 minutes  Therapeutic Exercise: 10 minutes

## 2025-04-16 NOTE — PROGRESS NOTES
[facility]  Progress Note     Sarwat Townsend  : 1932    Status: Inpatient  Day #: 3    Attending: Radhames Olmedo MD  PCP: Kamlesh Durant MD     Assessment and Plan:    Sarwat Townsend is a 89 year old male with a history of HTN, HLD and DM2, who presents with right-sided weakness, blurry vision, slurred speech and shortness of breath for 2 days.      # Subacute embolic stroke - related to afib  - CT head showed subacute left occipital cortical infarct, small chronic right occipital cortical infarct, and old left cerebellar infarct. MRI pending   - apprec neuro consult. Hold heparin for 2 weeks for now   - Aspirin 324 mg given, continue aspirin 81 mg daily and atorvastatin 40 mg   - Patient failed bedside speech eval  - PT, OT, SLP  - TSH, A1c, LDL - 8.7 A1c, ldl 55, tsh normal   - PT/OT saw patient - reccomending acute rehab      Paroxysmal afib  -in NSR  -cont cardiac meds as below  -asa monotherapy for 2 weeks then change to eliquis monotherapy     SOB - component of acute HFpEF, possible pneumonia  - CXR shows possible fluid overload versus pna  - cont zosyn IV -> augmentin to complete 5 day course  - received lasix IV     # Hypertension  - soft BP, hold home amlodipine     # DM2 - on metformin 850 bid at home   - SSI Q6H  change to ac and hs   - A1c 8.7   - diet education      # Hyponatremia  - Na 133  - NS infusion  - BMP in AM     Malnutrion - dietician consulted         DVT Mechanical Prophylaxis:   SCDs,    DVT Pharmacologic Prophylaxis   Medication    heparin (Porcine) 5000 UNIT/ML injection 5,000 Units      DVT Pharmacologic prophylaxis: Aspirin 162 mg        Subjective:      Initial Chief Complaint:  aphasia, R weakness    No CP, no SOB currently. No new focal neuro deficits    10 point ROS completed and was negative, except for pertinent positive and negatives stated in subjective.      Objective:      Temp:  [97.7 °F (36.5 °C)-98.5 °F (36.9 °C)] 98.5 °F (36.9 °C)  Pulse:  []  80  Resp:  [18-20] 18  BP: (109-144)/(57-83) 118/57  SpO2:  [93 %-98 %] 96 %  General:  Alert, no distress  HEENT:  Normocephalic, atraumatic  Cardiac:  Regular rate, regular rhythm  Pulmonary:  Clear to auscultation bilaterally, respirations unlabored  Gastrointestinal:  Soft, non-tender, normal bowel sounds  Musculoskeletal:  No joint swelling  Extremities:  No edema, no cyanosis, no clubbing  Neurologic:  R upper and lower ext slighly weaker than left. aphasic  Psychiatric:  Normal affect, calm and appropriate    Intake/Output Summary (Last 24 hours) at 4/16/2025 1633  Last data filed at 4/16/2025 1500  Gross per 24 hour   Intake 480 ml   Output 600 ml   Net -120 ml         Recent Labs   Lab 04/13/25  1503 04/14/25  0651 04/16/25  0728   WBC 10.4 7.7 8.0   HGB 10.6* 9.6* 9.7*   HCT 30.2* 28.7* 28.1*   .0 181.0 188.0   RBC 3.32* 3.08* 3.16*   MCV 91.0 93.2 88.9   MCH 31.9 31.2 30.7   MCHC 35.1 33.4 34.5   RDW 13.6 13.6 13.5   NEPRELIM 8.23*  --   --      Recent Labs   Lab 04/13/25  1504 04/14/25  0651 04/14/25  0732 04/15/25  0616 04/16/25  0728   BUN 28* 22  --   --  16   CREATSERUM 1.00 0.68*  --   --  0.73   CA 9.1 8.7  --   --  8.1*   ALB 4.0  --   --   --   --    * 137  --   --  131*   K 4.1 3.6  --  4.0 3.4*   CL 97* 102  --   --  97*   CO2 26.0 21.0  --   --  26.0   * 117*  --   --  216*   MG  --   --  1.9  --   --    BILT 0.7  --   --   --   --    AST 57*  --   --   --   --    ALT 52*  --   --   --   --    ALKPHO 223*  --   --   --   --    TP 7.1  --   --   --   --    TSH  --  1.264  --   --   --        XR CHEST AP PORTABLE  (CPT=71045)  Result Date: 4/15/2025  CONCLUSION:   Diffusely increased interstitial and alveolar markings bilaterally, which may be secondary to interstitial and alveolar edema with or without superimposed pneumonia in the appropriate clinical setting.  Suspected trace bilateral pleural effusions.    Dictated by (CST): Bhumi Maciel MD on 4/15/2025 at 1:34 PM      Finalized by (CST): Bhumi Maciel MD on 4/15/2025 at 1:35 PM          CT BRAIN OR HEAD (CPT=70450)  Result Date: 4/15/2025  CONCLUSION:  1. Evolving bilateral occipital and cerebellar infarcts. Additional lacunar infarcts delineated by MRI are not well depicted by CT assessment. No new acute intracranial process by noncontrast CT technique.  2. Senescent changes of parenchymal volume loss with sequela of chronic microvascular ischemic disease.  3. There is extensive large vessel atherosclerosis involving the anterior and posterior intracranial circulations.  4. Lesser incidental findings as above.       Dictated by (CST): Beltran Bowers MD on 4/15/2025 at 10:09 AM     Finalized by (CST): Beltran Bowers MD on 4/15/2025 at 10:14 AM          EKG 12 Lead  Result Date: 4/16/2025  Atrial fibrillation Right bundle branch block ST elevation consider inferior injury or acute infarct ** ** ACUTE MI / STEMI ** ** Consider right ventricular involvement in acute inferior infarct Abnormal ECG When compared with ECG of 14-APR-2025 06:18, QRS duration has increased    Medications:  Scheduled Medications[1]   PRN Meds: PRN Medications[2]    Supplementary Documentation:   DVT Mechanical Prophylaxis:   SCDs,    DVT Pharmacologic Prophylaxis   Medication    heparin (Porcine) 5000 UNIT/ML injection 5,000 Units      DVT Pharmacologic prophylaxis: Aspirin 162 mg         Code Status: Not on file  Raines: External urinary catheter in place  Raines Duration (in days):   Central line:    EDIWN: 4/17/2025                    [facility]. Time spent on chart/note review, review of labs/imaging, discussion with patient, physical exam, discussion with staff, consultants, coordinating care, writing progress note, discussion of plan of care.      Radhames Olmedo MD         [1]    amLODIPine  10 mg Oral Daily    [START ON 4/17/2025] metoprolol succinate ER  25 mg Oral Daily Beta Blocker    piperacillin-tazobactam  3.375 g Intravenous Q8H    insulin aspart   1-7 Units Subcutaneous TID CC    heparin  5,000 Units Subcutaneous Q8H [facility]    aspirin  81 mg Oral Daily    atorvastatin  40 mg Oral Nightly   [2]   acetaminophen **OR** acetaminophen    ondansetron    metoclopramide    acetaminophen    melatonin    polyethylene glycol (PEG 3350)    sennosides    bisacodyl    fleet enema    guaiFENesin    glucose **OR** glucose **OR** glucose-vitamin C **OR** dextrose **OR** glucose **OR** glucose **OR** glucose-vitamin C

## 2025-04-16 NOTE — TELEPHONE ENCOUNTER
Refill Per Protocol     Requested Prescriptions   Pending Prescriptions Disp Refills    EZETIMIBE 10 MG Oral Tab [Pharmacy Med Name: EZETIMIBE TABS 10MG] 90 tablet 3     Sig: TAKE 1 TABLET DAILY       Cholesterol Medication Protocol Passed - 4/16/2025  2:04 PM        Passed - ALT < 80     Lab Results   Component Value Date    ALT 52 (H) 04/13/2025             Passed - ALT resulted within past year        Passed - Lipid panel within past 12 months     Lab Results   Component Value Date    CHOLEST 143 04/13/2025    TRIG 68 04/13/2025    HDL 74 (H) 04/13/2025    LDL 55 04/13/2025    VLDL 10 04/13/2025    NONHDLC 69 04/13/2025             Passed - In person appointment or virtual visit in the past 12 mos or appointment in next 3 mos     Recent Outpatient Visits              3 weeks ago Bilateral occipital neuralgia    [facility] Gerald Anderson DO    Office Visit    1 month ago Medicare [facility]    7 months ago Essential hypertension    [facility], Kamlesh Luis MD    Office Visit    1 year ago Impacted cerumen of right ear    [facility], Kamlesh Luis MD    Office Visit    1 year ago Medicare annual wellness visit, subsequent    [facility] Kamlesh Luis MD    Office Visit          Future Appointments         Provider Department Appt Notes    In 4 weeks Kaylin Cordova DO [facility] hfu+ stroke f/ up   unable to enter as non tcm due to location    In 4 months [facility]                    Passed - Medication is active on med list

## 2025-04-17 ENCOUNTER — APPOINTMENT (OUTPATIENT)
Dept: CV DIAGNOSTICS | Facility: HOSPITAL | Age: OVER 89
End: 2025-04-17
Attending: NURSE PRACTITIONER
Payer: MEDICARE

## 2025-04-17 VITALS
OXYGEN SATURATION: 94 % | BODY MASS INDEX: 18 KG/M2 | TEMPERATURE: 98 F | HEART RATE: 80 BPM | WEIGHT: 112 LBS | DIASTOLIC BLOOD PRESSURE: 66 MMHG | SYSTOLIC BLOOD PRESSURE: 138 MMHG | RESPIRATION RATE: 22 BRPM

## 2025-04-17 LAB
ANION GAP SERPL CALC-SCNC: 8 MMOL/L (ref 0–18)
BASOPHILS # BLD AUTO: 0.04 X10(3) UL (ref 0–0.2)
BASOPHILS NFR BLD AUTO: 0.4 %
BUN BLD-MCNC: 13 MG/DL (ref 9–23)
BUN/CREAT SERPL: 17.1 (ref 10–20)
CALCIUM BLD-MCNC: 8.4 MG/DL (ref 8.7–10.4)
CHLORIDE SERPL-SCNC: 97 MMOL/L (ref 98–112)
CO2 SERPL-SCNC: 24 MMOL/L (ref 21–32)
CREAT BLD-MCNC: 0.76 MG/DL (ref 0.7–1.3)
DEPRECATED RDW RBC AUTO: 44.6 FL (ref 35.1–46.3)
EGFRCR SERPLBLD CKD-EPI 2021: 84 ML/MIN/1.73M2 (ref 60–?)
EOSINOPHIL # BLD AUTO: 0.2 X10(3) UL (ref 0–0.7)
EOSINOPHIL NFR BLD AUTO: 2.2 %
ERYTHROCYTE [DISTWIDTH] IN BLOOD BY AUTOMATED COUNT: 13.6 % (ref 11–15)
GLUCOSE BLD-MCNC: 253 MG/DL (ref 70–99)
GLUCOSE BLDC GLUCOMTR-MCNC: 209 MG/DL (ref 70–99)
GLUCOSE BLDC GLUCOMTR-MCNC: 304 MG/DL (ref 70–99)
HCT VFR BLD AUTO: 29.8 % (ref 39–53)
HGB BLD-MCNC: 10.2 G/DL (ref 13–17.5)
IMM GRANULOCYTES # BLD AUTO: 0.03 X10(3) UL (ref 0–1)
IMM GRANULOCYTES NFR BLD: 0.3 %
LYMPHOCYTES # BLD AUTO: 1.07 X10(3) UL (ref 1–4)
LYMPHOCYTES NFR BLD AUTO: 11.7 %
MCH RBC QN AUTO: 31.1 PG (ref 26–34)
MCHC RBC AUTO-ENTMCNC: 34.2 G/DL (ref 31–37)
MCV RBC AUTO: 90.9 FL (ref 80–100)
MONOCYTES # BLD AUTO: 0.62 X10(3) UL (ref 0.1–1)
MONOCYTES NFR BLD AUTO: 6.8 %
NEUTROPHILS # BLD AUTO: 7.17 X10 (3) UL (ref 1.5–7.7)
NEUTROPHILS # BLD AUTO: 7.17 X10(3) UL (ref 1.5–7.7)
NEUTROPHILS NFR BLD AUTO: 78.6 %
OSMOLALITY SERPL CALC.SUM OF ELEC: 277 MOSM/KG (ref 275–295)
PLATELET # BLD AUTO: 240 10(3)UL (ref 150–450)
POTASSIUM SERPL-SCNC: 4.6 MMOL/L (ref 3.5–5.1)
Q-T INTERVAL: 398 MS
QRS DURATION: 154 MS
QTC CALCULATION (BEZET): 508 MS
R AXIS: 69 DEGREES
RBC # BLD AUTO: 3.28 X10(6)UL (ref 3.8–5.8)
SODIUM SERPL-SCNC: 129 MMOL/L (ref 136–145)
T AXIS: 136 DEGREES
VENTRICULAR RATE: 98 BPM
WBC # BLD AUTO: 9.1 X10(3) UL (ref 4–11)

## 2025-04-17 PROCEDURE — 93325 DOPPLER ECHO COLOR FLOW MAPG: CPT | Performed by: NURSE PRACTITIONER

## 2025-04-17 PROCEDURE — 93308 TTE F-UP OR LMTD: CPT | Performed by: NURSE PRACTITIONER

## 2025-04-17 PROCEDURE — 93321 DOPPLER ECHO F-UP/LMTD STD: CPT | Performed by: NURSE PRACTITIONER

## 2025-04-17 PROCEDURE — 99239 HOSP IP/OBS DSCHRG MGMT >30: CPT | Performed by: HOSPITALIST

## 2025-04-17 RX ORDER — ASPIRIN 81 MG/1
81 TABLET, CHEWABLE ORAL DAILY
Qty: 14 TABLET | Refills: 0 | Status: SHIPPED | OUTPATIENT
Start: 2025-04-18 | End: 2025-05-02

## 2025-04-17 RX ORDER — POLYETHYLENE GLYCOL 3350 17 G/17G
17 POWDER, FOR SOLUTION ORAL DAILY PRN
Status: SHIPPED | COMMUNITY
Start: 2025-04-17

## 2025-04-17 RX ORDER — HEPARIN SODIUM 5000 [USP'U]/ML
5000 INJECTION, SOLUTION INTRAVENOUS; SUBCUTANEOUS EVERY 8 HOURS SCHEDULED
Status: SHIPPED | COMMUNITY
Start: 2025-04-17

## 2025-04-17 RX ORDER — ATORVASTATIN CALCIUM 40 MG/1
40 TABLET, FILM COATED ORAL NIGHTLY
Qty: 30 TABLET | Refills: 1 | Status: SHIPPED | OUTPATIENT
Start: 2025-04-17

## 2025-04-17 RX ORDER — METOPROLOL SUCCINATE 25 MG/1
25 TABLET, EXTENDED RELEASE ORAL
Qty: 30 TABLET | Refills: 1 | Status: SHIPPED | OUTPATIENT
Start: 2025-04-18

## 2025-04-17 NOTE — PROGRESS NOTES
Progress Note  Sarwat Townsend Patient Status:  Inpatient    1932 MRN [id number]   Location [facility] 3W/SW Attending Vee Tobias MD   Hosp Day # 4 PCP Kamlesh Durant MD     Subjective:  Denies cardiac complaints, no chest pain reported    Telemetry sinus rhythm no A-fib.    Objective:  /66 (BP Location: Right arm)   Pulse 80   Temp 98 °F (36.7 °C) (Axillary)   Resp 22   Wt 112 lb (50.8 kg)   SpO2 94%   BMI 18.08 kg/m²          Intake/Output:    Intake/Output Summary (Last 24 hours) at 2025 1035  Last data filed at 2025 0835  Gross per 24 hour   Intake 1190 ml   Output 1100 ml   Net 90 ml     Last 3 Weights   25 0454 112 lb (50.8 kg)   25 0500 107 lb 1.6 oz (48.6 kg)   04/15/25 0400 101 lb 1.6 oz (45.9 kg)   25 0533 100 lb 6.4 oz (45.5 kg)   25 1811 102 lb (46.3 kg)   25 1049 112 lb (50.8 kg)   25 1107 112 lb (50.8 kg)     Labs:  Recent Labs   Lab 25  0651 04/15/25  0616 25  0728 25  1910 25  0831   *  --  216*  --  253*   BUN 22  --  16  --  13   CREATSERUM 0.68*  --  0.73  --  0.76   EGFRCR 87  --  85  --  84   CA 8.7  --  8.1*  --  8.4*     --  131*  --  129*   K 3.6   < > 3.4* 4.5 4.6     --  97*  --  97*   CO2 21.0  --  26.0  --  24.0    < > = values in this interval not displayed.     Recent Labs   Lab 25  1503 25  0651 25  0728 25  0831   RBC 3.32* 3.08* 3.16* 3.28*   HGB 10.6* 9.6* 9.7* 10.2*   HCT 30.2* 28.7* 28.1* 29.8*   MCV 91.0 93.2 88.9 90.9   MCH 31.9 31.2 30.7 31.1   MCHC 35.1 33.4 34.5 34.2   RDW 13.6 13.6 13.5 13.6   NEPRELIM 8.23*  --   --  7.17   WBC 10.4 7.7 8.0 9.1   .0 181.0 188.0 240.0     Recent Labs   Lab 25  0732 25  1201 25  1732   TROPHS 4,377* 3,767* 3,188*     Lab Results   Component Value Date/Time    HDL 74 (H) 2025 03:04 PM    LDL 55 2025 03:04 PM    TRIG 68 2025 03:04 PM     No results found for:  \"DDIMER\"  Lab Results   Component Value Date    TSH 1.264 04/14/2025          Physical Exam:   General: Alert, cooperative, no distress, appears stated age.  Neck: no JVD.  Lungs:clear to auscultation bilaterally.  Chest wall: No tenderness or deformity.  Heart: Regular rate and rhythm, S1, S2 normal, no murmur, click, rub or gallop.  Abdomen: Soft, non-tender. Bowel sounds normal. No masses,  No organomegaly.  Extremities: Extremities normal, atraumatic, no cyanosis, no edema.  Pulses: 2+ and symmetric all extremities.  Neurologic: Grossly intact.    Medications:  Scheduled Medications[1]  Medication Infusions[2]    Assessment:    Acute CVA   Presented with aphasia, right sided weakness & head CT w/ subacute L occipital cortical infarct along with prior cerebellar infarcts and chronic small vessel changes with large vessel atherosclerosis  -asa, statin  -neuro following, brain MRI revealed multiple acute cerebral infarcts with largest in left greater than right occipital lobe     Elevated troponin (suspect type 2 demand)   Peak TnI 4387, downtrending now 3200 no need for further checks.  -no acute angina, EKG w/ no acute ischemic changes. No heparin at this time   Echocardiogram reviewed Limited shows possible small inferior hypokinesis.  -echocardiogram revealed normal LV function 50% with no RWMA   -continue medical management asymptomatic.  -on asa, statin     Paroxysmal Atrial fibrillation  New onset this admission, no further episodes overnight.  - [id number] = 6 (age, htn, cva, dm)  -LV function preserved on echo this admission  -not on AVN agents or AAD, continue toprol   Recommend starting anticoagulation with Eliquis 2.5 mg twice a day when okay with neurology    HTN   BP 130s-140s, mostly well-controlled now  Continue amlodipine 10 mg and Toprol-XL 25 mg daily     HLD  -risk factor modification, on atorvastatin & zetia at home     T2DM   -On insulin per PMD     Acute hypoxia  Now resolved status post IV  Lasix.    Hypokalemia  As per primary      Plan:  -start Eliquis 2.5 mg twice daily when okay with neurology  -continue asa, statin  - No plans for angiogram recommend outpatient ischemic evaluation after discharge  - No need for further troponin checks    MEKA Hill MD            [1]    amLODIPine  10 mg Oral Daily    metoprolol succinate ER  25 mg Oral Daily Beta Blocker    piperacillin-tazobactam  3.375 g Intravenous Q8H    insulin aspart  1-7 Units Subcutaneous TID CC    heparin  5,000 Units Subcutaneous Q8H [facility]    aspirin  81 mg Oral Daily    atorvastatin  40 mg Oral Nightly   [2]

## 2025-04-17 NOTE — CM/SW NOTE
04/17/25 1200   Discharge disposition   Expected discharge disposition Rehab Facili   Post Acute Care Provider   ([facility] - Acute Rehab)   Discharge transportation [facility]       Received confirmation that pt is cleared for DC.  Spoke w/ liaison Aminata Gray at [facility] - confirmed they can accept today around 3PM.    RN Osvaldo, MANUELA RN Jaz, and MD updated.    Per earlier request of RN, Eliquis coupon placed on pt's chart.    Pt's niece Michelle updated via phone- she is agreeable to DC plans and time. Confirmed her  might be at [facility] prior to transport, if not, they will meet pt at [facility].    SW spoke to Dean w/ [facility] - [facility] set for ETA 3PM. PCS completed in [facility]- pt's RN to print w/ pt's AVS.    Report phone #: [phone]      PLAN: [facility],  [facility] ETA 3PM, PCS done          MS AdrinaaW, LSW [id number]

## 2025-04-17 NOTE — DISCHARGE INSTRUCTIONS
You will be on aspirin 81mg daily for for 2 weeks, then stop aspirin and start eliquis 5 mg twice daily on 4/30/25

## 2025-04-17 NOTE — CM/SW NOTE
07:25AM  Received notice via Infectious overnight - pt's insurance has approved Acute Rehab at [facility].    Per [facility], they can accept today if medically cleared.  SW uploaded requested updates in [employer] this AM.    MD and RN updated.    11:55AM  Received call from Aminata morin/ [facility] inquiring about DC today.    Per chart, cardiology saw pt but note does not specify clearance.  Message sent to ABBY Riley, MANUELA Sebastian, and Dr. Olmedo - pending response to confirm.    PLAN: [facility] Acute, [facility] on WC, PCS needs date - pending med clear        SW/CM to remain available for support and/or discharge planning.         Adriana, MSW, LSW [id number]

## 2025-04-17 NOTE — PLAN OF CARE
Problem: Patient Centered Care  Goal: Patient preferences are identified and integrated in the patient's plan of care  Description: Interventions:- What would you like us to know as we care for you? HOME ALONE- Provide timely, complete, and accurate information to patient/family- Incorporate patient and family knowledge, values, beliefs, and cultural backgrounds into the planning and delivery of care- Encourage patient/family to participate in care and decision-making at the level they choose- Honor patient and family perspectives and choices  Outcome: Completed     Problem: Diabetes/Glucose Control  Goal: Glucose maintained within prescribed range  Description: INTERVENTIONS:- Monitor Blood Glucose as ordered- Assess for signs and symptoms of hyperglycemia and hypoglycemia- Administer ordered medications to maintain glucose within target range- Assess barriers to adequate nutritional intake and initiate nutrition consult as needed- Instruct patient on self management of diabetes  Outcome: Completed     Problem: Patient/Family Goals  Goal: Patient/Family Long Term Goal  Description: Patient's Long Term Goal: to go back home Interventions:- follow medical regimen- See additional Care Plan goals for specific interventions  Outcome: Completed  Goal: Patient/Family Short Term Goal  Description: Patient's Short Term Goal: to not be weak Interventions: - neuro consult - See additional Care Plan goals for specific interventions  Outcome: Completed     Problem: NEUROLOGICAL - ADULT  Goal: Achieves stable or improved neurological status  Description: INTERVENTIONS- Assess for and report changes in neurological status- Initiate measures to prevent increased intracranial pressure- Maintain blood pressure and fluid volume within ordered parameters to optimize cerebral perfusion and minimize risk of hemorrhage- Monitor temperature, glucose, and sodium. Initiate appropriate interventions as ordered  Outcome: Completed  Goal:  Achieves maximal functionality and self care  Description: INTERVENTIONS- Monitor swallowing and airway patency with patient fatigue and changes in neurological status- Encourage and assist patient to increase activity and self care with guidance from PT/OT- Encourage visually impaired, hearing impaired and aphasic patients to use assistive/communication devices  Outcome: Completed

## 2025-04-17 NOTE — DISCHARGE SUMMARY
[facility]  Discharge Summary     Sarwat Townsend  : 1932    Status: Inpatient  Day #: 4    Attending: Radhames Olmedo MD  PCP: Kamlesh Durant MD     Date of Admission:  2025  Date of Discharge:  2025     Hospital Discharge Diagnoses:     Subacute embolic stroke  NSTEMI Type 2  Paroxysmal afib  HTN  HL  DM2  Possible pneumonia  Mild acute HFpEF      History of Present Illness:     Copied from Admission H&P:    Srawat Townsend is a 89 year old male with a history of HTN, HLD and DM2, who presents with right-sided weakness, blurry vision, slurred speech and shortness of breath for 2 days. Patient stated he developed right-sided weakness, blurry vision, slurred speech and shortness of breath about 2 days ago, but did not seek medical attention. When his family called him today, he was noticed to have speech problem. EMS was then called, and the patient was brought to hospital.     In ED, patient has normal vitals, no elevated blood pressure. Right-sided weakness and aphasia were noted. Stroke alert was called. Blood work showed troponin 3741. No acute ischemic changes on ECG. CT head showed subacute left occipital cortical infarct, small chronic right occipital cortical infarct, and old left cerebellar infarct. Aspirin 324 mg given. Neuro and Cards consulted.      Hospital Course:     Sarwat Townsend is a 89 year old male with a history of HTN, HLD and DM2, who presents with right-sided weakness, blurry vision, slurred speech and shortness of breath for 2 days.      # Subacute embolic stroke - with aphasia, R weakness  - CT head showed subacute left occipital cortical infarct, small chronic right occipital cortical infarct, and old left cerebellar infarct.   - MRI brain shows multiple   - apprec neuro consult. Hold AC for 2 weeks for now   - Aspirin 324 mg given, continue aspirin 81 mg daily and atorvastatin 40 mg   - after 2 weeks, stop aspirin and start eliquis. Rx given by cardiology APN  - PT,  OT, SLP  - TSH, A1c, LDL - 8.7 A1c, ldl 55, tsh normal   - PT/OT saw patient - reccomending acute rehab - transfer today     # NSTEMI type 2  - troponin 3741, no acute ischemic changes on ECG.   - apprec cards consult   - echo result reviewed    Paroxysmal afib  -in NSR  -cont cardiac meds as below  -asa monotherapy for 2 weeks then change to eliquis monotherapy     SOB - component of acute HFpEF, possible pneumonia  - CXR shows possible fluid overload versus pna  - cont zosyn IV -> augmentin to complete 5 day course  - received lasix IV  - off oxygen now     # Hypertension  - cont meds and monitor     # DM2 - on metformin 850 bid at home   - A1c 8.7   - diet education      # Hyponatremia  - monitor     Malnutrion - dietician consulted      Consultants         Provider   Role Specialty     Dirk Sandra MD      Consulting Physician Rehabilitation     Luis Sandra DO      Consulting Physician NEUROLOGY     Day Wilson APRN      Consulting Physician Nurse Practitioner             Physical Exam:   Blood pressure 138/66, pulse 80, temperature 98 °F (36.7 °C), temperature source Axillary, resp. rate 22, weight 112 lb (50.8 kg), SpO2 94%.  General:  Alert, no distress  HEENT:  Normocephalic, atraumatic  Cardiac:  Regular rate, regular rhythm  Pulmonary:  Clear to auscultation bilaterally, respirations unlabored  Gastrointestinal:  Soft, non-tender, normal bowel sounds  Musculoskeletal:  No joint swelling  Extremities:  No edema, no cyanosis, no clubbing  Neurologic:  nonfocal  Psychiatric:  Normal affect, calm and appropriate         Discharge Medications        START taking these medications        Instructions Prescription details   apixaban 5 MG Tabs  Commonly known as: Eliquis  Start taking on: April 30, 2025      Take 1 tablet (5 mg total) by mouth 2 (two) times daily.   Quantity: 60 tablet  Refills: 1     aspirin 81 MG Chew  Start taking on: April 18, 2025      Chew 1 tablet (81 mg total) by mouth daily  for 14 days.   Stop taking on: May 2, 2025  Quantity: 14 tablet  Refills: 0     atorvastatin 40 MG Tabs  Commonly known as: Lipitor      Take 1 tablet (40 mg total) by mouth nightly.   Quantity: 30 tablet  Refills: 1     heparin 5000 UNIT/ML Soln  Commonly known as: Porcine      Inject 1 mL (5,000 Units total) into the skin every 8 (eight) hours.   Refills: 0     insulin aspart 100 Units/mL Sopn  Commonly known as: NovoLOG      Inject 1-7 Units into the skin 3 (three) times daily with meals.   Refills: 0     melatonin 3 MG Tabs       Refills: 0     metoprolol succinate ER 25 MG Tb24  Commonly known as: Toprol XL  Start taking on: April 18, 2025      Take 1 tablet (25 mg total) by mouth Daily Beta Blocker.   Quantity: 30 tablet  Refills: 1     Polyethylene Glycol 3350 17 g Pack  Commonly known as: MIRALAX      Take 17 g by mouth daily as needed (If no bowel movement in last 24 hours).   Refills: 0            CONTINUE taking these medications        Instructions Prescription details   amLODIPine 10 MG Tabs  Commonly known as: Norvasc      Take 1 tablet (10 mg total) by mouth daily.   Quantity: 90 tablet  Refills: 3     amoxicillin clavulanate 875-125 MG Tabs  Commonly known as: Augmentin      Take 1 tablet by mouth 2 (two) times daily.   Stop taking on: April 21, 2025  Refills: 0     B Complex-C-Folic Acid Tabs      Take by mouth.   Refills: 0     clobetasol 0.05 % Crea  Commonly known as: Temovate      Use bid to red itchy areas on chest arms neck   Quantity: 45 g  Refills: 0     Eye-Vites Tabs      Take by mouth in the morning and before bedtime.   Refills: 0     ezetimibe 10 MG Tabs  Commonly known as: Zetia      Take 1 tablet (10 mg total) by mouth daily.   Quantity: 90 tablet  Refills: 3     metFORMIN 850 MG Tabs  Commonly known as: Glucophage      Take 1 tablet (850 mg total) by mouth 2 (two) times daily with meals.   Quantity: 180 tablet  Refills: 3     Vitamin D3 25 MCG (1000 UT) Caps      Take 1 tablet by  mouth in the morning.   Refills: 0            STOP taking these medications      nitrofurantoin monohydrate macro 100 MG Caps  Commonly known as: Macrobid        sodium polystyrene sulfonate 15 GM/60ML Susp  Commonly known as: Kayexalate                  Where to Get Your Medications        These medications were sent to [facility] - [place], [place] - [address] [facility] [phone], [phone]  [address] [facility] [address]      Phone: [phone]   ezetimibe 10 MG Tabs       These medications were sent to [id number] - [facility] [address] [facility] [phone], [phone]  [address] [facility] [address]      Phone: [phone]   apixaban 5 MG Tabs  aspirin 81 MG Chew  atorvastatin 40 MG Tabs  metoprolol succinate ER 25 MG Tb24        Follow-up Information       Kamlesh Durant MD. Schedule an appointment as soon as possible for a visit in 1 week(s).    Specialty: Internal Medicine  Contact information:  [address] [facility] [id number] [phone]               [facility] Follow up in 2 week(s).    Why: Office will call to schedule follow up with APN  Contact information:  [address] [facility] [address]  [facility] [address] [phone]                           Hospital Discharge Diagnoses:  subacute CVA    Lace+ Score: 78  59-90 High Risk  29-58 Medium Risk  0-28   Low Risk.    TCM Follow-Up Recommendation:  LACE > 58: High Risk of readmission after discharge from the hospital.        I spent >30 minutes on this discharge. Discussed treatment and discharge plans.       Radhames Olmedo MD

## 2025-04-17 NOTE — PLAN OF CARE
Reviewed neurology note from 4/15 regarding DOAC.     Plan:   -asa 81mg monotherapy for 2 weeks, then transition to DOAC for monotherapy  -start eliquis 5mg BID on 4/30, will send to pharmacy  -stable for discharge to rehab from cardiac standpoint if okay per neuro, we will arrange for OP follow up        Ruby Bateman, MSN, APN, FNP-BC, CCK  04/17/25  12:17 PM  [phone] [place]  [phone] [place]

## 2025-04-18 ENCOUNTER — PATIENT OUTREACH (OUTPATIENT)
Dept: CASE MANAGEMENT | Age: OVER 89
End: 2025-04-18

## 2025-04-18 NOTE — PROGRESS NOTES
DM / Neuro/Stroke; confirming Neurology appointment  (discharged 04/17) - patient discharged to [facility]     DM - patient discharged [facility]    Dr Kaylin Cordova  Neurology  [address] [place] [address]  [phone]  Confirming appointment; patient has existing appointment Thu 05/15 @10:20am    Attempt #1:  Left message with patient's emergency contactMichelle on voicemail to call transitions specialist back to schedule follow up appointments. Provided Transitions specialist scheduling phone number [phone].

## 2025-04-19 NOTE — PROGRESS NOTES
Physician Clarification    Additional information related to the Present on admission status:     Pneumonia -  (x) Yes, present on admission () Not, present on admission () clinically undetermined     Acute HFpEF (x) Yes, present on admission () Not, present on admission () clinically undetermined    This note is part of the patient's medical record.

## 2025-04-19 NOTE — PROGRESS NOTES
Provider Clarification     Additional clarification of the pneumonia being treated.    Type: Aspiration pneumonia of liquids    This note is part of the patient's medical record.

## 2025-04-21 NOTE — PROGRESS NOTES
DM / Neuro/Stroke; confirming Neurology appointment  (discharged 04/17) - patient discharged to [facility]      DM - patient discharged [facility]     Dr Kaylin Cordova  Neurology  [address] [place] [address]  [phone]  Confirming appointment; patient has existing appointment Thu 05/15 @10:20am     Attempt #2:  Left message with patient's emergency contactMichelle on voicemail to call transitions specialist back to schedule follow up appointments. Provided Transitions specialist scheduling phone number [phone].

## 2025-04-21 NOTE — PAYOR COMM NOTE
--------------  DISCHARGE REVIEW    Payor: [employer]  Subscriber #:  [id number]  Authorization Number: [id number]    Admit date: 25  Admit time:   6:06 PM  Discharge Date: 2025  3:15 PM     Admitting Physician: Mirlande Amaya DO  Attending Physician:  No att. providers found  Primary Care Physician: Kamlesh Durant MD          Discharge Summary Notes        Discharge Summary signed by Radhames Olmedo MD at 2025  1:21 PM       Author: Radhames Olmedo MD Specialty: HOSPITALIST, HOSPITALIST Author Type: Physician    Filed: 2025  1:21 PM Date of Service: 2025  1:13 PM Status: Signed    : Radhames Olmedo MD (Physician)         [facility]  Discharge Summary     Sarwat Townsend  : 1932    Status: Inpatient  Day #: 4    Attending: Radhames Olmedo MD  PCP: Kamlesh Durant MD     Date of Admission:  2025  Date of Discharge:  2025     Hospital Discharge Diagnoses:     Subacute embolic stroke  NSTEMI Type 2  Paroxysmal afib  HTN  HL  DM2  Possible pneumonia  Mild acute HFpEF      History of Present Illness:     Copied from Admission H&P:    Sarwta Townsend is a 89 year old male with a history of HTN, HLD and DM2, who presents with right-sided weakness, blurry vision, slurred speech and shortness of breath for 2 days. Patient stated he developed right-sided weakness, blurry vision, slurred speech and shortness of breath about 2 days ago, but did not seek medical attention. When his family called him today, he was noticed to have speech problem. EMS was then called, and the patient was brought to hospital.     In ED, patient has normal vitals, no elevated blood pressure. Right-sided weakness and aphasia were noted. Stroke alert was called. Blood work showed troponin 3741. No acute ischemic changes on ECG. CT head showed subacute left occipital cortical infarct, small chronic right occipital cortical infarct, and old left cerebellar infarct. Aspirin 324 mg given. Neuro and Cards  consulted.      Hospital Course:     Sarwat Townsend is a 89 year old male with a history of HTN, HLD and DM2, who presents with right-sided weakness, blurry vision, slurred speech and shortness of breath for 2 days.      # Subacute embolic stroke - with aphasia, R weakness  - CT head showed subacute left occipital cortical infarct, small chronic right occipital cortical infarct, and old left cerebellar infarct.   - MRI brain shows multiple   - apprec neuro consult. Hold AC for 2 weeks for now   - Aspirin 324 mg given, continue aspirin 81 mg daily and atorvastatin 40 mg   - after 2 weeks, stop aspirin and start eliquis. Rx given by cardiology APN  - PT, OT, SLP  - TSH, A1c, LDL - 8.7 A1c, ldl 55, tsh normal   - PT/OT saw patient - reccomending acute rehab - transfer today     # NSTEMI type 2  - troponin 3741, no acute ischemic changes on ECG.   - apprec cards consult   - echo result reviewed    Paroxysmal afib  -in NSR  -cont cardiac meds as below  -asa monotherapy for 2 weeks then change to eliquis monotherapy     SOB - component of acute HFpEF, possible pneumonia  - CXR shows possible fluid overload versus pna  - cont zosyn IV -> augmentin to complete 5 day course  - received lasix IV  - off oxygen now     # Hypertension  - cont meds and monitor     # DM2 - on metformin 850 bid at home   - A1c 8.7   - diet education      # Hyponatremia  - monitor     Malnutrion - dietician consulted      Consultants         Provider   Role Specialty     Dirk Sandra MD      Consulting Physician Rehabilitation     Luis Sandra DO      Consulting Physician NEUROLOGY     Day Wilson APRN      Consulting Physician Nurse Practitioner             Physical Exam:   Blood pressure 138/66, pulse 80, temperature 98 °F (36.7 °C), temperature source Axillary, resp. rate 22, weight 112 lb (50.8 kg), SpO2 94%.  General:  Alert, no distress  HEENT:  Normocephalic, atraumatic  Cardiac:  Regular rate, regular rhythm  Pulmonary:  Clear  to auscultation bilaterally, respirations unlabored  Gastrointestinal:  Soft, non-tender, normal bowel sounds  Musculoskeletal:  No joint swelling  Extremities:  No edema, no cyanosis, no clubbing  Neurologic:  nonfocal  Psychiatric:  Normal affect, calm and appropriate         Discharge Medications        START taking these medications        Instructions Prescription details   apixaban 5 MG Tabs  Commonly known as: Eliquis  Start taking on: April 30, 2025      Take 1 tablet (5 mg total) by mouth 2 (two) times daily.   Quantity: 60 tablet  Refills: 1     aspirin 81 MG Chew  Start taking on: April 18, 2025      Chew 1 tablet (81 mg total) by mouth daily for 14 days.   Stop taking on: May 2, 2025  Quantity: 14 tablet  Refills: 0     atorvastatin 40 MG Tabs  Commonly known as: Lipitor      Take 1 tablet (40 mg total) by mouth nightly.   Quantity: 30 tablet  Refills: 1     heparin 5000 UNIT/ML Soln  Commonly known as: Porcine      Inject 1 mL (5,000 Units total) into the skin every 8 (eight) hours.   Refills: 0     insulin aspart 100 Units/mL Sopn  Commonly known as: NovoLOG      Inject 1-7 Units into the skin 3 (three) times daily with meals.   Refills: 0     melatonin 3 MG Tabs       Refills: 0     metoprolol succinate ER 25 MG Tb24  Commonly known as: Toprol XL  Start taking on: April 18, 2025      Take 1 tablet (25 mg total) by mouth Daily Beta Blocker.   Quantity: 30 tablet  Refills: 1     Polyethylene Glycol 3350 17 g Pack  Commonly known as: MIRALAX      Take 17 g by mouth daily as needed (If no bowel movement in last 24 hours).   Refills: 0            CONTINUE taking these medications        Instructions Prescription details   amLODIPine 10 MG Tabs  Commonly known as: Norvasc      Take 1 tablet (10 mg total) by mouth daily.   Quantity: 90 tablet  Refills: 3     amoxicillin clavulanate 875-125 MG Tabs  Commonly known as: Augmentin      Take 1 tablet by mouth 2 (two) times daily.   Stop taking on: April 21,  2025  Refills: 0     B Complex-C-Folic Acid Tabs      Take by mouth.   Refills: 0     clobetasol 0.05 % Crea  Commonly known as: Temovate      Use bid to red itchy areas on chest arms neck   Quantity: 45 g  Refills: 0     Eye-Vites Tabs      Take by mouth in the morning and before bedtime.   Refills: 0     ezetimibe 10 MG Tabs  Commonly known as: Zetia      Take 1 tablet (10 mg total) by mouth daily.   Quantity: 90 tablet  Refills: 3     metFORMIN 850 MG Tabs  Commonly known as: Glucophage      Take 1 tablet (850 mg total) by mouth 2 (two) times daily with meals.   Quantity: 180 tablet  Refills: 3     Vitamin D3 25 MCG (1000 UT) Caps      Take 1 tablet by mouth in the morning.   Refills: 0            STOP taking these medications      nitrofurantoin monohydrate macro 100 MG Caps  Commonly known as: Macrobid        sodium polystyrene sulfonate 15 GM/60ML Susp  Commonly known as: Kayexalate                  Where to Get Your Medications        These medications were sent to [facility] - [address] [phone], [phone]  [facility] [facility] [address]      Phone: [phone]   ezetimibe 10 MG Tabs       These medications were sent to [id number] - [place], [place] - [address] [facility] [phone], [phone]  [address] [facility] [address]      Phone: [phone]   apixaban 5 MG Tabs  aspirin 81 MG Chew  atorvastatin 40 MG Tabs  metoprolol succinate ER 25 MG Tb24        Follow-up Information       Kamlesh Durant MD. Schedule an appointment as soon as possible for a visit in 1 week(s).    Specialty: Internal Medicine  Contact information:  [address] [facility] [address]  [phone]               [facility] Follow up in 2 week(s).    Why: Office will call to schedule follow up with APN  Contact information:  [address]  [facility] [address] [phone]                           Hospital Discharge Diagnoses:  subacute  CVA    Lace+ Score: 78  59-90 High Risk  29-58 Medium Risk  0-28   Low Risk.    TCM Follow-Up Recommendation:  LACE > 58: High Risk of readmission after discharge from the hospital.        I spent >30 minutes on this discharge. Discussed treatment and discharge plans.       Radhames Olmedo MD      Electronically signed by Radhames Olmedo MD on 4/17/2025  1:21 PM         REVIEWER COMMENTS

## 2025-04-22 NOTE — PROGRESS NOTES
DM / Neuro/Stroke; confirming Neurology appointment  (discharged 04/17) - patient discharged to [facility]      DM - patient discharged [facility]     Dr Kaylin Cordova  Neurology  [address] [place] [address]  [phone]  Confirming appointment; patient has existing appointment Thu 05/15 @10:20am  Appointment changed; Tue 06/10 @12:40pm - [place]  Confirmed w/pt's caregiver, Michelle  Closing encounter

## 2025-05-05 ENCOUNTER — TELEPHONE (OUTPATIENT)
Facility: CLINIC | Age: OVER 89
End: 2025-05-05

## 2025-05-05 NOTE — TELEPHONE ENCOUNTER
CHAO Smith, Ccare Manager with [facility] called reporting patient  at home on 5/3/2025. I made her aware I will convey the above to Dr. Durant. She verbalized understanding. No further questions or concerns at this time.    [Chart updated as , per protocol]

## 2025-05-06 ENCOUNTER — MED REC SCAN ONLY (OUTPATIENT)
Dept: INTERNAL MEDICINE CLINIC | Facility: CLINIC | Age: OVER 89
End: 2025-05-06